# Patient Record
Sex: FEMALE | Race: WHITE | Employment: OTHER | URBAN - METROPOLITAN AREA
[De-identification: names, ages, dates, MRNs, and addresses within clinical notes are randomized per-mention and may not be internally consistent; named-entity substitution may affect disease eponyms.]

---

## 2017-05-30 ENCOUNTER — OP HISTORICAL/CONVERTED ENCOUNTER (OUTPATIENT)
Dept: OTHER | Age: 72
End: 2017-05-30

## 2017-10-04 ENCOUNTER — OP HISTORICAL/CONVERTED ENCOUNTER (OUTPATIENT)
Dept: OTHER | Age: 72
End: 2017-10-04

## 2019-03-14 ENCOUNTER — OP HISTORICAL/CONVERTED ENCOUNTER (OUTPATIENT)
Dept: OTHER | Age: 74
End: 2019-03-14

## 2019-04-11 ENCOUNTER — OP HISTORICAL/CONVERTED ENCOUNTER (OUTPATIENT)
Dept: OTHER | Age: 74
End: 2019-04-11

## 2020-05-29 ENCOUNTER — OP HISTORICAL/CONVERTED ENCOUNTER (OUTPATIENT)
Dept: OTHER | Age: 75
End: 2020-05-29

## 2020-06-08 ENCOUNTER — OFFICE VISIT (OUTPATIENT)
Dept: FAMILY MEDICINE CLINIC | Age: 75
End: 2020-06-08

## 2020-06-08 ENCOUNTER — HOSPITAL ENCOUNTER (OUTPATIENT)
Dept: LAB | Age: 75
Discharge: HOME | End: 2020-06-08

## 2020-06-08 VITALS
TEMPERATURE: 98 F | DIASTOLIC BLOOD PRESSURE: 61 MMHG | RESPIRATION RATE: 18 BRPM | HEART RATE: 69 BPM | WEIGHT: 150.8 LBS | BODY MASS INDEX: 25.12 KG/M2 | HEIGHT: 65 IN | SYSTOLIC BLOOD PRESSURE: 137 MMHG | OXYGEN SATURATION: 98 %

## 2020-06-08 DIAGNOSIS — I10 ESSENTIAL HYPERTENSION: ICD-10-CM

## 2020-06-08 DIAGNOSIS — N18.2 TYPE 2 DIABETES MELLITUS WITH STAGE 2 CHRONIC KIDNEY DISEASE, WITH LONG-TERM CURRENT USE OF INSULIN (HCC): Chronic | ICD-10-CM

## 2020-06-08 DIAGNOSIS — G45.9 TIA (TRANSIENT ISCHEMIC ATTACK): ICD-10-CM

## 2020-06-08 DIAGNOSIS — E11.22 TYPE 2 DIABETES MELLITUS WITH STAGE 2 CHRONIC KIDNEY DISEASE, WITH LONG-TERM CURRENT USE OF INSULIN (HCC): Chronic | ICD-10-CM

## 2020-06-08 DIAGNOSIS — M79.2 NEUROPATHIC PAIN: ICD-10-CM

## 2020-06-08 DIAGNOSIS — N83.8 OVARIAN MASS: ICD-10-CM

## 2020-06-08 DIAGNOSIS — Z79.4 TYPE 2 DIABETES MELLITUS WITH STAGE 2 CHRONIC KIDNEY DISEASE, WITH LONG-TERM CURRENT USE OF INSULIN (HCC): Chronic | ICD-10-CM

## 2020-06-08 DIAGNOSIS — G89.29 OTHER CHRONIC PAIN: ICD-10-CM

## 2020-06-08 DIAGNOSIS — I10 ESSENTIAL HYPERTENSION: Primary | ICD-10-CM

## 2020-06-08 LAB
ALBUMIN SERPL-MCNC: 3.8 G/DL (ref 3.5–5)
ALBUMIN/GLOB SERPL: 1 {RATIO} (ref 1.1–2.2)
ALP SERPL-CCNC: 101 U/L (ref 45–117)
ALT SERPL-CCNC: 22 U/L (ref 12–78)
AMPHET UR QL SCN: NEGATIVE
ANION GAP SERPL CALC-SCNC: 8 MMOL/L (ref 5–15)
AST SERPL-CCNC: 20 U/L (ref 15–37)
BARBITURATES UR QL SCN: NEGATIVE
BENZODIAZ UR QL: NEGATIVE
BILIRUB SERPL-MCNC: 0.5 MG/DL (ref 0.2–1)
BUN SERPL-MCNC: 28 MG/DL (ref 6–20)
BUN/CREAT SERPL: 23 (ref 12–20)
CALCIUM SERPL-MCNC: 9.7 MG/DL (ref 8.5–10.1)
CANNABINOIDS UR QL SCN: NEGATIVE
CHLORIDE SERPL-SCNC: 108 MMOL/L (ref 97–108)
CHOLEST SERPL-MCNC: 197 MG/DL
CO2 SERPL-SCNC: 24 MMOL/L (ref 21–32)
COCAINE UR QL SCN: NEGATIVE
CREAT SERPL-MCNC: 1.21 MG/DL (ref 0.55–1.02)
CREAT UR-MCNC: 157 MG/DL
DRUG SCRN COMMENT,DRGCM: NORMAL
ERYTHROCYTE [DISTWIDTH] IN BLOOD BY AUTOMATED COUNT: 16.2 % (ref 11.5–14.5)
EST. AVERAGE GLUCOSE BLD GHB EST-MCNC: 177 MG/DL
GLOBULIN SER CALC-MCNC: 3.8 G/DL (ref 2–4)
GLUCOSE SERPL-MCNC: 52 MG/DL (ref 65–100)
HBA1C MFR BLD: 7.8 % (ref 4–5.6)
HCT VFR BLD AUTO: 34.1 % (ref 35–47)
HDLC SERPL-MCNC: 53 MG/DL
HDLC SERPL: 3.7 {RATIO} (ref 0–5)
HGB BLD-MCNC: 10.1 G/DL (ref 11.5–16)
LDLC SERPL CALC-MCNC: 108 MG/DL (ref 0–100)
LIPID PROFILE,FLP: ABNORMAL
MCH RBC QN AUTO: 24.2 PG (ref 26–34)
MCHC RBC AUTO-ENTMCNC: 29.6 G/DL (ref 30–36.5)
MCV RBC AUTO: 81.8 FL (ref 80–99)
METHADONE UR QL: NEGATIVE
MICROALBUMIN UR-MCNC: 4.25 MG/DL
MICROALBUMIN/CREAT UR-RTO: 27 MG/G (ref 0–30)
NRBC # BLD: 0 K/UL (ref 0–0.01)
NRBC BLD-RTO: 0 PER 100 WBC
OPIATES UR QL: NEGATIVE
PCP UR QL: NEGATIVE
PLATELET # BLD AUTO: 379 K/UL (ref 150–400)
PMV BLD AUTO: 10.2 FL (ref 8.9–12.9)
POTASSIUM SERPL-SCNC: 5.1 MMOL/L (ref 3.5–5.1)
PROT SERPL-MCNC: 7.6 G/DL (ref 6.4–8.2)
RBC # BLD AUTO: 4.17 M/UL (ref 3.8–5.2)
SODIUM SERPL-SCNC: 140 MMOL/L (ref 136–145)
TRIGL SERPL-MCNC: 180 MG/DL (ref ?–150)
VLDLC SERPL CALC-MCNC: 36 MG/DL
WBC # BLD AUTO: 9.3 K/UL (ref 3.6–11)

## 2020-06-08 RX ORDER — INSULIN ASPART 100 [IU]/ML
INJECTION, SOLUTION INTRAVENOUS; SUBCUTANEOUS AS NEEDED
COMMUNITY

## 2020-06-08 RX ORDER — DICLOFENAC SODIUM 75 MG/1
75 TABLET, DELAYED RELEASE ORAL DAILY
COMMUNITY

## 2020-06-08 RX ORDER — GABAPENTIN 300 MG/1
300 CAPSULE ORAL
Qty: 90 CAP | Refills: 0 | Status: SHIPPED | OUTPATIENT
Start: 2020-06-08 | End: 2020-07-24 | Stop reason: SDUPTHER

## 2020-06-08 RX ORDER — SIMVASTATIN 20 MG/1
20 TABLET, FILM COATED ORAL AT BEDTIME
COMMUNITY
Start: 2017-03-29 | End: 2020-06-24 | Stop reason: SDUPTHER

## 2020-06-08 RX ORDER — GABAPENTIN 300 MG/1
300 CAPSULE ORAL 3 TIMES DAILY
COMMUNITY
Start: 2017-11-17 | End: 2020-06-08 | Stop reason: SDUPTHER

## 2020-06-08 RX ORDER — TRAMADOL HYDROCHLORIDE 50 MG/1
50 TABLET ORAL
COMMUNITY
Start: 2020-05-07 | End: 2020-07-24 | Stop reason: SDUPTHER

## 2020-06-08 RX ORDER — AMLODIPINE BESYLATE 5 MG/1
5 TABLET ORAL DAILY
COMMUNITY
Start: 2020-05-23 | End: 2020-08-20 | Stop reason: SDUPTHER

## 2020-06-08 RX ORDER — RAMIPRIL 10 MG/1
10 CAPSULE ORAL 2 TIMES DAILY
COMMUNITY
Start: 2017-03-29 | End: 2020-06-24 | Stop reason: SDUPTHER

## 2020-06-08 RX ORDER — CLOPIDOGREL BISULFATE 75 MG/1
75 TABLET ORAL DAILY
COMMUNITY
Start: 2017-03-29 | End: 2020-06-24 | Stop reason: SDUPTHER

## 2020-06-08 RX ORDER — LORATADINE 10 MG/1
10 TABLET ORAL 2 TIMES DAILY
COMMUNITY
End: 2021-10-13

## 2020-06-08 RX ORDER — FAMOTIDINE 40 MG/1
40 TABLET, FILM COATED ORAL DAILY
COMMUNITY
Start: 2020-05-26

## 2020-06-08 RX ORDER — METFORMIN HYDROCHLORIDE 1000 MG/1
1000 TABLET ORAL 2 TIMES DAILY
COMMUNITY
Start: 2017-03-29 | End: 2020-06-24 | Stop reason: SDUPTHER

## 2020-06-08 NOTE — PATIENT INSTRUCTIONS
Caleb Belle with [facility]  [address] [place] [address], [facility] [address]  [phone]    Log blood pressures at home while sitting, relaxed 3-5 times weekly and bring to next visit. Goal BP of 130/80 on average or lower. Call office as soon as possible if BP's over 140/90 or below 110/50 on multiple occasions and/or with symptoms of dizziness, chest pain, shortness of breath, headache or ankle swelling. Recheck Log and BP in office in [unfilled]    Blood Pressure Record     Patient Name:  ______________________ :  ______________________    Date/Time BP Reading Pulse                                                                                                                                                                                          [facility]   [address]  [phone]    Blood Sugar Record  Patient Name:__________________________                     :__________________    Monitor blood sugar at home then bring the record to office  in 3 weeks for review.       Date Breakfast Lunch Dinner Bedtime notes

## 2020-06-08 NOTE — PROGRESS NOTES
[facility]    History of Present Illness:   Clau Elizabeth is a 75 y.o. female with history of Diabetes Mellitus, Hypertension, Chronic Pain, TIA  CC: Establish Care  History provided by patient and Records    HPI:  Ovarian Mass: Patient has a large mass (8in by 5in) that is being removed on Friday. Hypertension Follow up:  Currently Taking Ramipril 10 mg, Amlodipine 5 mg. On reviewing previous records BP consistently elevated in the -190, and diagnosed with white coat hypertension. The patient reports:  taking medications as instructed, no medication side effects noted, patient does not perform home BP monitoring, no TIA's, no chest pain on exertion, no dyspnea on exertion, no swelling of ankles. BP Readings from Last 3 Encounters:   06/08/20 137/61      Diabetes Follow up: Overall the patient feels well with good energy level. Per records from previous PCP last A1C was 7.7. Patient has had decreased Novolog use, but is on 20 units BID of Levemir   Current Medications:   Key Antihyperglycemic Medications             metFORMIN (GLUCOPHAGE) 1,000 mg tablet (Taking) Take 1,000 mg by mouth two (2) times a day. insulin detemir U-100 (LEVEMIR) 100 unit/mL injection (Taking) 25 Units by SubCUTAneous route two (2) times a day. insulin aspart U-100 (NovoLOG U-100 Insulin aspart) 100 unit/mL injection (Taking) by SubCUTAneous route as needed. .   Insulin dependence: YES, Novolog sliding scale, Levemir 20 units BID. Metformin 1000 mg BID. Frequency of home glucose testing: Daily   Blood Sugar range at home: 150-190    Last eye exam: In past 12 months. Last foot exam: This year.    Polyuria, polyphagia or polydipsia: NO   Retinopathy: NO   Neuropathy SX: YES- Gabapentin 300 mg taken nightly   Low blood sugar symptoms: NO   Dietary compliance: lots of fruit, and high carb vegetables   Medication compliance: compliant most of the time   On ASA: NO   Tobacco Use: NO   Depression: NO     Wt Readings from Last 3 Encounters:   06/08/20 150 lb 12.8 oz (68.4 kg)     Hypertriglyceridemia Follow up:   Cardiovascular risks for her are: diabetic, hypertension, hyperlipidemia, CVA. Currently she takes Zocor (simvastatin) , 20 mg. Myalgias: NO   Fatigue: NO   Other side effects: NO     Wt Readings from Last 3 Encounters:   06/08/20 150 lb 12.8 oz (68.4 kg)     History of CVA/TIA: Patient with history of TIA 5-6 years ago, since returned to normal function. Currently taking Plavix. Arthritis: Osteoarthritis primarily in the hands and low back that is controlled with Tramadol. Patient Has seen Rheumatologist at [facility] that had negative studies. Health Maintenance Due   Topic Date Due    Foot Exam Q1  08/19/1955    A1C test (Diabetic or Prediabetic)  [birth year]19/1955    MICROALBUMIN Q1  08/19/1955    Eye Exam Retinal or Dilated  08/19/1955    Lipid Screen  08/19/1955    DTaP/Tdap/Td series (1 - Tdap) 08/19/1965    Shingrix Vaccine Age 50> (1 of 2) 08/19/1994    GLAUCOMA SCREENING Q2Y  08/19/2009    Bone Densitometry (Dexa) Screening  08/19/2009    Pneumococcal 65+ years (1 of 1 - PPSV23) 08/19/2009    Medicare Yearly Exam  05/29/2020     GERD: Started on Famotidine, Kidney doctor stopped due to concern for effects on possible kidneys. Noting Famotidine is not as effective as Prilosec. Diet/Exercise History:  Diet: Favorite food Fruits. - Does patient eat at least 5 servings of Fruits/vegetables daily? Yes   - Is patient currently dieting? No    Exercise: Patient does not have structured exercise  - Does patient get 150 minutes of structured exercise weekly? No    Current Medications:     Current Outpatient Medications   Medication Sig    traMADoL (ULTRAM) 50 mg tablet Take 50 mg by mouth every four to six (4-6) hours as needed.  ramipriL (ALTACE) 10 mg capsule Take 10 mg by mouth two (2) times a day.     metFORMIN (GLUCOPHAGE) 1,000 mg tablet Take 1,000 mg by mouth two (2) times a day.  amLODIPine (NORVASC) 5 mg tablet Take 5 mg by mouth daily.  simvastatin (ZOCOR) 20 mg tablet Take 20 mg by mouth At bedtime.  clopidogreL (PLAVIX) 75 mg tab Take 75 mg by mouth daily.  famotidine (PEPCID) 40 mg tablet Take 40 mg by mouth daily.  diclofenac EC (VOLTAREN) 75 mg EC tablet Take 75 mg by mouth daily.  loratadine (Claritin) 10 mg tablet Take 10 mg by mouth two (2) times a day.  insulin detemir U-100 (LEVEMIR) 100 unit/mL injection 25 Units by SubCUTAneous route two (2) times a day.  insulin aspart U-100 (NovoLOG U-100 Insulin aspart) 100 unit/mL injection by SubCUTAneous route as needed.  gabapentin (NEURONTIN) 300 mg capsule Take 1 Cap by mouth nightly. Max Daily Amount: 300 mg. No current facility-administered medications for this visit. Past Medical, Family, and Social History:     Past Medical History:   Diagnosis Date    Chronic pain     Diabetes ([facility] [address]     Hypertension      Past Surgical History:   Procedure Laterality Date    HX CHOLECYSTECTOMY      HX GYN      HX ORTHOPAEDIC      HX UROLOGICAL       No family history on file.   Social History     Socioeconomic History    Marital status:      Spouse name: Not on file    Number of children: Not on file    Years of education: Not on file    Highest education level: Not on file   Occupational History    Not on file   Social Needs    Financial resource strain: Not on file    Food insecurity     Worry: Not on file     Inability: Not on file    Transportation needs     Medical: Not on file     Non-medical: Not on file   Tobacco Use    Smoking status: Never Smoker    Smokeless tobacco: Never Used   Substance and Sexual Activity    Alcohol use: Never     Frequency: Never    Drug use: Never    Sexual activity: Not on file   Lifestyle    Physical activity     Days per week: Not on file     Minutes per session: Not on file    Stress: Not on file Relationships    Social connections     Talks on phone: Not on file     Gets together: Not on file     Attends Restorationism service: Not on file     Active member of club or organization: Not on file     Attends meetings of clubs or organizations: Not on file     Relationship status: Not on file    Intimate partner violence     Fear of current or ex partner: Not on file     Emotionally abused: Not on file     Physically abused: Not on file     Forced sexual activity: Not on file   Other Topics Concern    Not on file   Social History Narrative    [employer] school and course of Psychology     Allergies   Allergen Reactions    Codeine Other (comments)    Sulfa (Sulfonamide Antibiotics) Other (comments)       There is no immunization history on file for this patient. Review of Systems   Review of Systems   Constitutional: Negative for chills and fever. HENT: Negative for congestion. Eyes: Negative for blurred vision. Respiratory: Negative for cough. Cardiovascular: Negative for chest pain and palpitations. Gastrointestinal: Negative for abdominal pain, nausea and vomiting. Musculoskeletal: Positive for back pain and joint pain. Skin: Negative for rash. Objective:     Visit Vitals  /61 (BP 1 Location: Right arm, BP Patient Position: Sitting)   Pulse 69   Temp 98 °F (36.7 °C) (Oral)   Resp 18   Ht 5' 5\" (1.651 m)   Wt 150 lb 12.8 oz (68.4 kg)   SpO2 98%   BMI 25.09 kg/m²        Physical Exam  Vitals signs and nursing note reviewed. Constitutional:       Appearance: Normal appearance. HENT:      Head: Normocephalic and atraumatic. Eyes:      Extraocular Movements: Extraocular movements intact. Conjunctiva/sclera: Conjunctivae normal.   Neck:      Musculoskeletal: Normal range of motion and neck supple. Cardiovascular:      Rate and Rhythm: Normal rate and regular rhythm. Pulses: Normal pulses. Heart sounds: Normal heart sounds. No murmur. No friction rub.  No gallop. Pulmonary:      Effort: Pulmonary effort is normal.      Breath sounds: Normal breath sounds. Abdominal:      General: Abdomen is flat. Bowel sounds are normal.      Palpations: Abdomen is soft. Musculoskeletal: Normal range of motion. General: No swelling. Skin:     General: Skin is warm and dry. Neurological:      General: No focal deficit present. Mental Status: She is alert. Cranial Nerves: Cranial nerve deficit present. Psychiatric:         Mood and Affect: Mood normal.         Behavior: Behavior normal.         Thought Content: Thought content normal.         Judgment: Judgment normal.         Pertinent Labs/Studies:      Assessment and orders:       ICD-10-CM ICD-9-CM    1. Essential hypertension I10 401.9 CBC W/O DIFF      METABOLIC PANEL, COMPREHENSIVE   2. Other chronic pain G89.29 338.29 DRUG SCREEN, URINE      COMPLIANCE DRUG SCREEN/PRESCRIPTION MONITORING   3. Type 2 diabetes mellitus with stage 2 chronic kidney disease, with long-term current use of insulin ([facility]) E11.22 250.40 LIPID PANEL    N18.2 585.2 HEMOGLOBIN A1C WITH EAG    Z79.4 V58.67 MICROALBUMIN, UR, RAND W/ MICROALB/CREAT RATIO   4. Ovarian mass N83.8 620.8    5. TIA (transient ischemic attack) G45.9 435.9    6. Neuropathic pain M79.2 729.2 gabapentin (NEURONTIN) 300 mg capsule     Diagnoses and all orders for this visit:    1. Essential hypertension: BP improved on repeat. White coat Hypertension. No changes to current regimen.    -     CBC W/O DIFF; Future  -     METABOLIC PANEL, COMPREHENSIVE; Future    2. Other chronic pain: Setting up Compliance contract, reviewed records, continuing current medications.  -     DRUG SCREEN, URINE; Future  -     COMPLIANCE DRUG SCREEN/PRESCRIPTION MONITORING; Future    3.  Type 2 diabetes mellitus with stage 2 chronic kidney disease, with long-term current use of insulin [facility]): Discussed with patient today that the goal for their diabetes is to have a HgA1C<7 and ideally as close to 6.5 as possible. We discussed diet and medications. The goal for the cholesterol LDL is less than 70 and HDL>40. Patient is aware of the need for yearly eye exams and to take care of their feet daily. Discussed with patient that blood pressure should be less than 130/80 and watching salt intake is very important.    -     LIPID PANEL; Future  -     HEMOGLOBIN A1C WITH EAG; Future  -     MICROALBUMIN, UR, RAND W/ MICROALB/CREAT RATIO; Future    4. Ovarian mass: Follow up with Oncologist and surgery scheduled for Friday. Requesting records. 5. TIA (transient ischemic attack): Old, no deficits noted. 6. Neuropathic pain: Controlled substance agreement signed. Able to review records. Also advising trial of gabapentin in the morning to help with Shingles related itching.    -     gabapentin (NEURONTIN) 300 mg capsule; Take 1 Cap by mouth nightly. Max Daily Amount: 300 mg. Follow-up and Dispositions    · Return in about 3 months (around 9/8/2020). I have discussed the diagnosis with the patient and the intended plan as seen in the above orders. Social history, medical history, and labs were reviewed. The patient has received an after-visit summary and questions were answered concerning future plans. I have discussed medication side effects and warnings with the patient as well.     MD HOUSTON Gilliam & ALONDRA WOODS [facility]  06/08/20

## 2020-06-08 NOTE — LETTER
Name:Simone Chawla Solders PPA:[birth year] MR #:<[id number]> 2102 [facility] Page 1 of 5 CONTROLLED SUBSTANCE AGREEMENT I may be prescribed medications that are controlled substances as part  of my treatment plan for management of my medical condition(s). The goal of my treatment plan is to maintain and/or improve my health and wellbeing. Because controlled substances have an increased risk of abuse or harm, continual re-evaluation is needed determine if the goals of my treatment plan are being met for my safety and the safety of others. I, Rosalino Barth  am entering into this Controlled Substance Agreement with my provider, __________________________________ at [facility] . I understand that successful treatment requires mutual trust and honesty between me and my provider. I understand that there are state and federal laws and regulations which apply to the medications that my provider may prescribe that must be followed. I understand there are risks and benefits ts of taking the medicines that my provider may prescribe. I understand and agree that following this Agreement is necessary in continuing my provider-patient relationship and success of my treatment plan. As a part of my treatment plan, I agree to the following: COMMUNICATION: 
 
1. I will communicate fully with my provider about my medical condition(s), including the effect on my daily life and how well my medications are helping. I will tell my provider all of the medications that I take for any reason, including medications I receive from another health care provider, and will notify my provider about all issues, problems or concerns, including any side effects, which may be related to my medications. I understand that this information allows my provider to adjust my treatment plan to help manage my medical condition.  I understand that this information will become part of my permanent medical record. 2. I will notify my provider if I have a history of alcohol/drug misuse/addiction or if I have had treatment for alcohol/drug addiction in the past, or if I have a new problem with or concern about alcohol/drug use/addiction, because this increases the likelihood of high risk behaviors and may lead to serious medical conditions. 3. Females Only: I will notify my provider if I am or become pregnant, or if I intend to become pregnant, or if I intend to breastfeed. I understand that communication of these issues with my provider is important, due to possible effects my medication could have on an unborn fetus or breastfeeding child. Initials_____ Name:Simone Smith AKL:[id number] MR #:<[id number]> 2102 [facility] Page 2 of 5 MISUSE OF MEDICATIONS / DRUGS: 
 
1. I agree to take all controlled substances as prescribed, and will not misuse or abuse any controlled substances prescribed by my provider. For my safety, I will not increase the amount of medicine I take without first talking with and getting permission from my provider. 2. If I have a medical emergency, another health care provider may prescribe me medication. If I seek emergency treatment, I will notify my provider within seventy-two (72) hours. 3. I understand that my provider may discuss my use and/or possible misuse/abuse of controlled substances and alcohol, as appropriate, with any health care provider involved in my care, pharmacist or legal authority. ILLEGAL DRUGS: 
 
1. I will not use illegal drugs of any kind, including but not limited to marijuana, heroin, cocaine, or any prescription drug which is not prescribed to me. DRUG DIVERSION / PRESCRIPTION FRAUD: 
 
1. I will not share, sell, trade, give away, or otherwise misuse my prescriptions or medications. 2. I will not alter any prescriptions provided to me by my provider. SINGLE PROVIDER: 
 
1. I agree that all controlled substances that I take will be prescribed only by my provider (or his/her covering provider) under this Agreement. This agreement does not prevent me from seeking emergency medical treatment or receiving pain management related to a surgery. PROTECTING MEDICATIONS: 
 
1. I am responsible for keeping my prescriptions and medications in a safe and secure place including safeguarding them from loss or theft. I understand that lost, stolen or damaged/destroyed prescriptions or medications will not be replaced. Initials____ Name:.Shagufta Yepez RHW:[birth year] MR #:<[id number]> 2102 [facility] Page 3 of 5 PRESCRIPTION RENEWALS/REFILLS: 
 
1. I will follow my controlled substance medication schedule as prescribed by my provider. 2. I understand and agree that I will make any requests for renewals or refills of my prescriptions only at the time of an office visit or during my providers regular office hours subject to the prescription refill requirements of the individual practice. 3. I understand that my provider may not call in prescriptions for controlled substances to my pharmacy. 4. I understand that my provider may adjust or discontinue these medications as deemed appropriate for my medical treatment plan. This Agreement does not guarantee the prescription of controlled medications. 5. I agree that if my medications are adjusted or discontinued, I will properly dispose of any remaining medications. I understand that I will be required to dispose of any remaining controlled medications prior to being provided with any prescriptions for other controlled medications.  
 
6. I understand that the renewal of my prescription depends on my medical condition, my consistent participation, and my adherence with my treatment plan and this Agreement. 7. I understand that if I do not keep an appointment with my provider, I may not receive a renewal or refill for my controlled substance medication. PRESCRIPTION MONITORING / DRUG TESTIN. I understand that my provider may require me to provide urine, saliva or blood for testing at any time. I understand that this testing will be used to monitor for safety and adherence with my treatment plan and this Agreement. 2. I understand that my provider may ask me to provide an observed urine specimen, which means that a nurse or other health care provider may watch me provide urine, and I agree to cooperate if I am asked to provide an observed specimen. 3. I understand that if I do not provide urine, saliva or blood samples within two (2) hours of my providers request, or other timeframe decided by my provider, my treatment plan could be changed, or my prescriptions and medications may be changed or ended. 4. I understand that urine, saliva and blood test results will be a part of my permanent medical record. Initials_____ Name:Simone Qiu Susan [facility]: MR #:<[id number]> 2102 [facility] Page 4 of 5 
 
 
1. I authorize my provider and my pharmacy to cooperate fully with any local, state, or federal law enforcement agency in the investigation of any possible misuse, sale, or other diversion of my controlled substance prescriptions or medications. RISKS: 
 
1. I understand that my level of consciousness may be affected from the use of controlled substances, and I understand that there are risks, benefits, effects and potential alternatives (including no treatment) to the medications that my provider has prescribed. 2. I understand that I may become drowsy, tired, dizzy, constipated, and sick to my stomach, or have changes in my mood or in my sleep while taking my medications. I have talked with my provider about these possible side effects, risks, benefits, and alternative treatments, and my provider has answered all of my questions. 3. I understand that I should not suddenly stop taking my medications without first speaking with my provider. I understand that if I suddenly stop taking my medications, I may experience nausea, vomiting, sweating,anxiety, sleeplessness, itching or other uncomfortable feelings. 4. I will not take my medications with alcohol of any kind, including beer, wine or liquor. I understand that drinking alcohol with my medications increases the chances of side effects, including breathing problems or even death. 5. I understand that if I have a history of alcoholism or other drug addiction I may be at increased risk of addiction to my medications. Signs of addiction might include craving, compulsive use, and continued use despite harm. Since addiction is a disease, I understand my provider may decide to change my medications and refer me to appropriate treatment services. I understand that this information would become part of my permanent medical record. Initials_____ Name:Simone Yan [facility]:7/68/2242 MR #:<[id number]>  [facility] Page 5 of 5  
 
 
6. Females only: Children born to women who regularly take controlled substances are likely to have physical problems and suffer withdrawal symptoms at birth. If I am of child-bearing age, I understand that I should use safe and effective birth control while taking any controlled substances to avoid the impact of medications on an unborn fetus or  child. I agree to notify my provider immediately if I should become pregnant so that my treatment plan can be adjusted. 7. Males only: I understand that chronic use of controlled substances has been associated with low testosterone levels in males which may affect my mood, stamina, sexual desire, and general health. I understand that my provider may order the appropriate laboratory test to determine my testosterone level,and I agree to this testing. ADHERENCE: 
 
1. I understand that if I do not adhere to this Agreement in any way, my provider may change my prescriptions, stop prescribing controlled substances or end our provider-patient relationship. 2. If my provider decides to stop prescribing medication, or decides to end our provider-patient relationship,my provider may require that I taper my medications slowly. If necessary, my provider may also provide a prescription for other medications to treat my withdrawal symptoms. UNDERSTANDING THIS AGREEMENT: 
 
I understand that my provider may adjust or stop my prescriptions for controlled substances based on my medical condition and my treatment plan. I understand that this Agreement does not guarantee that I will be prescribed medications or controlled substances. I understand that controlled substances may be just one part 
of my treatment plan.  
 
My initial on each page and my signature below shows that I have read each page of this Agreement, I have had an opportunity to ask questions, and all of my questions have been answered to my satisfaction by my provider. By signing below, I agree to comply with this Agreement, and I understand that if I do not follow the Agreements listed above, my provider may stop 
 
_________________________________________  Date/Time 6/8/2020 1:46 PM   
             (Patient Signature) 
 
________________________________________    Date/Time 6/8/2020 1:46 PM 
 (Parent or Guardian Signature if <18 yrs) 
 
_________________________________________ Date/Time 6/8/2020 1:46 PM 
 (Provider Signature)

## 2020-06-08 NOTE — PROGRESS NOTES
Chief Complaint   Patient presents with   [facility] Establish Care     Visit Vitals  /68 (BP 1 Location: Right arm, BP Patient Position: Sitting)   Pulse 69   Temp 98 °F (36.7 °C) (Oral)   Resp 18   Ht 5' 5\" (1.651 m)   Wt 150 lb 12.8 oz (68.4 kg)   SpO2 98%   BMI 25.09 kg/m²     1. Have you been to the ER, urgent care clinic since your last visit? Hospitalized since your last visit? No    2. Have you seen or consulted any other health care providers outside of the [address] [place] [address] since your last visit? Include any pap smears or colon screening.  No    Reviewed record in preparation for visit and have necessary documentation  Pt did not bring medication to office visit for review  opportunity was given for questions  Goals that were addressed and/or need to be completed during or after this appointment include   Health Maintenance Due   Topic Date Due    Lipid Screen  08/19/1955    DTaP/Tdap/Td series (1 - Tdap) 08/19/1965    Shingrix Vaccine Age 50> (1 of 2) 08/19/1994    GLAUCOMA SCREENING Q2Y  08/19/2009    Bone Densitometry (Dexa) Screening  08/19/2009    Pneumococcal 65+ years (1 of 1 - PPSV23) 08/19/2009    Medicare Yearly Exam  05/29/2020

## 2020-06-09 ENCOUNTER — TELEPHONE (OUTPATIENT)
Dept: FAMILY MEDICINE CLINIC | Age: 75
End: 2020-06-09

## 2020-06-09 NOTE — PROGRESS NOTES
Normal Microalbumin. UDS negative. Stage 3 CKD, not significantly changed from previous labs. Hypoglycemia present. Mildly elevated Triglycerides and LDL. A1C elevated at 7.8, though for age, not significantly above goal, last A1C was 7. 2. Mild anemia present, otherwise unremarkable CBC.

## 2020-06-09 NOTE — TELEPHONE ENCOUNTER
Called, discussed labs. Increasing Levemir in the morning to 25 uniys from 20 units, keeping evening dose at 20 units. Faxed result letter to [facility] for Pre-op.     MD HOUSTON Lugo & ALONDRA WOODS [facility]  06/09/20

## 2020-06-13 LAB — DRUGS UR: NORMAL

## 2020-06-24 RX ORDER — PANTOPRAZOLE SODIUM 40 MG/1
40 TABLET, DELAYED RELEASE ORAL DAILY
Qty: 90 TAB | Refills: 0 | Status: SHIPPED | OUTPATIENT
Start: 2020-06-24 | End: 2020-10-06 | Stop reason: SDUPTHER

## 2020-06-24 RX ORDER — PANTOPRAZOLE SODIUM 40 MG/1
TABLET, DELAYED RELEASE ORAL
COMMUNITY
Start: 2017-03-29 | End: 2020-06-24 | Stop reason: SDUPTHER

## 2020-06-24 RX ORDER — SIMVASTATIN 20 MG/1
20 TABLET, FILM COATED ORAL
Qty: 90 TAB | Refills: 0 | Status: SHIPPED | OUTPATIENT
Start: 2020-06-24 | End: 2020-10-06 | Stop reason: SDUPTHER

## 2020-06-24 RX ORDER — METFORMIN HYDROCHLORIDE 1000 MG/1
1000 TABLET ORAL 2 TIMES DAILY
Qty: 180 TAB | Refills: 0 | Status: SHIPPED | OUTPATIENT
Start: 2020-06-24 | End: 2020-10-06 | Stop reason: SDUPTHER

## 2020-06-24 RX ORDER — RAMIPRIL 10 MG/1
10 CAPSULE ORAL 2 TIMES DAILY
Qty: 180 CAP | Refills: 0 | Status: SHIPPED | OUTPATIENT
Start: 2020-06-24 | End: 2020-10-06 | Stop reason: SDUPTHER

## 2020-06-24 RX ORDER — CLOPIDOGREL BISULFATE 75 MG/1
75 TABLET ORAL DAILY
Qty: 90 TAB | Refills: 0 | Status: SHIPPED | OUTPATIENT
Start: 2020-06-24 | End: 2020-10-06 | Stop reason: SDUPTHER

## 2020-06-24 NOTE — TELEPHONE ENCOUNTER
I am not able to select any of the scripts for refills. I need the following scripts refilled with a 3 month supply please:  Metformin 1000 mg - 2/day  Simvastatin 20 mg  Ramiprel 10 mg - 2/day  Clopidagrel 75 mg  Pantoprazole 40 mg     Mychart message from patient.

## 2020-07-22 ENCOUNTER — TELEPHONE (OUTPATIENT)
Dept: FAMILY MEDICINE CLINIC | Age: 75
End: 2020-07-22

## 2020-07-22 NOTE — TELEPHONE ENCOUNTER
Magdalene/daughter has an appointment with Dr. Severo Glover, July 24, 2020 08:00 AM. Want to know is it possible to see Pt on the same day for the back of her calf. It is very painful for her to even walk. Going on three weeks. She has been trying to just bear it. You have a 9:30 a.m.?

## 2020-07-24 ENCOUNTER — HOSPITAL ENCOUNTER (OUTPATIENT)
Dept: LAB | Age: 75
Discharge: HOME | End: 2020-07-24

## 2020-07-24 ENCOUNTER — OFFICE VISIT (OUTPATIENT)
Dept: FAMILY MEDICINE CLINIC | Age: 75
End: 2020-07-24

## 2020-07-24 VITALS
DIASTOLIC BLOOD PRESSURE: 73 MMHG | HEIGHT: 65 IN | WEIGHT: 155.4 LBS | BODY MASS INDEX: 25.89 KG/M2 | OXYGEN SATURATION: 97 % | RESPIRATION RATE: 18 BRPM | SYSTOLIC BLOOD PRESSURE: 171 MMHG | TEMPERATURE: 97.3 F | HEART RATE: 71 BPM

## 2020-07-24 DIAGNOSIS — I73.9 PERIPHERAL VASCULAR DISEASE (HCC): ICD-10-CM

## 2020-07-24 DIAGNOSIS — I10 ESSENTIAL HYPERTENSION: ICD-10-CM

## 2020-07-24 DIAGNOSIS — M79.2 NEUROPATHIC PAIN: ICD-10-CM

## 2020-07-24 DIAGNOSIS — E11.40 TYPE 2 DIABETES MELLITUS WITH DIABETIC NEUROPATHY, WITH LONG-TERM CURRENT USE OF INSULIN (HCC): ICD-10-CM

## 2020-07-24 DIAGNOSIS — N83.8 OVARIAN MASS: Chronic | ICD-10-CM

## 2020-07-24 DIAGNOSIS — R25.2 LEG CRAMPING: Primary | ICD-10-CM

## 2020-07-24 DIAGNOSIS — R25.2 LEG CRAMPING: ICD-10-CM

## 2020-07-24 DIAGNOSIS — Z79.4 TYPE 2 DIABETES MELLITUS WITH DIABETIC NEUROPATHY, WITH LONG-TERM CURRENT USE OF INSULIN (HCC): ICD-10-CM

## 2020-07-24 LAB
25(OH)D3 SERPL-MCNC: 9.5 NG/ML (ref 30–100)
ALBUMIN SERPL-MCNC: 3.6 G/DL (ref 3.5–5)
ALBUMIN/GLOB SERPL: 0.9 {RATIO} (ref 1.1–2.2)
ALP SERPL-CCNC: 120 U/L (ref 45–117)
ALT SERPL-CCNC: 26 U/L (ref 12–78)
ANION GAP SERPL CALC-SCNC: 6 MMOL/L (ref 5–15)
AST SERPL-CCNC: 17 U/L (ref 15–37)
BILIRUB SERPL-MCNC: 0.2 MG/DL (ref 0.2–1)
BUN SERPL-MCNC: 18 MG/DL (ref 6–20)
BUN/CREAT SERPL: 16 (ref 12–20)
CALCIUM SERPL-MCNC: 9.2 MG/DL (ref 8.5–10.1)
CHLORIDE SERPL-SCNC: 106 MMOL/L (ref 97–108)
CO2 SERPL-SCNC: 26 MMOL/L (ref 21–32)
CREAT SERPL-MCNC: 1.1 MG/DL (ref 0.55–1.02)
D DIMER PPP FEU-MCNC: 3.27 MG/L FEU (ref 0–0.65)
FOLATE SERPL-MCNC: 6.9 NG/ML (ref 5–21)
GLOBULIN SER CALC-MCNC: 3.8 G/DL (ref 2–4)
GLUCOSE SERPL-MCNC: 165 MG/DL (ref 65–100)
HCT VFR BLD AUTO: 34 % (ref 35–47)
HGB BLD-MCNC: 9.9 G/DL (ref 11.5–16)
POTASSIUM SERPL-SCNC: 5.3 MMOL/L (ref 3.5–5.1)
PROT SERPL-MCNC: 7.4 G/DL (ref 6.4–8.2)
SODIUM SERPL-SCNC: 138 MMOL/L (ref 136–145)
VIT B12 SERPL-MCNC: 214 PG/ML (ref 193–986)

## 2020-07-24 RX ORDER — TRAMADOL HYDROCHLORIDE 50 MG/1
50 TABLET ORAL
Qty: 270 TAB | Refills: 0 | Status: SHIPPED | OUTPATIENT
Start: 2020-07-24 | End: 2020-10-06 | Stop reason: SDUPTHER

## 2020-07-24 RX ORDER — GABAPENTIN 300 MG/1
300 CAPSULE ORAL
Qty: 180 CAP | Refills: 0 | Status: SHIPPED | OUTPATIENT
Start: 2020-07-24 | End: 2020-08-04 | Stop reason: SDUPTHER

## 2020-07-24 NOTE — PROGRESS NOTES
[facility]    History of Present Illness:   Antony Pierce is a 75 y.o. female with history of Diabetes Mellitus, Hypertension, Chronic Pain, TIA  CC: Follow up Blood pressure  History provided by patient and Records    HPI:    Calf: Bilateral calf pain/tenderness particularly with walking or standing for short periods. Started 3 weeks ago, patient hada surgery 4 weeks ago. Hypertension Follow up:  Currently Taking Ramipril 10 mg, Amlodipine 5 mg. The patient reports:  taking medications as instructed, no medication side effects noted, home BP monitoring in range of 976-682'L systolic over 36-97'M diastolic, no TIA's, no chest pain on exertion, no dyspnea on exertion, no swelling of ankles. BP Readings from Last 3 Encounters:   07/24/20 171/73   06/08/20 137/61      Diabetes: Blood sugars well controlled at this time. Health Maintenance  Health Maintenance Due   Topic Date Due    Foot Exam Q1  08[birth year]19/1954    Eye Exam Retinal or Dilated  08[birth year]19/1954    DTaP/Tdap/Td series (1 - Tdap) 08/19/1965    Shingrix Vaccine Age 50> (1 of 2) 08/19/1994    GLAUCOMA SCREENING Q2Y  08/19/2009    Bone Densitometry (Dexa) Screening  08/19/2009    Pneumococcal 65+ years (1 of 1 - PPSV23) 08/19/2009    Medicare Yearly Exam  06/16/2020       Past Medical, Family, and Social History:     Current Outpatient Medications on File Prior to Visit   Medication Sig Dispense Refill    metFORMIN (GLUCOPHAGE) 1,000 mg tablet Take 1 Tab by mouth two (2) times a day. 180 Tab 0    ramipriL (ALTACE) 10 mg capsule Take 1 Cap by mouth two (2) times a day. 180 Cap 0    simvastatin (ZOCOR) 20 mg tablet Take 1 Tab by mouth nightly. 90 Tab 0    clopidogreL (PLAVIX) 75 mg tab Take 1 Tab by mouth daily. 90 Tab 0    pantoprazole (PROTONIX) 40 mg tablet Take 1 Tab by mouth daily. 90 Tab 0    insulin detemir U-100 (LEVEMIR) 100 unit/mL injection 25 Units by SubCUTAneous route two (2) times a day.  45 mL 1    amLODIPine (NORVASC) 5 mg tablet Take 5 mg by mouth daily.  famotidine (PEPCID) 40 mg tablet Take 40 mg by mouth daily.  diclofenac EC (VOLTAREN) 75 mg EC tablet Take 75 mg by mouth daily.  loratadine (Claritin) 10 mg tablet Take 10 mg by mouth two (2) times a day.  insulin aspart U-100 (NovoLOG U-100 Insulin aspart) 100 unit/mL injection by SubCUTAneous route as needed.  [DISCONTINUED] traMADoL (ULTRAM) 50 mg tablet Take 50 mg by mouth every four to six (4-6) hours as needed.  [DISCONTINUED] gabapentin (NEURONTIN) 300 mg capsule Take 1 Cap by mouth nightly. Max Daily Amount: 300 mg. 90 Cap 0     No current facility-administered medications on file prior to visit.         Patient Active Problem List   Diagnosis Code    Chronic pain G89.29    Type 2 diabetes mellitus with stage 2 chronic kidney disease, with long-term current use of insulin ([facility]) E11.22, N18.2, Z79.4    Essential hypertension I10    TIA (transient ischemic attack) G45.9    Type 2 diabetes mellitus with diabetic neuropathy ([facility]) E11.40       Social History     Socioeconomic History    Marital status:      Spouse name: Not on file    Number of children: Not on file    Years of education: Not on file    Highest education level: Not on file   Occupational History    Not on file   Social Needs    Financial resource strain: Not on file    Food insecurity     Worry: Not on file     Inability: Not on file    Transportation needs     Medical: Not on file     Non-medical: Not on file   Tobacco Use    Smoking status: Never Smoker    Smokeless tobacco: Never Used   Substance and Sexual Activity    Alcohol use: Never     Frequency: Never    Drug use: Never    Sexual activity: Not on file   Lifestyle    Physical activity     Days per week: Not on file     Minutes per session: Not on file    Stress: Not on file   Relationships    Social connections     Talks on phone: Not on file     Gets together: Not on file     Attends Sabianism service: Not on file     Active member of club or organization: Not on file     Attends meetings of clubs or organizations: Not on file     Relationship status: Not on file    Intimate partner violence     Fear of current or ex partner: Not on file     Emotionally abused: Not on file     Physically abused: Not on file     Forced sexual activity: Not on file   Other Topics Concern    Not on file   Social History Narrative    [employer] school and course of Psychology       Review of Systems   Review of Systems   Constitutional: Negative for chills and fever. HENT: Negative for congestion. Respiratory: Negative for cough. Cardiovascular: Negative for chest pain. Gastrointestinal: Negative for nausea and vomiting. Musculoskeletal: Positive for myalgias. Neurological: Negative for headaches. Objective:     Visit Vitals  /73 (BP 1 Location: Right arm, BP Patient Position: Sitting)   Pulse 71   Temp 97.3 °F (36.3 °C) (Oral)   Resp 18   Ht 5' 5\" (1.651 m)   Wt 155 lb 6.4 oz (70.5 kg)   SpO2 97%   BMI 25.86 kg/m²        Physical Exam  Vitals signs and nursing note reviewed. Constitutional:       Appearance: Normal appearance. Neck:      Musculoskeletal: Normal range of motion and neck supple. Cardiovascular:      Rate and Rhythm: Normal rate and regular rhythm. Pulses: Normal pulses. Heart sounds: Normal heart sounds. Pulmonary:      Effort: Pulmonary effort is normal.      Breath sounds: Normal breath sounds. Abdominal:      General: Abdomen is flat. Bowel sounds are normal.      Palpations: Abdomen is soft. Musculoskeletal:      Comments: Right Calf Pain/Tenderness Present in the calf, negative Hohmann's sign   Neurological:      Mental Status: She is alert.        Diabetic foot exam:     Left Foot:   Visual Exam: normal    Pulse DP: 2+ (normal)   Filament test: reduced sensation    Vibratory sensation: diminished      Right Foot:   Visual Exam: normal    Pulse DP: 2+ (normal)   Filament test: reduced sensation    Vibratory sensation: diminished    Pertinent Labs/Studies:      Assessment and orders:       ICD-10-CM ICD-9-CM    1. Leg cramping  S69.6 075.93 METABOLIC PANEL, COMPREHENSIVE      HGB & HCT      VITAMIN B12      FOLATE      VITAMIN D, 25 HYDROXY      D DIMER   2. Type 2 diabetes mellitus with diabetic neuropathy, with long-term current use of insulin ([facility])  E11.40 250.60  DIABETES FOOT EXAM    Z79.4 357.2      V58.67    3. Peripheral vascular disease ([facility])  I73.9 443.9    4. Ovarian mass  N83.8 620.8    5. Neuropathic pain  M79.2 729.2 gabapentin (NEURONTIN) 300 mg capsule      traMADoL (ULTRAM) 50 mg tablet   6. Essential hypertension  I10 401.9      Diagnoses and all orders for this visit:    1. Leg cramping: Need to rule out DVT, checking medications  -     METABOLIC PANEL, COMPREHENSIVE; Future  -     HGB & HCT; Future  -     VITAMIN B12; Future  -     FOLATE; Future  -     VITAMIN D, 25 HYDROXY; Future  -     D DIMER; Future    2. Type 2 diabetes mellitus with diabetic neuropathy, with long-term current use of insulin ([facility])  -      DIABETES FOOT EXAM    3. Ovarian mass: S/p surgery, Benign    4. Neuropathic pain: Refill   -     gabapentin (NEURONTIN) 300 mg capsule; Take 1 Cap by mouth nightly. Max Daily Amount: 300 mg.  -     traMADoL (ULTRAM) 50 mg tablet; Take 1 Tab by mouth every eight (8) hours as needed for Pain for up to 90 days. Max Daily Amount: 150 mg.    5. Essential hypertension: Our goal is to normalize the blood pressure to decrease the risks of strokes and heart attacks. The patient is in agreement with the plan. Follow-up and Dispositions    · Return in about 1 week (around 7/31/2020) for Follow up leg pain. I have discussed the diagnosis with the patient and the intended plan as seen in the above orders. Social history, medical history, and labs were reviewed.   The patient has received an after-visit summary and questions were answered concerning future plans. I have discussed medication side effects and warnings with the patient as well.     MD HOUSTON Alvarez & ALONDRA WOODS [facility]  07/24/20

## 2020-07-24 NOTE — PROGRESS NOTES
Chief Complaint   Patient presents with    Leg Pain     both, calf     Visit Vitals  /66 (BP 1 Location: Left arm, BP Patient Position: Sitting)   Pulse 71   Temp 97.3 °F (36.3 °C) (Oral)   Resp 18   Ht 5' 5\" (1.651 m)   Wt 155 lb 6.4 oz (70.5 kg)   SpO2 97%   BMI 25.86 kg/m²     1. Have you been to the ER, urgent care clinic since your last visit? Hospitalized since your last visit? No    2. Have you seen or consulted any other health care providers outside of the [address] [place] [address] since your last visit? Include any pap smears or colon screening.  No    Reviewed record in preparation for visit and have necessary documentation  Pt did not bring medication to office visit for review  opportunity was given for questions  Goals that were addressed and/or need to be completed during or after this appointment include   Health Maintenance Due   Topic Date Due    Foot Exam Q1  08/19/1954    Eye Exam Retinal or Dilated  08/19/1954    DTaP/Tdap/Td series (1 - Tdap) 08/19/1965    Shingrix Vaccine Age 50> (1 of 2) 08/19/1994    GLAUCOMA SCREENING Q2Y  08/19/2009    Bone Densitometry (Dexa) Screening  08/19/2009    Pneumococcal 65+ years (1 of 1 - PPSV23) 08/19/2009    Medicare Yearly Exam  06/16/2020

## 2020-07-27 ENCOUNTER — TELEPHONE (OUTPATIENT)
Dept: FAMILY MEDICINE CLINIC | Age: 75
End: 2020-07-27

## 2020-07-27 DIAGNOSIS — M79.661 BILATERAL CALF PAIN: Primary | ICD-10-CM

## 2020-07-27 DIAGNOSIS — M79.662 BILATERAL CALF PAIN: Primary | ICD-10-CM

## 2020-07-27 RX ORDER — LANOLIN ALCOHOL/MO/W.PET/CERES
500 CREAM (GRAM) TOPICAL DAILY
Qty: 90 TAB | Refills: 1 | Status: SHIPPED | OUTPATIENT
Start: 2020-07-27 | End: 2020-10-06 | Stop reason: SDUPTHER

## 2020-07-27 RX ORDER — ACETAMINOPHEN 500 MG
2000 TABLET ORAL DAILY
Qty: 90 CAP | Refills: 1 | Status: SHIPPED | OUTPATIENT
Start: 2020-07-27 | End: 2020-10-06 | Stop reason: SDUPTHER

## 2020-07-27 NOTE — TELEPHONE ENCOUNTER
Called to discuss labs. Patient has been vomiting and having diarrhea for the last several days, Daughter is concerned about her getting dehydrated. Patient has been more fatigued as well. Patient still tolerating food and fluids now.     MD HOUSTON Alvarez & ALONDRA WOODS [facility]  07/27/20

## 2020-07-28 ENCOUNTER — HOSPITAL ENCOUNTER (OUTPATIENT)
Dept: ULTRASOUND IMAGING | Age: 75
Discharge: HOME | End: 2020-07-28
Attending: FAMILY MEDICINE
Payer: MEDICARE

## 2020-07-28 DIAGNOSIS — M79.661 BILATERAL CALF PAIN: ICD-10-CM

## 2020-07-28 DIAGNOSIS — M79.662 BILATERAL CALF PAIN: ICD-10-CM

## 2020-07-28 PROCEDURE — 93970 EXTREMITY STUDY: CPT

## 2020-07-29 ENCOUNTER — TELEPHONE (OUTPATIENT)
Dept: FAMILY MEDICINE CLINIC | Age: 75
End: 2020-07-29

## 2020-07-29 NOTE — TELEPHONE ENCOUNTER
Called and discussed negative Duplex results, no sign of DVT. Paitent per daughter is doing much better at this time.     MD HOUSTON Rivera & ALONDRA WOODS [facility]  07/29/20

## 2020-07-31 ENCOUNTER — TELEPHONE (OUTPATIENT)
Dept: FAMILY MEDICINE CLINIC | Age: 75
End: 2020-07-31

## 2020-07-31 DIAGNOSIS — R19.7 DIARRHEA, UNSPECIFIED TYPE: Primary | ICD-10-CM

## 2020-07-31 RX ORDER — ALUMINUM ZIRCONIUM OCTACHLOROHYDREX GLY 16 G/100G
1 GEL TOPICAL DAILY
Qty: 30 CAP | Refills: 1 | Status: SHIPPED | OUTPATIENT
Start: 2020-07-31 | End: 2021-08-05 | Stop reason: SDUPTHER

## 2020-07-31 NOTE — TELEPHONE ENCOUNTER
Spoke with daughter, daughter states patient was doing better but starting yesterday she started to decline, diarrhea is bad, she is not eating, only wants to sleep and weak, she is pushing fluids,  Results from Covid test are not back yet,  Daughter wanted to double check to see if she needs to go to ER or what can she do?

## 2020-08-03 ENCOUNTER — PATIENT MESSAGE (OUTPATIENT)
Dept: FAMILY MEDICINE CLINIC | Age: 75
End: 2020-08-03

## 2020-08-03 DIAGNOSIS — M79.2 NEUROPATHIC PAIN: ICD-10-CM

## 2020-08-04 RX ORDER — GABAPENTIN 300 MG/1
300 CAPSULE ORAL
Qty: 90 CAP | Refills: 0 | Status: SHIPPED | OUTPATIENT
Start: 2020-08-04 | End: 2020-08-11 | Stop reason: SDUPTHER

## 2020-08-04 NOTE — TELEPHONE ENCOUNTER
From: Coleen Dia  To: Chris Milan MD  Sent: 8/3/2020 5:40 PM EDT  Subject: Prescription Question    I talked to mom's prescription mail order group this afternoon because we have not received her gabapentin script yet. They said that the script was filled out for 180 days instead of 90 days. LOL. .. See we got you all confused!! Can you please send a new script for her for Qty 180 for 90 day supply? Thank you so much! !    Also, she is finally getting better. Today is the first day with no diarrhea and no vomiting.  WooHoo, I think the probiotic is really helping!!    Thanks again and have a great week!!

## 2020-08-10 ENCOUNTER — PATIENT MESSAGE (OUTPATIENT)
Dept: FAMILY MEDICINE CLINIC | Age: 75
End: 2020-08-10

## 2020-08-10 DIAGNOSIS — M79.2 NEUROPATHIC PAIN: ICD-10-CM

## 2020-08-11 ENCOUNTER — TELEPHONE (OUTPATIENT)
Dept: FAMILY MEDICINE CLINIC | Age: 75
End: 2020-08-11

## 2020-08-11 DIAGNOSIS — Z13.5 DIABETIC RETINOPATHY SCREENING: ICD-10-CM

## 2020-08-11 DIAGNOSIS — Z13.5 GLAUCOMA SCREENING: Primary | ICD-10-CM

## 2020-08-11 RX ORDER — GABAPENTIN 300 MG/1
600 CAPSULE ORAL
Qty: 180 CAP | Refills: 0 | Status: SHIPPED | OUTPATIENT
Start: 2020-08-11 | End: 2020-10-06 | Stop reason: SDUPTHER

## 2020-08-11 NOTE — TELEPHONE ENCOUNTER
Referral obtained from [facility] and faxed to [address] [place] [address] number [id number]  Exp date 08/11/2020

## 2020-08-11 NOTE — TELEPHONE ENCOUNTER
From: Sanjiv Valencia  To: Wander Teran MD  Sent: 8/10/2020 10:48 AM EDT  Subject: Visit Follow-Up Question    Good morning! Mom is doing better but still having a few issues. I would like to see if we can get her in again for you to see her again please. Also need to double check the Gabapentin prescription because they just tried to fill it for a 90 day supply quantity 90 and it should be 90 day supply quantity 180.

## 2020-08-11 NOTE — TELEPHONE ENCOUNTER
Daughter is calling and states the Pt is at the [facility] in [place] - fax  - need a referral dated today with authorization number. Phone number is . The insurance stated that no referral was need but facility scheduled her with an Opthalmology so she does need a referral. Asking Nurse Nasim Delong to please call the daughter. They are there now and are going to charge her $150.00 today if she has no referral. Please call daughter on cell.

## 2020-08-20 ENCOUNTER — OFFICE VISIT (OUTPATIENT)
Dept: FAMILY MEDICINE CLINIC | Age: 75
End: 2020-08-20
Payer: MEDICARE

## 2020-08-20 VITALS
BODY MASS INDEX: 24.53 KG/M2 | RESPIRATION RATE: 18 BRPM | SYSTOLIC BLOOD PRESSURE: 165 MMHG | TEMPERATURE: 97.3 F | DIASTOLIC BLOOD PRESSURE: 71 MMHG | HEART RATE: 76 BPM | WEIGHT: 147.2 LBS | OXYGEN SATURATION: 97 % | HEIGHT: 65 IN

## 2020-08-20 DIAGNOSIS — I10 ESSENTIAL HYPERTENSION: Chronic | ICD-10-CM

## 2020-08-20 DIAGNOSIS — B02.29 POST HERPETIC NEURALGIA: Primary | ICD-10-CM

## 2020-08-20 PROCEDURE — G8536 NO DOC ELDER MAL SCRN: HCPCS | Performed by: FAMILY MEDICINE

## 2020-08-20 PROCEDURE — 1101F PT FALLS ASSESS-DOCD LE1/YR: CPT | Performed by: FAMILY MEDICINE

## 2020-08-20 PROCEDURE — 1090F PRES/ABSN URINE INCON ASSESS: CPT | Performed by: FAMILY MEDICINE

## 2020-08-20 PROCEDURE — G8400 PT W/DXA NO RESULTS DOC: HCPCS | Performed by: FAMILY MEDICINE

## 2020-08-20 PROCEDURE — G8753 SYS BP > OR = 140: HCPCS | Performed by: FAMILY MEDICINE

## 2020-08-20 PROCEDURE — G8427 DOCREV CUR MEDS BY ELIG CLIN: HCPCS | Performed by: FAMILY MEDICINE

## 2020-08-20 PROCEDURE — 99213 OFFICE O/P EST LOW 20 MIN: CPT | Performed by: FAMILY MEDICINE

## 2020-08-20 PROCEDURE — G8432 DEP SCR NOT DOC, RNG: HCPCS | Performed by: FAMILY MEDICINE

## 2020-08-20 PROCEDURE — G8754 DIAS BP LESS 90: HCPCS | Performed by: FAMILY MEDICINE

## 2020-08-20 PROCEDURE — G8420 CALC BMI NORM PARAMETERS: HCPCS | Performed by: FAMILY MEDICINE

## 2020-08-20 RX ORDER — TRIAMCINOLONE ACETONIDE 1 MG/G
OINTMENT TOPICAL 2 TIMES DAILY
Qty: 80 G | Refills: 1 | Status: SHIPPED | OUTPATIENT
Start: 2020-08-20

## 2020-08-20 RX ORDER — AMLODIPINE BESYLATE 5 MG/1
5 TABLET ORAL DAILY
Qty: 90 TAB | Refills: 1 | Status: SHIPPED | OUTPATIENT
Start: 2020-08-20 | End: 2020-10-06 | Stop reason: SDUPTHER

## 2020-08-20 NOTE — PROGRESS NOTES
[facility]    History of Present Illness:   Amanda Downing is a 67 y.o. female with history of HTN, Post herpetic neuralgia, TIA, Diabetes  CC: Dizziness  History provided by patient and Records    HPI:  Patient dizziness resolved with decreasing Gabapentin back to 1 pill daily. Noting though the itching related to old Herpes zoster is persistent now, though not unbearable. Has tried Capsacin cream while on Gabapentin. Hypertension Follow up:  Currently Taking Amlodipine 5 mg, Ramipril 10 mg. The patient reports:  taking medications as instructed, no medication side effects noted, no TIA's, no chest pain on exertion, no dyspnea on exertion, no swelling of ankles. BP Readings from Last 3 Encounters:   08/20/20 165/71   07/24/20 171/73   06/08/20 137/61        Health Maintenance  Health Maintenance Due   Topic Date Due    Eye Exam Retinal or Dilated  08/19/1954    DTaP/Tdap/Td series (1 - Tdap) 08/19/1965    Shingrix Vaccine Age 50> (1 of 2) 08/19/1994    GLAUCOMA SCREENING Q2Y  08/19/2009    Bone Densitometry (Dexa) Screening  08/19/2009    Pneumococcal 65+ years (1 of 1 - PPSV23) 08/19/2009    Medicare Yearly Exam  06/16/2020       Past Medical, Family, and Social History:     Current Outpatient Medications on File Prior to Visit   Medication Sig Dispense Refill    gabapentin (NEURONTIN) 300 mg capsule Take 2 Caps by mouth nightly. Max Daily Amount: 600 mg. (Patient taking differently: Take 300 mg by mouth nightly.) 180 Cap 0    Bifidobacterium Infantis (Align) 4 mg cap Take 1 Cap by mouth daily. 30 Cap 1    cyanocobalamin (VITAMIN B12) 500 mcg tablet Take 1 Tab by mouth daily. 90 Tab 1    cholecalciferol (VITAMIN D3) (2,000 UNITS /50 MCG) cap capsule Take 2,000 Units by mouth daily. 90 Cap 1    traMADoL (ULTRAM) 50 mg tablet Take 1 Tab by mouth every eight (8) hours as needed for Pain for up to 90 days.  Max Daily Amount: 150 mg. 270 Tab 0    metFORMIN (GLUCOPHAGE) 1,000 mg tablet Take 1 Tab by mouth two (2) times a day. 180 Tab 0    ramipriL (ALTACE) 10 mg capsule Take 1 Cap by mouth two (2) times a day. 180 Cap 0    simvastatin (ZOCOR) 20 mg tablet Take 1 Tab by mouth nightly. 90 Tab 0    clopidogreL (PLAVIX) 75 mg tab Take 1 Tab by mouth daily. 90 Tab 0    pantoprazole (PROTONIX) 40 mg tablet Take 1 Tab by mouth daily. 90 Tab 0    insulin detemir U-100 (LEVEMIR) 100 unit/mL injection 25 Units by SubCUTAneous route two (2) times a day. 45 mL 1    famotidine (PEPCID) 40 mg tablet Take 40 mg by mouth daily.  diclofenac EC (VOLTAREN) 75 mg EC tablet Take 75 mg by mouth daily.  loratadine (Claritin) 10 mg tablet Take 10 mg by mouth two (2) times a day.  insulin aspart U-100 (NovoLOG U-100 Insulin aspart) 100 unit/mL injection by SubCUTAneous route as needed.  [DISCONTINUED] amLODIPine (NORVASC) 5 mg tablet Take 5 mg by mouth daily. No current facility-administered medications on file prior to visit.         Patient Active Problem List   Diagnosis Code    Chronic pain G89.29    Type 2 diabetes mellitus with stage 2 chronic kidney disease, with long-term current use of insulin [facility] E11.22, N18.2, Z79.4    Essential hypertension I10    TIA (transient ischemic attack) G45.9    Type 2 diabetes mellitus with diabetic neuropathy ([facility]) E11.40       Social History     Socioeconomic History    Marital status:      Spouse name: Not on file    Number of children: Not on file    Years of education: Not on file    Highest education level: Not on file   Occupational History    Not on file   Social Needs    Financial resource strain: Not on file    Food insecurity     Worry: Not on file     Inability: Not on file    Transportation needs     Medical: Not on file     Non-medical: Not on file   Tobacco Use    Smoking status: Never Smoker    Smokeless tobacco: Never Used   Substance and Sexual Activity    Alcohol use: Never     Frequency: Never    Drug use: Never    Sexual activity: Not on file   Lifestyle    Physical activity     Days per week: Not on file     Minutes per session: Not on file    Stress: Not on file   Relationships    Social connections     Talks on phone: Not on file     Gets together: Not on file     Attends Judaism service: Not on file     Active member of club or organization: Not on file     Attends meetings of clubs or organizations: Not on file     Relationship status: Not on file    Intimate partner violence     Fear of current or ex partner: Not on file     Emotionally abused: Not on file     Physically abused: Not on file     Forced sexual activity: Not on file   Other Topics Concern    Not on file   Social History Narrative    [employer] school and course of Psychology       Review of Systems   Review of Systems   Constitutional: Negative for chills, fever and malaise/fatigue. Musculoskeletal: Negative for myalgias. Neurological: Negative for dizziness, speech change, focal weakness and headaches. Psychiatric/Behavioral: Negative for depression. Objective:     Visit Vitals  /71 (BP 1 Location: Right arm, BP Patient Position: Sitting)   Pulse 76   Temp 97.3 °F (36.3 °C) (Temporal)   Resp 18   Ht 5' 5\" (1.651 m)   Wt 147 lb 3.2 oz (66.8 kg)   SpO2 97%   BMI 24.50 kg/m²        Physical Exam  Vitals signs and nursing note reviewed. Constitutional:       Appearance: Normal appearance. HENT:      Head: Normocephalic and atraumatic. Neck:      Musculoskeletal: Normal range of motion and neck supple. Cardiovascular:      Rate and Rhythm: Normal rate and regular rhythm. Pulses: Normal pulses. Heart sounds: Normal heart sounds. No murmur. No friction rub. No gallop. Pulmonary:      Effort: Pulmonary effort is normal.      Breath sounds: Normal breath sounds. Abdominal:      General: Abdomen is flat. Bowel sounds are normal.      Palpations: Abdomen is soft.    Musculoskeletal:         General: No swelling. Skin:     General: Skin is warm and dry. Neurological:      General: No focal deficit present. Mental Status: She is alert and oriented to person, place, and time. Cranial Nerves: No cranial nerve deficit. Sensory: No sensory deficit. Motor: No weakness. Coordination: Coordination normal.      Gait: Gait normal.   Psychiatric:         Mood and Affect: Mood normal.         Behavior: Behavior normal.         Thought Content: Thought content normal.         Judgment: Judgment normal.         Pertinent Labs/Studies:      Assessment and orders:       ICD-10-CM ICD-9-CM    1. Post herpetic neuralgia  B02.29 053.19 triamcinolone acetonide (KENALOG) 0.1 % ointment   2. Essential hypertension  I10 401.9 amLODIPine (NORVASC) 5 mg tablet     Diagnoses and all orders for this visit:    1. Post herpetic neuralgia: Dizziness/Confusion resolved with decreased Gabapentin. Trial of Kenalog cream to see if will reduce itching sensation  -     triamcinolone acetonide (KENALOG) 0.1 % ointment; Apply  to affected area two (2) times a day. use thin layer on upper back    2. Essential hypertension: Refill on medication  -     amLODIPine (NORVASC) 5 mg tablet; Take 1 Tab by mouth daily. Follow-up and Dispositions    · Return in about 2 weeks (around 9/3/2020) for Follow up for labs in office. I have discussed the diagnosis with the patient and the intended plan as seen in the above orders. Social history, medical history, and labs were reviewed. The patient has received an after-visit summary and questions were answered concerning future plans. I have discussed medication side effects and warnings with the patient as well.     MD HOUSTON Bill & ALONDRA WOODS [facility]  08/20/20

## 2020-08-20 NOTE — PROGRESS NOTES
Chief Complaint   Patient presents with    Dizziness    Other     off balance     Visit Vitals  /71 (BP 1 Location: Right arm, BP Patient Position: Sitting)   Pulse 76   Temp 97.3 °F (36.3 °C) (Temporal)   Resp 18   Ht 5' 5\" (1.651 m)   Wt 147 lb 3.2 oz (66.8 kg)   SpO2 97%   BMI 24.50 kg/m²     1. Have you been to the ER, urgent care clinic since your last visit? Hospitalized since your last visit? No    2. Have you seen or consulted any other health care providers outside of the [address] [place] [address] since your last visit? Include any pap smears or colon screening.  No    Reviewed record in preparation for visit and have necessary documentation  Pt did not bring medication to office visit for review  opportunity was given for questions  Goals that were addressed and/or need to be completed during or after this appointment include   Health Maintenance Due   Topic Date Due    Eye Exam Retinal or Dilated  08[birth year]19/1954    DTaP/Tdap/Td series (1 - Tdap) 08/19/1965    Shingrix Vaccine Age 50> (1 of 2) 08/19/1994    GLAUCOMA SCREENING Q2Y  08/19/2009    Bone Densitometry (Dexa) Screening  08/19/2009    Pneumococcal 65+ years (1 of 1 - PPSV23) 08/19/2009    Medicare Yearly Exam  06/16/2020

## 2020-10-22 ENCOUNTER — OFFICE VISIT (OUTPATIENT)
Dept: FAMILY MEDICINE CLINIC | Age: 75
End: 2020-10-22
Payer: MEDICARE

## 2020-10-22 VITALS
DIASTOLIC BLOOD PRESSURE: 64 MMHG | WEIGHT: 150 LBS | BODY MASS INDEX: 24.96 KG/M2 | TEMPERATURE: 97.2 F | SYSTOLIC BLOOD PRESSURE: 177 MMHG | OXYGEN SATURATION: 100 % | RESPIRATION RATE: 20 BRPM | HEART RATE: 80 BPM

## 2020-10-22 DIAGNOSIS — I10 ESSENTIAL HYPERTENSION: Chronic | ICD-10-CM

## 2020-10-22 DIAGNOSIS — M25.541 ARTHRALGIA OF BOTH HANDS: ICD-10-CM

## 2020-10-22 DIAGNOSIS — E55.9 VITAMIN D DEFICIENCY: ICD-10-CM

## 2020-10-22 DIAGNOSIS — M25.542 ARTHRALGIA OF BOTH HANDS: ICD-10-CM

## 2020-10-22 DIAGNOSIS — E78.2 MIXED HYPERLIPIDEMIA: ICD-10-CM

## 2020-10-22 DIAGNOSIS — H81.10 BENIGN PAROXYSMAL POSITIONAL VERTIGO, UNSPECIFIED LATERALITY: ICD-10-CM

## 2020-10-22 DIAGNOSIS — E53.8 B12 DEFICIENCY: ICD-10-CM

## 2020-10-22 DIAGNOSIS — E11.40 TYPE 2 DIABETES MELLITUS WITH DIABETIC NEUROPATHY, WITHOUT LONG-TERM CURRENT USE OF INSULIN (HCC): Primary | Chronic | ICD-10-CM

## 2020-10-22 DIAGNOSIS — Z23 ENCOUNTER FOR IMMUNIZATION: ICD-10-CM

## 2020-10-22 LAB
ALBUMIN SERPL-MCNC: 3.6 G/DL (ref 3.5–5)
ALBUMIN/GLOB SERPL: 0.9 {RATIO} (ref 1.1–2.2)
ALP SERPL-CCNC: 141 U/L (ref 45–117)
ALT SERPL-CCNC: 15 U/L (ref 12–78)
ANION GAP SERPL CALC-SCNC: 6 MMOL/L (ref 5–15)
AST SERPL-CCNC: 16 U/L (ref 15–37)
BILIRUB SERPL-MCNC: 0.2 MG/DL (ref 0.2–1)
BUN SERPL-MCNC: 10 MG/DL (ref 6–20)
BUN/CREAT SERPL: 9 (ref 12–20)
CALCIUM SERPL-MCNC: 9.3 MG/DL (ref 8.5–10.1)
CHLORIDE SERPL-SCNC: 106 MMOL/L (ref 97–108)
CO2 SERPL-SCNC: 28 MMOL/L (ref 21–32)
CREAT SERPL-MCNC: 1.15 MG/DL (ref 0.55–1.02)
ERYTHROCYTE [DISTWIDTH] IN BLOOD BY AUTOMATED COUNT: 18.6 % (ref 11.5–14.5)
EST. AVERAGE GLUCOSE BLD GHB EST-MCNC: 169 MG/DL
GLOBULIN SER CALC-MCNC: 3.8 G/DL (ref 2–4)
GLUCOSE SERPL-MCNC: 118 MG/DL (ref 65–100)
HBA1C MFR BLD: 7.5 % (ref 4–5.6)
HCT VFR BLD AUTO: 34.5 % (ref 35–47)
HGB BLD-MCNC: 10.1 G/DL (ref 11.5–16)
MCH RBC QN AUTO: 23 PG (ref 26–34)
MCHC RBC AUTO-ENTMCNC: 29.3 G/DL (ref 30–36.5)
MCV RBC AUTO: 78.6 FL (ref 80–99)
NRBC # BLD: 0 K/UL (ref 0–0.01)
NRBC BLD-RTO: 0 PER 100 WBC
PLATELET # BLD AUTO: 359 K/UL (ref 150–400)
PMV BLD AUTO: 10.2 FL (ref 8.9–12.9)
POTASSIUM SERPL-SCNC: 5.1 MMOL/L (ref 3.5–5.1)
PROT SERPL-MCNC: 7.4 G/DL (ref 6.4–8.2)
RBC # BLD AUTO: 4.39 M/UL (ref 3.8–5.2)
SODIUM SERPL-SCNC: 140 MMOL/L (ref 136–145)
WBC # BLD AUTO: 8.1 K/UL (ref 3.6–11)

## 2020-10-22 PROCEDURE — G0008 ADMIN INFLUENZA VIRUS VAC: HCPCS | Performed by: FAMILY MEDICINE

## 2020-10-22 PROCEDURE — 1090F PRES/ABSN URINE INCON ASSESS: CPT | Performed by: FAMILY MEDICINE

## 2020-10-22 PROCEDURE — 3051F HG A1C>EQUAL 7.0%<8.0%: CPT | Performed by: FAMILY MEDICINE

## 2020-10-22 PROCEDURE — 1101F PT FALLS ASSESS-DOCD LE1/YR: CPT | Performed by: FAMILY MEDICINE

## 2020-10-22 PROCEDURE — G8536 NO DOC ELDER MAL SCRN: HCPCS | Performed by: FAMILY MEDICINE

## 2020-10-22 PROCEDURE — G8432 DEP SCR NOT DOC, RNG: HCPCS | Performed by: FAMILY MEDICINE

## 2020-10-22 PROCEDURE — 90694 VACC AIIV4 NO PRSRV 0.5ML IM: CPT | Performed by: FAMILY MEDICINE

## 2020-10-22 PROCEDURE — 99214 OFFICE O/P EST MOD 30 MIN: CPT | Performed by: FAMILY MEDICINE

## 2020-10-22 PROCEDURE — G8754 DIAS BP LESS 90: HCPCS | Performed by: FAMILY MEDICINE

## 2020-10-22 PROCEDURE — G8427 DOCREV CUR MEDS BY ELIG CLIN: HCPCS | Performed by: FAMILY MEDICINE

## 2020-10-22 PROCEDURE — G8420 CALC BMI NORM PARAMETERS: HCPCS | Performed by: FAMILY MEDICINE

## 2020-10-22 PROCEDURE — G8753 SYS BP > OR = 140: HCPCS | Performed by: FAMILY MEDICINE

## 2020-10-22 PROCEDURE — G8400 PT W/DXA NO RESULTS DOC: HCPCS | Performed by: FAMILY MEDICINE

## 2020-10-22 RX ORDER — MECLIZINE HCL 12.5 MG 12.5 MG/1
12.5 TABLET ORAL
Qty: 30 TAB | Refills: 1 | Status: SHIPPED | OUTPATIENT
Start: 2020-10-22 | End: 2020-12-26 | Stop reason: SDUPTHER

## 2020-10-22 NOTE — PROGRESS NOTES
1. Have you been to the ER, urgent care clinic since your last visit? Hospitalized since your last visit? No    2. Have you seen or consulted any other health care providers outside of the [address] [place] [address] since your last visit? Include any pap smears or colon screening. No    Reviewed record in preparation for visit and have necessary documentation  Goals that were addressed and/or need to be completed during or after this appointment include     Health Maintenance Due   Topic Date Due    Eye Exam Retinal or Dilated  08/19/1955    DTaP/Tdap/Td series (1 - Tdap) 08/19/1965    Shingrix Vaccine Age 50> (1 of 2) 08/19/1994    GLAUCOMA SCREENING Q2Y  08/19/2009    Bone Densitometry (Dexa) Screening  08/19/2009    Pneumococcal 65+ years (1 of 1 - PPSV23) 08/19/2009    Medicare Yearly Exam  06/16/2020    Flu Vaccine (1) 09/01/2020       Patient is accompanied by self I have received verbal consent from Simmie Members to discuss any/all medical information while they are present in the room.

## 2020-10-22 NOTE — PROGRESS NOTES
[facility]    History of Present Illness:   Mark Ramires is a 67 y.o. female with history of HTN, TIA, Type 2 Diabetes, Chronic Pain  CC: Follow up Chronic conditions  History provided by patient and Records    HPI:  Hypertension Follow up:  Currently Taking Amlodipine 5 mg daily, Ramipril 10 mg. The patient reports:  taking medications as instructed, no medication side effects noted, no TIA's, no chest pain on exertion, no dyspnea on exertion, no swelling of ankles. BP Readings from Last 3 Encounters:   10/22/20 (!) 177/64   08/20/20 165/71   07/24/20 171/73      Diabetes Follow up: Overall the patient feels well with good energy level. Current Medications:   Key Antihyperglycemic Medications             insulin detemir U-100 (LEVEMIR) 100 unit/mL injection (Taking) 25 Units by SubCUTAneous route two (2) times a day. metFORMIN (GLUCOPHAGE) 1,000 mg tablet (Taking) Take 1 Tab by mouth two (2) times a day. insulin aspart U-100 (NovoLOG U-100 Insulin aspart) 100 unit/mL injection (Taking) by SubCUTAneous route as needed. .   Insulin dependence: YES   Frequency of home glucose testing: Daily      Wt Readings from Last 3 Encounters:   10/22/20 150 lb (68 kg)   08/20/20 147 lb 3.2 oz (66.8 kg)   07/24/20 155 lb 6.4 oz (70.5 kg)     Hypertriglyceridemia Follow up:   Cardiovascular risks for her are: diabetic  hyperlipidemia  prior CVA/TIA or known carotid artery disease. Currently she takes Zocor (simvastatin) , 20 mg. Myalgias: NO   Fatigue: NO   Other side effects: NO     Wt Readings from Last 3 Encounters:   10/22/20 150 lb (68 kg)   08/20/20 147 lb 3.2 oz (66.8 kg)   07/24/20 155 lb 6.4 oz (70.5 kg)      Vertigo:  - Onset: several years ago. - Duration/Timing: intermittent lasting for few seconds. - Frequency: Daily  - Current Symptoms: spinning. denies Tinnitus. - Associated Symptoms: Endorses None. Denies nausea, vomiting, tinnitus, headaches and palpitations. denies loss of consciousness.  - Precipitants: rising from supine position  - Current Vertigo medications: nothing  - Medications/supplements associated with Vertigo: None    - History of Vertigo: No   - History of environmental allergies: No   - History of Migraines: No   - History of CVA/TBI: Yes  - Recent viral illness: No     Health Maintenance  Health Maintenance Due   Topic Date Due    Eye Exam Retinal or Dilated  08[birth year]19/1954    DTaP/Tdap/Td series (1 - Tdap) 08/19/1965    GLAUCOMA SCREENING Q2Y  08/19/2009    Bone Densitometry (Dexa) Screening  08/19/2009    Pneumococcal 65+ years (1 of 1 - PPSV23) 08/19/2009    Medicare Yearly Exam  06/16/2020       Past Medical, Family, and Social History:     Current Outpatient Medications on File Prior to Visit   Medication Sig Dispense Refill    insulin detemir U-100 (LEVEMIR) 100 unit/mL injection 25 Units by SubCUTAneous route two (2) times a day. 45 mL 1    traMADoL (ULTRAM) 50 mg tablet Take 1 Tab by mouth every eight (8) hours as needed for Pain for up to 90 days. Max Daily Amount: 150 mg. 270 Tab 0    cyanocobalamin (VITAMIN B12) 500 mcg tablet Take 1 Tab by mouth daily. 90 Tab 1    cholecalciferol (VITAMIN D3) (2,000 UNITS /50 MCG) cap capsule Take 2,000 Units by mouth daily. 90 Cap 1    gabapentin (NEURONTIN) 300 mg capsule Take 2 Caps by mouth nightly. Max Daily Amount: 600 mg. (Patient taking differently: Take 300 mg by mouth nightly.) 180 Cap 0    amLODIPine (NORVASC) 5 mg tablet Take 1 Tab by mouth daily. 90 Tab 1    metFORMIN (GLUCOPHAGE) 1,000 mg tablet Take 1 Tab by mouth two (2) times a day. 180 Tab 1    ramipriL (ALTACE) 10 mg capsule Take 1 Cap by mouth two (2) times a day. 180 Cap 1    simvastatin (ZOCOR) 20 mg tablet Take 1 Tab by mouth nightly. 90 Tab 1    clopidogreL (PLAVIX) 75 mg tab Take 1 Tab by mouth daily. 90 Tab 1    pantoprazole (PROTONIX) 40 mg tablet Take 1 Tab by mouth daily.  90 Tab 1    triamcinolone acetonide (KENALOG) 0.1 % ointment Apply  to affected area two (2) times a day. use thin layer on upper back 80 g 1    diclofenac EC (VOLTAREN) 75 mg EC tablet Take 75 mg by mouth daily.  loratadine (Claritin) 10 mg tablet Take 10 mg by mouth two (2) times a day.  insulin aspart U-100 (NovoLOG U-100 Insulin aspart) 100 unit/mL injection by SubCUTAneous route as needed.  Bifidobacterium Infantis (Align) 4 mg cap Take 1 Cap by mouth daily. 30 Cap 1    famotidine (PEPCID) 40 mg tablet Take 40 mg by mouth daily. No current facility-administered medications on file prior to visit.         Patient Active Problem List   Diagnosis Code    Chronic pain G89.29    Type 2 diabetes mellitus with stage 2 chronic kidney disease, with long-term current use of insulin ([facility]) E11.22, N18.2, Z79.4    Essential hypertension I10    TIA (transient ischemic attack) G45.9    Type 2 diabetes mellitus with diabetic neuropathy ([facility]) E11.40       Social History     Socioeconomic History    Marital status:      Spouse name: Not on file    Number of children: Not on file    Years of education: Not on file    Highest education level: Not on file   Occupational History    Not on file   Social Needs    Financial resource strain: Not on file    Food insecurity     Worry: Not on file     Inability: Not on file    Transportation needs     Medical: Not on file     Non-medical: Not on file   Tobacco Use    Smoking status: Never Smoker    Smokeless tobacco: Never Used   Substance and Sexual Activity    Alcohol use: Never     Frequency: Never    Drug use: Never    Sexual activity: Not on file   Lifestyle    Physical activity     Days per week: Not on file     Minutes per session: Not on file    Stress: Not on file   Relationships    Social connections     Talks on phone: Not on file     Gets together: Not on file     Attends Jew service: Not on file     Active member of club or organization: Not on file     Attends meetings of clubs or organizations: Not on file     Relationship status: Not on file    Intimate partner violence     Fear of current or ex partner: Not on file     Emotionally abused: Not on file     Physically abused: Not on file     Forced sexual activity: Not on file   Other Topics Concern    Not on file   Social History Narrative    [employer] school and course of Psychology       Review of Systems   Review of Systems   Constitutional: Negative for chills and fever. HENT: Negative for congestion. Respiratory: Negative for cough. Cardiovascular: Negative for chest pain and palpitations. Gastrointestinal: Negative for abdominal pain, nausea and vomiting. Objective:     Visit Vitals  BP (!) 177/64 (BP 1 Location: Left arm, BP Patient Position: Sitting)   Pulse 80   Temp 97.2 °F (36.2 °C) (Oral)   Resp 20   Wt 150 lb (68 kg)   SpO2 100%   BMI 24.96 kg/m²        Physical Exam  Vitals signs and nursing note reviewed. Constitutional:       Appearance: Normal appearance. HENT:      Head: Normocephalic and atraumatic. Neck:      Musculoskeletal: Normal range of motion and neck supple. Cardiovascular:      Rate and Rhythm: Normal rate and regular rhythm. Pulses: Normal pulses. Heart sounds: Normal heart sounds. Pulmonary:      Effort: Pulmonary effort is normal.      Breath sounds: Normal breath sounds. Abdominal:      General: Abdomen is flat. Bowel sounds are normal.      Palpations: Abdomen is soft. Musculoskeletal: Normal range of motion. Skin:     General: Skin is warm and dry. Neurological:      General: No focal deficit present. Mental Status: She is alert and oriented to person, place, and time. Comments: Negative Epley         Pertinent Labs/Studies:      Assessment and orders:       ICD-10-CM ICD-9-CM    1.  Type 2 diabetes mellitus with diabetic neuropathy, without long-term current use of insulin ([facility])  E11.40 250.60 HEMOGLOBIN A1C WITH EAG     357.2 HEMOGLOBIN A1C WITH EAG   2. Essential hypertension  I10 401.9 CBC W/O DIFF      METABOLIC PANEL, COMPREHENSIVE      METABOLIC PANEL, COMPREHENSIVE      CBC W/O DIFF   3. Mixed hyperlipidemia  E78.2 272.2    4. B12 deficiency  E53.8 266.2 VITAMIN B12      VITAMIN B12   5. Vitamin D deficiency  E55.9 268.9 VITAMIN D, 25 HYDROXY      VITAMIN D, 25 HYDROXY   6. Encounter for immunization  Z23 V03.89 pneumococcal 23-valent (PNEUMOVAX 23) 25 mcg/0.5 mL injection      ADMIN INFLUENZA VIRUS VAC      FLU (FLUAD QUAD INFLUENZA VACCINE,QUAD,ADJUVANTED)   7. Benign paroxysmal positional vertigo, unspecified laterality  H81.10 386.11 meclizine (ANTIVERT) 12.5 mg tablet   8. Arthralgia of both hands  M25.541 719.44 REFERRAL TO RHEUMATOLOGY    M25.542       Diagnoses and all orders for this visit:    1. Type 2 diabetes mellitus with diabetic neuropathy, without long-term current use of insulin (HCC)  -     HEMOGLOBIN A1C WITH EAG; Future    2. Essential hypertension: Elevated at this time, need to to see if checks at home and if better, if not will need to increase Amlodipine and consider HCTZ as well  -     CBC W/O DIFF; Future  -     METABOLIC PANEL, COMPREHENSIVE; Future    3. Mixed hyperlipidemia    4. B12 deficiency  -     VITAMIN B12; Future    5. Vitamin D deficiency  -     VITAMIN D, 25 HYDROXY; Future    6. Encounter for immunization  -     pneumococcal 23-valent (PNEUMOVAX 23) 25 mcg/0.5 mL injection; 0.5 mL by IntraMUSCular route once for 1 dose. -     ADMIN INFLUENZA VIRUS VAC  -     FLU (FLUAD QUAD INFLUENZA VACCINE,QUAD,ADJUVANTED)    7. Benign paroxysmal positional vertigo, unspecified laterality: Trial of Meclizine  -     meclizine (ANTIVERT) 12.5 mg tablet; Take 1 Tab by mouth three (3) times daily as needed for Dizziness. 8. Arthralgia of both hands  -     REFERRAL TO RHEUMATOLOGY      Follow-up and Dispositions    · Return in about 3 months (around 1/22/2021).            I have discussed the diagnosis with the patient and the intended plan as seen in the above orders. Social history, medical history, and labs were reviewed. The patient has received an after-visit summary and questions were answered concerning future plans. I have discussed medication side effects and warnings with the patient as well.     MD HOUSTON Coleman & ALONDRA WOODS [facility]  10/22/20

## 2020-10-23 ENCOUNTER — PATIENT MESSAGE (OUTPATIENT)
Dept: FAMILY MEDICINE CLINIC | Age: 75
End: 2020-10-23

## 2020-10-23 LAB
25(OH)D3 SERPL-MCNC: 32.6 NG/ML (ref 30–100)
VIT B12 SERPL-MCNC: >2000 PG/ML (ref 193–986)

## 2020-10-25 NOTE — PROGRESS NOTES
Vitamin D is wnl, B12 is elevated, A1C is mildly elevated, CMP Creatinine/alk phos/Glucose, CBC Microcytic Anemia.

## 2020-11-06 NOTE — TELEPHONE ENCOUNTER
From: Amira Pass  To: Antionette Mayo MD  Sent: 10/23/2020 7:55 AM EDT  Subject: Non-Urgent Medical Question    Would you by chance be able to prescribe Humira for Mom instead of her going to a Rheumatologist to see if it will work first to help with her hands?

## 2020-12-16 ENCOUNTER — OFFICE VISIT (OUTPATIENT)
Dept: RHEUMATOLOGY | Age: 75
End: 2020-12-16
Payer: MEDICARE

## 2020-12-16 VITALS
TEMPERATURE: 97.3 F | RESPIRATION RATE: 16 BRPM | HEART RATE: 77 BPM | OXYGEN SATURATION: 97 % | SYSTOLIC BLOOD PRESSURE: 195 MMHG | HEIGHT: 65 IN | WEIGHT: 151.4 LBS | DIASTOLIC BLOOD PRESSURE: 74 MMHG | BODY MASS INDEX: 25.22 KG/M2

## 2020-12-16 DIAGNOSIS — M47.816 LUMBAR FACET ARTHROPATHY: ICD-10-CM

## 2020-12-16 DIAGNOSIS — Z79.899 ONGOING USE OF POSSIBLY TOXIC MEDICATION: ICD-10-CM

## 2020-12-16 DIAGNOSIS — M15.4 EROSIVE OSTEOARTHRITIS: Primary | ICD-10-CM

## 2020-12-16 PROCEDURE — G8400 PT W/DXA NO RESULTS DOC: HCPCS | Performed by: INTERNAL MEDICINE

## 2020-12-16 PROCEDURE — 1090F PRES/ABSN URINE INCON ASSESS: CPT | Performed by: INTERNAL MEDICINE

## 2020-12-16 PROCEDURE — G8753 SYS BP > OR = 140: HCPCS | Performed by: INTERNAL MEDICINE

## 2020-12-16 PROCEDURE — G8510 SCR DEP NEG, NO PLAN REQD: HCPCS | Performed by: INTERNAL MEDICINE

## 2020-12-16 PROCEDURE — G8536 NO DOC ELDER MAL SCRN: HCPCS | Performed by: INTERNAL MEDICINE

## 2020-12-16 PROCEDURE — 1100F PTFALLS ASSESS-DOCD GE2>/YR: CPT | Performed by: INTERNAL MEDICINE

## 2020-12-16 PROCEDURE — G8754 DIAS BP LESS 90: HCPCS | Performed by: INTERNAL MEDICINE

## 2020-12-16 PROCEDURE — 99205 OFFICE O/P NEW HI 60 MIN: CPT | Performed by: INTERNAL MEDICINE

## 2020-12-16 PROCEDURE — 3288F FALL RISK ASSESSMENT DOCD: CPT | Performed by: INTERNAL MEDICINE

## 2020-12-16 PROCEDURE — G8419 CALC BMI OUT NRM PARAM NOF/U: HCPCS | Performed by: INTERNAL MEDICINE

## 2020-12-16 PROCEDURE — G8427 DOCREV CUR MEDS BY ELIG CLIN: HCPCS | Performed by: INTERNAL MEDICINE

## 2020-12-16 NOTE — PATIENT INSTRUCTIONS
1. I think your hand pain is from erosive osteoarthritis, though given your family history of psoriasis I think it's reasonable to trial Ijeoma Oliver. I'm going to give you 2 months of samples, and we'll touch base again to see how this is helping. 2. Ijeoma Oliver is one pill daily. 3. Labs from [facility]. 4. Xrays from any [facility] radiology. 5. Return in 6-8 weeks.

## 2020-12-16 NOTE — PROGRESS NOTES
REASON FOR VISIT:    is a 67 y.o. female with history of chronic low back pain who is being referred to [address] [place] [address] Rheumatology at the request of Dr. Karina Acosta, regarding a diagnosis of finger pain. HISTORY OF PRESENT ILLNESS    For many years, she has had pain and bony swelling in the fingers. No flaring character to her joint pain, denies redness or soft tissue swelling. Voltaren gel helps a bit now, but short-acting. Doesn't bother with oral NSAIDs as hasn't had any relief from ibuprofen or naproxen. Low back pain stays in the low back, can radiate down legs to the feet. She has longstanding diabetes c/b neuropathy, with numbness in feet and tips of fingers. No personal history of psoriasis, though sister has this. Saw a Rheumatolist at [facility] about 5 years ago, told she had osteoarthritis. Sister has rheumatoid arthritis, gets rituximab infusions. She has had glaucoma and diabetic retinopathy requiring laser treatments. No h/o flaring eye redness or acute visual changes. Dentures irritate gums but no spontaneous ulcers.     REVIEW OF SYSTEMS  Negatives as follows:  [place]:    Denies unexplained persistent fevers, weight change, chronic fatigue  HEAD/EYES:              Denies eye redness, blurry vision or sudden loss of vision, dry eyes, HA, temporal pain  ENT:                            Denies oral/nasal ulcers, recurrent sinus infections, dry mouth  RESPIRATORY:         No pleuritic pain, history of pleural effusions, hemoptysis, exertional dyspnea  CARDIOVASCULAR:             Denies chest pain, history of pericardial effusions  GASTRO:                    Denies heartburn, esophageal dysmotility, abdominal pain, nausea, vomiting, diarrhea, blood in the stool  HEMATOLOGIC:        No easy bruising, purpura, swollen lymph nodes  SKIN:                           Denies alopecia, ulcers, nodules, sun sensitivity, unexplained persistent rash   VASCULAR: Denies edema, cyanosis, raynaud phenomenon  NEUROLOGIC:           +paresthesias in hands and feet Denies specific muscle weakness  PSYCHIATRIC:           No sleep disturbance / snoring, depression, anxiety  MSK:                           +extended stiffness, persistent joint pain      Review of systems is as above and is otherwise negative. ALLERGIES  Codeine and Sulfa (sulfonamide antibiotics)    MEDICATIONS  Current Outpatient Medications   Medication Sig    Ferrous Fumarate 325 mg (106 mg iron) tab Take  by mouth.  meclizine (ANTIVERT) 12.5 mg tablet Take 1 Tab by mouth three (3) times daily as needed for Dizziness.  insulin detemir U-100 (LEVEMIR) 100 unit/mL injection 25 Units by SubCUTAneous route two (2) times a day.  traMADoL (ULTRAM) 50 mg tablet Take 1 Tab by mouth every eight (8) hours as needed for Pain for up to 90 days. Max Daily Amount: 150 mg.    cyanocobalamin (VITAMIN B12) 500 mcg tablet Take 1 Tab by mouth daily.  cholecalciferol (VITAMIN D3) (2,000 UNITS /50 MCG) cap capsule Take 2,000 Units by mouth daily.  gabapentin (NEURONTIN) 300 mg capsule Take 2 Caps by mouth nightly. Max Daily Amount: 600 mg. (Patient taking differently: Take 300 mg by mouth nightly.)    amLODIPine (NORVASC) 5 mg tablet Take 1 Tab by mouth daily.  metFORMIN (GLUCOPHAGE) 1,000 mg tablet Take 1 Tab by mouth two (2) times a day.  ramipriL (ALTACE) 10 mg capsule Take 1 Cap by mouth two (2) times a day.  simvastatin (ZOCOR) 20 mg tablet Take 1 Tab by mouth nightly.  clopidogreL (PLAVIX) 75 mg tab Take 1 Tab by mouth daily.  triamcinolone acetonide (KENALOG) 0.1 % ointment Apply  to affected area two (2) times a day. use thin layer on upper back    Bifidobacterium Infantis (Align) 4 mg cap Take 1 Cap by mouth daily.  famotidine (PEPCID) 40 mg tablet Take 40 mg by mouth daily.  loratadine (Claritin) 10 mg tablet Take 10 mg by mouth two (2) times a day.     insulin aspart U-100 (NovoLOG U-100 Insulin aspart) 100 unit/mL injection by SubCUTAneous route as needed.  pantoprazole (PROTONIX) 40 mg tablet Take 1 Tab by mouth daily.  diclofenac EC (VOLTAREN) 75 mg EC tablet Take 75 mg by mouth daily. No current facility-administered medications for this visit. PAST MEDICAL HISTORY  Past Medical History:   Diagnosis Date    Chronic pain     Diabetes ([address]     Hypertension     Ovarian cyst     Removed Laproscopically       FAMILY HISTORY  Sister has rheumatoid arthritis, father had erosive osteoarthritis. SOCIAL HISTORY  She  reports that she has never smoked. She has never used smokeless tobacco. She reports that she does not drink alcohol or use drugs. Social History     Social History Narrative    [employer]ology school and course of Psychology   now cleans offices part-time, previously also CNA. DATA  Visit Vitals  BP (!) 195/74 (BP 1 Location: Right arm, BP Patient Position: Sitting)   Pulse 77   Temp 97.3 °F (36.3 °C) (Oral)   Resp 16   Ht 5' 5\" (1.651 m)   Wt 151 lb 6.4 oz (68.7 kg)   SpO2 97%   BMI 25.19 kg/m²    Body mass index is 25.19 kg/m². No flowsheet data found. General:  The patient is well developed, well nourished, alert, and in no apparent distress. Eyes: Sclera are anicteric. No conjunctival injection. HEENT:  Oropharynx is clear. No oral ulcers. Adequate salivary pooling. No cervical or supraclavicular lymphadenopathy. Lungs:  Clear to auscultation bilaterally, without wheeze or stridor. Normal respiratory effort. Cor:  Regular rate and rhythm. No murmur rub or gallop. Abdomen: Soft, non-tender, without hepatomegaly or masses. Extremities: No calf tenderness or edema. Warm and well perfused. Skin:  No significant abnormalities. No psoriaform lesions, no tophi. Neuro: +SLR bilaterally, normal gait. No LE muscle wasting, normal reflexes.   Musculoskeletal:    A comprehensive musculoskeletal exam was performed for all joints of each upper and lower extremity and assessed for swelling, tenderness and range of motion. Results are documented as below:  Marked Cleo nodes globally right hand with associated tenderness. No deshawn synovitis currently. Pan-flexion deformities of right hand. Bilateral knee crepitus. No evidence of synovitis in the small joints of the hands, wrists, shoulders, elbows, hips, knees or ankles. Labs:    Imaging:      ASSESSMENT AND PLAN  Ms. Laureen Lerma is a 67 y.o. 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 She has no overt synovitis today but describes ongoing worsening of flexion deformities and worsening function. She has Stage IV CKD for which would avoid methotrexate; will trial Tara Flack (initially 11mg XL daily, though may need to renally dose to 5mg daily if any worsening in GFR). We had a deshawn discussion of the risks of immunosuppression and her guarded prognosis for improvement in pain given her already extensive damage, but she is eager to trial Peck Flack and this is reasonable as a short-term trial. Reviewed continued conservative measures with topical diclofenac for the fingers, arthritis gloves, hot wax. 1. Erosive osteoarthritis  - CHRONIC HEPATITIS PANEL; Future  - QUANTIFERON-TB GOLD PLUS; Future  - C REACTIVE PROTEIN, QT; Standing  - CBC WITH AUTOMATED DIFF; Standing  - METABOLIC PANEL, COMPREHENSIVE; Standing  - SED RATE (ESR); Standing  - XR HAND RT MIN 3 V; Future  - XR HAND LT MIN 3 V; Future  - tofacitinib 11 mg Tb24; Take 11 mg by mouth daily. Indications: rheumatoid arthritis  Dispense: 60 Tab; Refill: 0    2. Ongoing use of possibly toxic medication  - CHRONIC HEPATITIS PANEL; Future  - QUANTIFERON-TB GOLD PLUS; Future  - CBC WITH AUTOMATED DIFF; Standing  - METABOLIC PANEL, COMPREHENSIVE; Standing  - tofacitinib 11 mg Tb24; Take 11 mg by mouth daily. Indications: rheumatoid arthritis  Dispense: 60 Tab; Refill: 0    3.  Lumbar facet arthropathy  -Encouraged PT, trial of TENS, lidocaine patches, f/u with pain management for consideration of facet injections or median branch block. Patient Instructions   1. I think your hand pain is from erosive osteoarthritis, though given your family history of psoriasis I think it's reasonable to trial Lcua Montes. I'm going to give you 2 months of samples, and we'll touch base again to see how this is helping. 2. Luca Montes is one pill daily. 3. Labs from [facility]. 4. Xrays from [facility] radiology. 5. Return in 6-8 weeks. Orders Placed This Encounter    QUANTIFERON-TB GOLD PLUS    XR HAND RT MIN 3 V    XR HAND LT MIN 3 V    CHRONIC HEPATITIS PANEL    C REACTIVE PROTEIN, QT    CBC WITH AUTOMATED DIFF    METABOLIC PANEL, COMPREHENSIVE    SED RATE (ESR)    Ferrous Fumarate 325 mg (106 mg iron) tab    tofacitinib 11 mg Tb24       Medications: I am having Horris Cea start on tofacitinib. I am also having her maintain her famotidine, diclofenac EC, loratadine, insulin aspart U-100, Align, triamcinolone acetonide, insulin detemir U-100, traMADoL, cyanocobalamin, cholecalciferol, gabapentin, amLODIPine, metFORMIN, ramipriL, simvastatin, clopidogreL, pantoprazole, meclizine, and Ferrous Fumarate.     Follow up: 6-8 weeks    Ildefonso Breen MD    Adult Rheumatology   [facility]  A [facility], [address] [place] [address] Road   Phone [phone]  Fax [phone]

## 2020-12-16 NOTE — LETTER
12/16/2020 Patient: Mylene Henderson YOB: 1945 Date of Visit: 12/16/2020 Brett Crump MD 
[address] [address] [address] [employer] Dear Brett Crump MD, Thank you for referring Ms. Mylene Henderson to [facility] for evaluation. My notes for this consultation are attached. If you have questions, please do not hesitate to call me. I look forward to following your patient along with you.  
 
 
Sincerely, 
 
Charissa Lopez MD

## 2020-12-21 LAB
ALBUMIN SERPL-MCNC: 4 G/DL (ref 3.7–4.7)
ALBUMIN/GLOB SERPL: 1.5 {RATIO} (ref 1.2–2.2)
ALP SERPL-CCNC: 105 IU/L (ref 39–117)
ALT SERPL-CCNC: 13 IU/L (ref 0–32)
AST SERPL-CCNC: 19 IU/L (ref 0–40)
BASOPHILS # BLD AUTO: 0.1 X10E3/UL (ref 0–0.2)
BASOPHILS NFR BLD AUTO: 1 %
BILIRUB SERPL-MCNC: <0.2 MG/DL (ref 0–1.2)
BUN SERPL-MCNC: 11 MG/DL (ref 8–27)
BUN/CREAT SERPL: 10 (ref 12–28)
CALCIUM SERPL-MCNC: 9.2 MG/DL (ref 8.7–10.3)
CHLORIDE SERPL-SCNC: 101 MMOL/L (ref 96–106)
CO2 SERPL-SCNC: 24 MMOL/L (ref 20–29)
COMMENT, 144067: NORMAL
CREAT SERPL-MCNC: 1.1 MG/DL (ref 0.57–1)
CRP SERPL-MCNC: 3 MG/L (ref 0–10)
EOSINOPHIL # BLD AUTO: 0.1 X10E3/UL (ref 0–0.4)
EOSINOPHIL NFR BLD AUTO: 1 %
ERYTHROCYTE [DISTWIDTH] IN BLOOD BY AUTOMATED COUNT: 18.4 % (ref 11.7–15.4)
ERYTHROCYTE [SEDIMENTATION RATE] IN BLOOD BY WESTERGREN METHOD: 4 MM/HR (ref 0–40)
GAMMA INTERFERON BACKGROUND BLD IA-ACNC: 0.02 IU/ML
GLOBULIN SER CALC-MCNC: 2.7 G/DL (ref 1.5–4.5)
GLUCOSE SERPL-MCNC: 145 MG/DL (ref 65–99)
HBV CORE AB SERPL QL IA: NEGATIVE
HBV CORE IGM SERPL QL IA: NEGATIVE
HBV E AB SERPL QL IA: NEGATIVE
HBV E AG SERPL QL IA: NEGATIVE
HBV SURFACE AB SER QL: NON REACTIVE
HBV SURFACE AG SERPL QL IA: NEGATIVE
HCT VFR BLD AUTO: 35.4 % (ref 34–46.6)
HCV AB S/CO SERPL IA: <0.1 S/CO RATIO (ref 0–0.9)
HGB BLD-MCNC: 11.1 G/DL (ref 11.1–15.9)
IMM GRANULOCYTES # BLD AUTO: 0 X10E3/UL (ref 0–0.1)
IMM GRANULOCYTES NFR BLD AUTO: 0 %
LYMPHOCYTES # BLD AUTO: 2.9 X10E3/UL (ref 0.7–3.1)
LYMPHOCYTES NFR BLD AUTO: 34 %
M TB IFN-G BLD-IMP: NEGATIVE
M TB IFN-G CD4+ BCKGRND COR BLD-ACNC: 0.02 IU/ML
MCH RBC QN AUTO: 24.9 PG (ref 26.6–33)
MCHC RBC AUTO-ENTMCNC: 31.4 G/DL (ref 31.5–35.7)
MCV RBC AUTO: 79 FL (ref 79–97)
MITOGEN IGNF BLD-ACNC: >10 IU/ML
MONOCYTES # BLD AUTO: 0.6 X10E3/UL (ref 0.1–0.9)
MONOCYTES NFR BLD AUTO: 7 %
NEUTROPHILS # BLD AUTO: 4.9 X10E3/UL (ref 1.4–7)
NEUTROPHILS NFR BLD AUTO: 57 %
PLATELET # BLD AUTO: 316 X10E3/UL (ref 150–450)
POTASSIUM SERPL-SCNC: 4.4 MMOL/L (ref 3.5–5.2)
PROT SERPL-MCNC: 6.7 G/DL (ref 6–8.5)
QUANTIFERON INCUBATION, QF1T: NORMAL
QUANTIFERON TB2 AG: 0.02 IU/ML
RBC # BLD AUTO: 4.46 X10E6/UL (ref 3.77–5.28)
SERVICE CMNT-IMP: NORMAL
SODIUM SERPL-SCNC: 139 MMOL/L (ref 134–144)
WBC # BLD AUTO: 8.6 X10E3/UL (ref 3.4–10.8)

## 2020-12-26 ENCOUNTER — PATIENT MESSAGE (OUTPATIENT)
Dept: FAMILY MEDICINE CLINIC | Age: 75
End: 2020-12-26

## 2020-12-26 DIAGNOSIS — E11.22 TYPE 2 DIABETES MELLITUS WITH STAGE 2 CHRONIC KIDNEY DISEASE, WITH LONG-TERM CURRENT USE OF INSULIN (HCC): Primary | Chronic | ICD-10-CM

## 2020-12-26 DIAGNOSIS — N18.2 TYPE 2 DIABETES MELLITUS WITH STAGE 2 CHRONIC KIDNEY DISEASE, WITH LONG-TERM CURRENT USE OF INSULIN (HCC): Primary | Chronic | ICD-10-CM

## 2020-12-26 DIAGNOSIS — Z79.4 TYPE 2 DIABETES MELLITUS WITH STAGE 2 CHRONIC KIDNEY DISEASE, WITH LONG-TERM CURRENT USE OF INSULIN (HCC): Primary | Chronic | ICD-10-CM

## 2020-12-26 DIAGNOSIS — H81.10 BENIGN PAROXYSMAL POSITIONAL VERTIGO, UNSPECIFIED LATERALITY: ICD-10-CM

## 2020-12-28 DIAGNOSIS — I10 ESSENTIAL HYPERTENSION: Chronic | ICD-10-CM

## 2020-12-28 DIAGNOSIS — M79.2 NEUROPATHIC PAIN: ICD-10-CM

## 2020-12-28 DIAGNOSIS — H81.10 BENIGN PAROXYSMAL POSITIONAL VERTIGO, UNSPECIFIED LATERALITY: ICD-10-CM

## 2020-12-28 RX ORDER — MECLIZINE HCL 12.5 MG 12.5 MG/1
12.5 TABLET ORAL
Qty: 60 TAB | Refills: 1 | Status: SHIPPED | OUTPATIENT
Start: 2020-12-28 | End: 2020-12-28 | Stop reason: SDUPTHER

## 2020-12-28 RX ORDER — SIMVASTATIN 20 MG/1
20 TABLET, FILM COATED ORAL
Qty: 90 TAB | Refills: 1 | Status: SHIPPED | OUTPATIENT
Start: 2020-12-28 | End: 2021-04-15 | Stop reason: SDUPTHER

## 2020-12-28 RX ORDER — AMLODIPINE BESYLATE 5 MG/1
5 TABLET ORAL DAILY
Qty: 90 TAB | Refills: 1 | Status: SHIPPED | OUTPATIENT
Start: 2020-12-28 | End: 2021-04-15 | Stop reason: SDUPTHER

## 2020-12-28 RX ORDER — GABAPENTIN 300 MG/1
600 CAPSULE ORAL
Qty: 180 CAP | Refills: 2 | Status: SHIPPED | OUTPATIENT
Start: 2020-12-28 | End: 2021-04-15 | Stop reason: SDUPTHER

## 2020-12-28 RX ORDER — BLOOD SUGAR DIAGNOSTIC
STRIP MISCELLANEOUS
Qty: 200 STRIP | Refills: 3 | Status: SHIPPED | OUTPATIENT
Start: 2020-12-28 | End: 2021-10-13

## 2020-12-28 RX ORDER — CLOPIDOGREL BISULFATE 75 MG/1
75 TABLET ORAL DAILY
Qty: 90 TAB | Refills: 1 | Status: SHIPPED | OUTPATIENT
Start: 2020-12-28 | End: 2021-04-15 | Stop reason: SDUPTHER

## 2020-12-28 RX ORDER — METFORMIN HYDROCHLORIDE 1000 MG/1
1000 TABLET ORAL 2 TIMES DAILY
Qty: 180 TAB | Refills: 1 | Status: SHIPPED | OUTPATIENT
Start: 2020-12-28 | End: 2021-04-15 | Stop reason: SDUPTHER

## 2020-12-28 RX ORDER — PANTOPRAZOLE SODIUM 40 MG/1
40 TABLET, DELAYED RELEASE ORAL DAILY
Qty: 90 TAB | Refills: 1 | Status: SHIPPED | OUTPATIENT
Start: 2020-12-28 | End: 2021-04-15 | Stop reason: SDUPTHER

## 2020-12-28 RX ORDER — RAMIPRIL 10 MG/1
10 CAPSULE ORAL 2 TIMES DAILY
Qty: 180 CAP | Refills: 1 | Status: SHIPPED | OUTPATIENT
Start: 2020-12-28 | End: 2021-04-15 | Stop reason: SDUPTHER

## 2020-12-28 RX ORDER — TRAMADOL HYDROCHLORIDE 50 MG/1
50 TABLET ORAL
Qty: 270 TAB | Refills: 0 | Status: SHIPPED | OUTPATIENT
Start: 2020-12-28 | End: 2021-04-15 | Stop reason: SDUPTHER

## 2020-12-28 RX ORDER — MECLIZINE HCL 12.5 MG 12.5 MG/1
12.5 TABLET ORAL
Qty: 270 TAB | Refills: 1 | Status: SHIPPED | OUTPATIENT
Start: 2020-12-28 | End: 2021-01-12 | Stop reason: SDUPTHER

## 2020-12-28 NOTE — TELEPHONE ENCOUNTER
From: Moustapha Parker  To: Gabrielle Teague MD  Sent: 12/26/2020 2:10 PM EST  Subject: Janak Potter... I hope you had a Kim Brianna!!    Can you please submit a new script to the [facility] mail order for a 90 day supply for the meclizine 12.5 mg tablet for mom, they seem to be helping a lot. Also wanted to know if you could send a script for her test strips as well. She tests usually 3-4 times per day and uses the Contour Next strips.     Thank you so much!!

## 2021-01-12 ENCOUNTER — PATIENT MESSAGE (OUTPATIENT)
Dept: FAMILY MEDICINE CLINIC | Age: 76
End: 2021-01-12

## 2021-01-12 DIAGNOSIS — H81.10 BENIGN PAROXYSMAL POSITIONAL VERTIGO, UNSPECIFIED LATERALITY: ICD-10-CM

## 2021-01-12 RX ORDER — MECLIZINE HCL 12.5 MG 12.5 MG/1
12.5 TABLET ORAL
Qty: 90 TAB | Refills: 0 | Status: SHIPPED | OUTPATIENT
Start: 2021-01-12 | End: 2021-04-15 | Stop reason: SDUPTHER

## 2021-01-12 NOTE — TELEPHONE ENCOUNTER
From: García Gama  To: Mercedes Byrnes MD  Sent: 1/12/2021 1:11 PM EST  Subject: Prescription Question    Good Afternoon Dr. Romulo Hsu. .. I just got off the phone with Mom's mail order pharmacy and I have a few things for you. 1. Can you please send a 30 day script for the Meclizine to [facility] in [facility] right away. They have her 80 day script under review, they have appvd an emerg script to be called in for us to  as she has been out of it for a week now. 2. Can you please call them for the pre-auth for her test strips because the contour next is not a normal strip they just pay for without pre-auth. They asked that you call [phone].  3. Can you change her next Levimir script to a 90 day w/ 5 vials they only send 4 when she actually needs 4.5 vials.     Thank you so much for all you do!! You are VERY appreciated Sir!!

## 2021-01-29 ENCOUNTER — OFFICE VISIT (OUTPATIENT)
Dept: RHEUMATOLOGY | Age: 76
End: 2021-01-29
Payer: MEDICARE

## 2021-01-29 VITALS
DIASTOLIC BLOOD PRESSURE: 70 MMHG | WEIGHT: 154.8 LBS | OXYGEN SATURATION: 98 % | TEMPERATURE: 96.9 F | HEART RATE: 86 BPM | SYSTOLIC BLOOD PRESSURE: 161 MMHG | RESPIRATION RATE: 18 BRPM | BODY MASS INDEX: 25.79 KG/M2 | HEIGHT: 65 IN

## 2021-01-29 DIAGNOSIS — M06.00 SERONEGATIVE RHEUMATOID ARTHRITIS (HCC): ICD-10-CM

## 2021-01-29 DIAGNOSIS — Z78.0 ENCOUNTER FOR OSTEOPOROSIS SCREENING IN ASYMPTOMATIC POSTMENOPAUSAL PATIENT: Primary | ICD-10-CM

## 2021-01-29 DIAGNOSIS — M15.4 EROSIVE OSTEOARTHRITIS OF BOTH HANDS: ICD-10-CM

## 2021-01-29 DIAGNOSIS — M85.80 OSTEOPENIA WITH HIGH RISK OF FRACTURE: ICD-10-CM

## 2021-01-29 DIAGNOSIS — Z13.820 ENCOUNTER FOR OSTEOPOROSIS SCREENING IN ASYMPTOMATIC POSTMENOPAUSAL PATIENT: Primary | ICD-10-CM

## 2021-01-29 PROCEDURE — 99215 OFFICE O/P EST HI 40 MIN: CPT | Performed by: INTERNAL MEDICINE

## 2021-01-29 PROCEDURE — G8419 CALC BMI OUT NRM PARAM NOF/U: HCPCS | Performed by: INTERNAL MEDICINE

## 2021-01-29 PROCEDURE — G8427 DOCREV CUR MEDS BY ELIG CLIN: HCPCS | Performed by: INTERNAL MEDICINE

## 2021-01-29 PROCEDURE — 1090F PRES/ABSN URINE INCON ASSESS: CPT | Performed by: INTERNAL MEDICINE

## 2021-01-29 PROCEDURE — G8536 NO DOC ELDER MAL SCRN: HCPCS | Performed by: INTERNAL MEDICINE

## 2021-01-29 PROCEDURE — G8754 DIAS BP LESS 90: HCPCS | Performed by: INTERNAL MEDICINE

## 2021-01-29 PROCEDURE — 3288F FALL RISK ASSESSMENT DOCD: CPT | Performed by: INTERNAL MEDICINE

## 2021-01-29 PROCEDURE — G8400 PT W/DXA NO RESULTS DOC: HCPCS | Performed by: INTERNAL MEDICINE

## 2021-01-29 PROCEDURE — G8753 SYS BP > OR = 140: HCPCS | Performed by: INTERNAL MEDICINE

## 2021-01-29 PROCEDURE — 1100F PTFALLS ASSESS-DOCD GE2>/YR: CPT | Performed by: INTERNAL MEDICINE

## 2021-01-29 PROCEDURE — G8510 SCR DEP NEG, NO PLAN REQD: HCPCS | Performed by: INTERNAL MEDICINE

## 2021-01-29 NOTE — LETTER
1/31/2021 Patient: Scott Almanza YOB: 1945 Date of Visit: 1/29/2021 Aleena Hoyos MD 
[address] [address] [address] [employer] Dear Aleena Hoyos MD, Thank you for referring Ms. Sctot Almanza to [facility] for evaluation. My notes for this consultation are attached. If you have questions, please do not hesitate to call me. I look forward to following your patient along with you.  
 
 
Sincerely, 
 
Finesse Sampson MD

## 2021-01-29 NOTE — PROGRESS NOTES
Chief Complaint   Patient presents with    Arthritis     fingers, back     1. Have you been to the ER, urgent care clinic since your last visit? Hospitalized since your last visit? No    2. Have you seen or consulted any other health care providers outside of the [address] [place] [address] since your last visit? Include any pap smears or colon screening.  No

## 2021-01-29 NOTE — PROGRESS NOTES
REASON FOR VISIT:    is a 67 y.o. 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 HISTORY OF PRESENT ILLNESS    Last visit 6wk ago, had started tofacitinib. Tolerating well, feels substantial improvement in hand pain and stiffness. No appreciable worsening of her previously progressive digital tightening. No GI upset or abdominal pain. No interval infections. Still low back pain, stays paralumbar. Last DXA was 15 years, remembers bone density was \"awesome\". Suspects she has lost about an inch in height since. No recent falls. No extended prednisone. REVIEW OF SYSTEMS  Negatives as follows:  Lennis Jointer:    Denies unexplained persistent fevers, weight change, chronic fatigue  HEAD/EYES:              Denies eye redness, blurry vision or sudden loss of vision, dry eyes, HA, temporal pain  ENT:                            Denies oral/nasal ulcers, recurrent sinus infections, dry mouth  RESPIRATORY:         No pleuritic pain, history of pleural effusions, hemoptysis, exertional dyspnea  CARDIOVASCULAR:             Denies chest pain, history of pericardial effusions  GASTRO:                    Denies heartburn, esophageal dysmotility, abdominal pain, nausea, vomiting, diarrhea, blood in the stool  HEMATOLOGIC:        No easy bruising, purpura, swollen lymph nodes  SKIN:                           Denies alopecia, ulcers, nodules, sun sensitivity, unexplained persistent rash   VASCULAR:                Denies edema, cyanosis, raynaud phenomenon  NEUROLOGIC:           +paresthesias in hands and feet Denies specific muscle weakness  PSYCHIATRIC:           No sleep disturbance / snoring, depression, anxiety  MSK:                           +extended stiffness, persistent joint pain      Review of systems is as above and is otherwise negative.     ALLERGIES  Codeine and Sulfa (sulfonamide antibiotics)    MEDICATIONS  Current Outpatient Medications   Medication Sig    meclizine (ANTIVERT) 12.5 mg tablet Take 1 Tab by mouth three (3) times daily as needed for Dizziness.  insulin detemir U-100 (LEVEMIR) 100 unit/mL injection 25 Units by SubCUTAneous route two (2) times a day.  traMADoL (ULTRAM) 50 mg tablet Take 1 Tab by mouth every eight (8) hours as needed for Pain for up to 90 days. Max Daily Amount: 150 mg.    gabapentin (NEURONTIN) 300 mg capsule Take 2 Caps by mouth nightly. Max Daily Amount: 600 mg.    amLODIPine (NORVASC) 5 mg tablet Take 1 Tab by mouth daily.  metFORMIN (GLUCOPHAGE) 1,000 mg tablet Take 1 Tab by mouth two (2) times a day.  ramipriL (ALTACE) 10 mg capsule Take 1 Cap by mouth two (2) times a day.  simvastatin (ZOCOR) 20 mg tablet Take 1 Tab by mouth nightly.  clopidogreL (PLAVIX) 75 mg tab Take 1 Tab by mouth daily.  pantoprazole (PROTONIX) 40 mg tablet Take 1 Tab by mouth daily.  glucose blood VI test strips (Contour Next Test Strips) strip Check BG up to 4 times daily. Dx E11.9    Ferrous Fumarate 325 mg (106 mg iron) tab Take  by mouth.  tofacitinib 11 mg Tb24 Take 11 mg by mouth daily. Indications: rheumatoid arthritis    cyanocobalamin (VITAMIN B12) 500 mcg tablet Take 1 Tab by mouth daily.  cholecalciferol (VITAMIN D3) (2,000 UNITS /50 MCG) cap capsule Take 2,000 Units by mouth daily.  triamcinolone acetonide (KENALOG) 0.1 % ointment Apply  to affected area two (2) times a day. use thin layer on upper back    famotidine (PEPCID) 40 mg tablet Take 40 mg by mouth daily.  diclofenac EC (VOLTAREN) 75 mg EC tablet Take 75 mg by mouth daily.  insulin aspart U-100 (NovoLOG U-100 Insulin aspart) 100 unit/mL injection by SubCUTAneous route as needed.  Bifidobacterium Infantis (Align) 4 mg cap Take 1 Cap by mouth daily.  loratadine (Claritin) 10 mg tablet Take 10 mg by mouth two (2) times a day.      No current facility-administered medications for this visit. PAST MEDICAL HISTORY  Past Medical History:   Diagnosis Date    Chronic pain     Diabetes ([address]     Hypertension     Ovarian cyst     Removed Laproscopically       FAMILY HISTORY  Sister has rheumatoid arthritis, father had erosive osteoarthritis. SOCIAL HISTORY  She  reports that she has never smoked. She has never used smokeless tobacco. She reports that she does not drink alcohol or use drugs. Social History     Social History Narrative    Cosmetology school and course of Psychology   now cleans offices part-time, previously also CNA. DATA  Visit Vitals  BP (!) 161/70 (BP 1 Location: Left upper arm, BP Patient Position: Sitting)   Pulse 86   Temp 96.9 °F (36.1 °C) (Oral)   Resp 18   Ht 5' 5\" (1.651 m)   Wt 154 lb 12.8 oz (70.2 kg)   SpO2 98%   BMI 25.76 kg/m²    Body mass index is 25.76 kg/m². No flowsheet data found. General:  The patient is well developed, well nourished, alert, and in no apparent distress. Eyes: Sclera are anicteric. No conjunctival injection. HEENT:  Oropharynx is clear. No oral ulcers. Adequate salivary pooling. No cervical or supraclavicular lymphadenopathy. Lungs:  Clear to auscultation bilaterally, without wheeze or stridor. Normal respiratory effort. Cor:  Regular rate and rhythm. No murmur rub or gallop. Abdomen: Soft, non-tender, without hepatomegaly or masses. Extremities: No calf tenderness or edema. Warm and well perfused. Skin:  No significant abnormalities. No psoriaform lesions, no tophi. Neuro: +SLR bilaterally, normal gait. No LE muscle wasting, normal reflexes. Musculoskeletal:    A comprehensive musculoskeletal exam was performed for all joints of each upper and lower extremity and assessed for swelling, tenderness and range of motion. Results are documented as below:  Marked Cleo nodes globally right hand with associated tenderness. No deshawn synovitis currently.  Pan-flexion deformities of right hand; index PIP fused, ~15deg flexion in other PIPs. Bilateral knee crepitus. No evidence of synovitis in the small joints of the hands, wrists, shoulders, elbows, hips, knees or ankles. Labs:  20: CBC WNL, Cr 1.1, Hep B cAb neg, sAb neg, sAg neg; Hep C Ab neg; QFN gold neg  20:     Imagin17 XR Lspine (report only): Impression: Plain radiographs demonstrate 100% loss of disc height at L3-4, L4-5, and L5-S1 with resultant foraminal stenosis. ASSESSMENT AND PLAN  Ms. Chidi Miles is a 67 y.o. 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 She endorses substantial improvement in pain and function since starting [employer], arguing for an underlying component of seronegative rheumatoid arthritis. She has Stage IV CKD for which would continue to avoid methotrexate; now applying for [employer] continuation for seronegative RA through her insurance given marked improvement over the last 6 weeks. 1. Encounter for osteoporosis screening in asymptomatic postmenopausal patient  - DEXA BONE DENSITY STUDY AXIAL; Future    2. Osteopenia with high risk of fracture  - DEXA BONE DENSITY STUDY AXIAL; Future    3. Seronegative rheumatoid arthritis (HCC)  - C REACTIVE PROTEIN, QT; Future  - CBC WITH AUTOMATED DIFF; Future  - METABOLIC PANEL, COMPREHENSIVE; Future  - SED RATE (ESR); Future  - LIPID PANEL; Future  - tofacitinib 11 mg Tb24; Take 11 mg by mouth daily. Indications: rheumatoid arthritis  Dispense: 30 Tab; Refill: 0  - tofacitinib 11 mg Tb24; Take 11 mg by mouth daily. Indications: rheumatoid arthritis  Dispense: 30 Tab; Refill: 0    4. Erosive osteoarthritis of both hands  - C REACTIVE PROTEIN, QT; Future  - CBC WITH AUTOMATED DIFF; Future  - METABOLIC PANEL, COMPREHENSIVE; Future  - SED RATE (ESR); Future  - LIPID PANEL; Future       Patient Instructions   1. Xrays and bone density at your convenience.  You may need to schedule the DXA scan ahead of time. Call [phone] to schedule    2. Labs in 1 month, any [facility] (or [facility] labs can send [facility] labs)    3. Message if you have any problems with infections, rashes, new joint concerns. 4. Tylenol 650mg up to 3 times per day is safe and may help take the edge off.    5. Return in 3 months. Orders Placed This Encounter    DEXA BONE DENSITY STUDY AXIAL    C REACTIVE PROTEIN, QT    CBC WITH AUTOMATED DIFF    METABOLIC PANEL, COMPREHENSIVE    SED RATE (ESR)    LIPID PANEL    tofacitinib 11 mg Tb24    tofacitinib 11 mg Tb24       Medications: I am having Clinton Jara start on tofacitinib and tofacitinib. I am also having her maintain her famotidine, diclofenac EC, loratadine, insulin aspart U-100, Align, triamcinolone acetonide, cyanocobalamin, cholecalciferol, Ferrous Fumarate, tofacitinib, insulin detemir U-100, traMADoL, gabapentin, amLODIPine, metFORMIN, ramipriL, simvastatin, clopidogreL, pantoprazole, Contour Next Test Strips, and meclizine.     Follow up: 3 months    Mame Callaway MD    Adult Rheumatology   General acute hospital  A Part of [facility], [address]   Phone [phone]  Fax [phone]

## 2021-01-29 NOTE — PATIENT INSTRUCTIONS
1. Xrays and bone density at your convenience. You may need to schedule the DXA scan ahead of time. Call [phone] to schedule    2. Labs in 1 month, any [facility] (or [employer] labs can send [facility] labs)    3. Message if you have any problems with infections, rashes, new joint concerns. 4. Tylenol 650mg up to 3 times per day is safe and may help take the edge off.    5. Return in 3 months.

## 2021-02-02 ENCOUNTER — DOCUMENTATION ONLY (OUTPATIENT)
Dept: PHARMACY | Age: 76
End: 2021-02-02

## 2021-02-02 NOTE — PROGRESS NOTES
[facility] at [address] [facility] Update    Date: 02/02/21    Mary Pantoja 1945    Medication: Noah Dubs XR 11 mg    Prior Authorization: through 12/31/2021    Co-pay $6960.37     Patient has a high co-pay on their prescription. Pharmacy staff contacted the patient and advised them to call the [facility] at [phone] to discuss potentially obtaining the medication through the  directly or other treatment options, if appropriate. Pharmacist answered any questions that the patient had.     Ismael Gibson, [address] at [facility],  [place]   [place], [address]  phone: [phone]   fax: [phone]

## 2021-02-09 ENCOUNTER — HOSPITAL ENCOUNTER (OUTPATIENT)
Dept: MAMMOGRAPHY | Age: 76
Discharge: HOME | End: 2021-02-09
Attending: INTERNAL MEDICINE
Payer: MEDICARE

## 2021-02-09 ENCOUNTER — HOSPITAL ENCOUNTER (OUTPATIENT)
Dept: GENERAL RADIOLOGY | Age: 76
Discharge: HOME | End: 2021-02-09
Attending: INTERNAL MEDICINE
Payer: MEDICARE

## 2021-02-09 DIAGNOSIS — Z13.820 ENCOUNTER FOR OSTEOPOROSIS SCREENING IN ASYMPTOMATIC POSTMENOPAUSAL PATIENT: ICD-10-CM

## 2021-02-09 DIAGNOSIS — Z78.0 ENCOUNTER FOR OSTEOPOROSIS SCREENING IN ASYMPTOMATIC POSTMENOPAUSAL PATIENT: ICD-10-CM

## 2021-02-09 DIAGNOSIS — M15.4 EROSIVE OSTEOARTHRITIS: ICD-10-CM

## 2021-02-09 PROCEDURE — 77080 DXA BONE DENSITY AXIAL: CPT

## 2021-02-09 PROCEDURE — 73130 X-RAY EXAM OF HAND: CPT

## 2021-03-17 ENCOUNTER — DOCUMENTATION ONLY (OUTPATIENT)
Dept: RHEUMATOLOGY | Age: 76
End: 2021-03-17

## 2021-03-17 NOTE — PROGRESS NOTES
Faxed [employer] Patient Assistance paperwork for DR. Williamson, and the patient's form for Xeljanz XR to [employer].

## 2021-04-15 ENCOUNTER — OFFICE VISIT (OUTPATIENT)
Dept: FAMILY MEDICINE CLINIC | Age: 76
End: 2021-04-15
Payer: MEDICARE

## 2021-04-15 VITALS
BODY MASS INDEX: 26.66 KG/M2 | WEIGHT: 160 LBS | RESPIRATION RATE: 18 BRPM | SYSTOLIC BLOOD PRESSURE: 156 MMHG | TEMPERATURE: 97 F | OXYGEN SATURATION: 99 % | HEIGHT: 65 IN | HEART RATE: 66 BPM | DIASTOLIC BLOOD PRESSURE: 70 MMHG

## 2021-04-15 DIAGNOSIS — E78.2 MIXED HYPERLIPIDEMIA: ICD-10-CM

## 2021-04-15 DIAGNOSIS — G89.29 OTHER CHRONIC PAIN: Chronic | ICD-10-CM

## 2021-04-15 DIAGNOSIS — M79.2 NEUROPATHIC PAIN: ICD-10-CM

## 2021-04-15 DIAGNOSIS — Z86.73 HX OF TRANSIENT ISCHEMIC ATTACK (TIA): Chronic | ICD-10-CM

## 2021-04-15 DIAGNOSIS — I10 ESSENTIAL HYPERTENSION: Primary | Chronic | ICD-10-CM

## 2021-04-15 DIAGNOSIS — E11.40 TYPE 2 DIABETES MELLITUS WITH DIABETIC NEUROPATHY, WITHOUT LONG-TERM CURRENT USE OF INSULIN (HCC): ICD-10-CM

## 2021-04-15 PROBLEM — G45.9 TIA (TRANSIENT ISCHEMIC ATTACK): Chronic | Status: RESOLVED | Noted: 2020-06-08 | Resolved: 2021-04-15

## 2021-04-15 PROCEDURE — 1090F PRES/ABSN URINE INCON ASSESS: CPT | Performed by: FAMILY MEDICINE

## 2021-04-15 PROCEDURE — G8536 NO DOC ELDER MAL SCRN: HCPCS | Performed by: FAMILY MEDICINE

## 2021-04-15 PROCEDURE — G8754 DIAS BP LESS 90: HCPCS | Performed by: FAMILY MEDICINE

## 2021-04-15 PROCEDURE — 1100F PTFALLS ASSESS-DOCD GE2>/YR: CPT | Performed by: FAMILY MEDICINE

## 2021-04-15 PROCEDURE — 3288F FALL RISK ASSESSMENT DOCD: CPT | Performed by: FAMILY MEDICINE

## 2021-04-15 PROCEDURE — G8427 DOCREV CUR MEDS BY ELIG CLIN: HCPCS | Performed by: FAMILY MEDICINE

## 2021-04-15 PROCEDURE — G8399 PT W/DXA RESULTS DOCUMENT: HCPCS | Performed by: FAMILY MEDICINE

## 2021-04-15 PROCEDURE — 99214 OFFICE O/P EST MOD 30 MIN: CPT | Performed by: FAMILY MEDICINE

## 2021-04-15 PROCEDURE — G8419 CALC BMI OUT NRM PARAM NOF/U: HCPCS | Performed by: FAMILY MEDICINE

## 2021-04-15 PROCEDURE — G8510 SCR DEP NEG, NO PLAN REQD: HCPCS | Performed by: FAMILY MEDICINE

## 2021-04-15 PROCEDURE — G8753 SYS BP > OR = 140: HCPCS | Performed by: FAMILY MEDICINE

## 2021-04-15 RX ORDER — TRAMADOL HYDROCHLORIDE 50 MG/1
50 TABLET ORAL
Qty: 270 TAB | Refills: 0 | Status: SHIPPED | OUTPATIENT
Start: 2021-04-15 | End: 2021-07-14 | Stop reason: SDUPTHER

## 2021-04-15 NOTE — PROGRESS NOTES
[facility], [place]    History of Present Illness:   Megan Smith is a 67 y.o. female with history of HTN, TIA, DM, Chronic Pain  CC: Follow up Chronic COnditions  History provided by patient and Records    HPI:  Hypertension Follow up:  Currently Taking Amlodipine 5 mg daily, Ramipril 10 mg. The patient reports:  taking medications as instructed, no medication side effects noted, no TIA's, no chest pain on exertion, no dyspnea on exertion, no swelling of ankles, no orthostatic dizziness or lightheadedness, no orthopnea or paroxysmal nocturnal dyspnea. Reports Home BP has been better controlled though not checked recently. BP Readings from Last 3 Encounters:   04/15/21 (!) 156/70   01/29/21 (!) 161/70   12/16/20 (!) 195/74      Diabetes Follow up: Overall the patient feels well with good energy level. Current Medications:   Key Antihyperglycemic Medications             insulin detemir U-100 (LEVEMIR) 100 unit/mL injection (Taking) 25 Units by SubCUTAneous route two (2) times a day. metFORMIN (GLUCOPHAGE) 1,000 mg tablet (Taking) Take 1 Tab by mouth two (2) times a day. insulin aspart U-100 (NovoLOG U-100 Insulin aspart) 100 unit/mL injection (Taking) by SubCUTAneous route as needed. .   Insulin dependence: YES   Frequency of home glucose testing: Daily   Blood Sugar range at home: 100-170    Last eye exam: In past 12 months. Last foot exam: This year. Polyuria, polyphagia or polydipsia: NO   Retinopathy: NO   Neuropathy SX: NO   Low blood sugar symptoms: NO   Dietary compliance: compliant most of the time   Medication compliance: compliant most of the time   On ASA: NO   Tobacco Use: NO   Depression: NO     Wt Readings from Last 3 Encounters:   04/15/21 160 lb (72.6 kg)   01/29/21 154 lb 12.8 oz (70.2 kg)   12/16/20 151 lb 6.4 oz (68.7 kg)     Hypertriglyceridemia Follow up:   Cardiovascular risks for her are: diabetic  hypertension  hyperlipidemia.    Currently she takes Zocor (simvastatin) , 20 mg. Myalgias: NO   Fatigue: NO   Other side effects: NO     Wt Readings from Last 3 Encounters:   04/15/21 160 lb (72.6 kg)   01/29/21 154 lb 12.8 oz (70.2 kg)   12/16/20 151 lb 6.4 oz (68.7 kg)     Hx of TIA: Taking Plavix, no new symtpoms    Health Maintenance  Health Maintenance Due   Topic Date Due    Eye Exam Retinal or Dilated  Never done    COVID-19 Vaccine (1) Never done    DTaP/Tdap/Td series (1 - Tdap) Never done    Pneumococcal 65+ years (1 of 1 - PPSV23) Never done    Medicare Yearly Exam  Never done       Past Medical, Family, and Social History:     Current Outpatient Medications on File Prior to Visit   Medication Sig Dispense Refill    tofacitinib 11 mg Tb24 Take 11 mg by mouth daily. Indications: rheumatoid arthritis 30 Tab 11    meclizine (ANTIVERT) 12.5 mg tablet Take 1 Tab by mouth three (3) times daily as needed for Dizziness. (Patient taking differently: Take 12.5 mg by mouth nightly.) 90 Tab 0    insulin detemir U-100 (LEVEMIR) 100 unit/mL injection 25 Units by SubCUTAneous route two (2) times a day. 45 mL 1    gabapentin (NEURONTIN) 300 mg capsule Take 2 Caps by mouth nightly. Max Daily Amount: 600 mg. 180 Cap 2    amLODIPine (NORVASC) 5 mg tablet Take 1 Tab by mouth daily. 90 Tab 1    metFORMIN (GLUCOPHAGE) 1,000 mg tablet Take 1 Tab by mouth two (2) times a day. 180 Tab 1    ramipriL (ALTACE) 10 mg capsule Take 1 Cap by mouth two (2) times a day. 180 Cap 1    simvastatin (ZOCOR) 20 mg tablet Take 1 Tab by mouth nightly. 90 Tab 1    clopidogreL (PLAVIX) 75 mg tab Take 1 Tab by mouth daily. 90 Tab 1    pantoprazole (PROTONIX) 40 mg tablet Take 1 Tab by mouth daily. 90 Tab 1    Ferrous Fumarate 325 mg (106 mg iron) tab Take  by mouth.  cyanocobalamin (VITAMIN B12) 500 mcg tablet Take 1 Tab by mouth daily. 90 Tab 1    cholecalciferol (VITAMIN D3) (2,000 UNITS /50 MCG) cap capsule Take 2,000 Units by mouth daily.  90 Cap 1    triamcinolone acetonide (KENALOG) 0.1 % ointment Apply  to affected area two (2) times a day. use thin layer on upper back (Patient taking differently: Apply  to affected area as needed. use thin layer on upper back) 80 g 1    famotidine (PEPCID) 40 mg tablet Take 40 mg by mouth daily.  insulin aspart U-100 (NovoLOG U-100 Insulin aspart) 100 unit/mL injection by SubCUTAneous route as needed.  tofacitinib 11 mg Tb24 Take 11 mg by mouth daily. Indications: rheumatoid arthritis 30 Tab 0    tofacitinib 11 mg Tb24 Take 11 mg by mouth daily. Indications: rheumatoid arthritis 30 Tab 0    glucose blood VI test strips (Contour Next Test Strips) strip Check BG up to 4 times daily. Dx E11.9 200 Strip 3    [DISCONTINUED] traMADoL (ULTRAM) 50 mg tablet Take 1 Tab by mouth every eight (8) hours as needed for Pain for up to 90 days. Max Daily Amount: 150 mg. 270 Tab 0    tofacitinib 11 mg Tb24 Take 11 mg by mouth daily. Indications: rheumatoid arthritis 60 Tab 0    Bifidobacterium Infantis (Align) 4 mg cap Take 1 Cap by mouth daily. 30 Cap 1    diclofenac EC (VOLTAREN) 75 mg EC tablet Take 75 mg by mouth daily.  loratadine (Claritin) 10 mg tablet Take 10 mg by mouth two (2) times a day. No current facility-administered medications on file prior to visit.         Patient Active Problem List   Diagnosis Code    Chronic pain G89.29    Type 2 diabetes mellitus with stage 2 chronic kidney disease, with long-term current use of insulin ([facility]) E11.22, N18.2, Z79.4    Essential hypertension I10    Type 2 diabetes mellitus with diabetic neuropathy ([facility] E11.40    Hx of transient ischemic attack (TIA) Z86.73       Social History     Socioeconomic History    Marital status:      Spouse name: Not on file    Number of children: Not on file    Years of education: Not on file    Highest education level: Not on file   Occupational History    Not on file   Social Needs    Financial resource strain: Not on file   [employer] insecurity     Worry: Not on file     Inability: Not on file    Transportation needs     Medical: Not on file     Non-medical: Not on file   Tobacco Use    Smoking status: Never Smoker    Smokeless tobacco: Never Used   Substance and Sexual Activity    Alcohol use: Never     Frequency: Never    Drug use: Never    Sexual activity: Not on file   Lifestyle    Physical activity     Days per week: Not on file     Minutes per session: Not on file    Stress: Not on file   Relationships    Social connections     Talks on phone: Not on file     Gets together: Not on file     Attends [employer] service: Not on file     Active member of club or organization: Not on file     Attends meetings of clubs or organizations: Not on file     Relationship status: Not on file    Intimate partner violence     Fear of current or ex partner: Not on file     Emotionally abused: Not on file     Physically abused: Not on file     Forced sexual activity: Not on file   Other Topics Concern    Not on file   Social History Narrative    Cosmotology school and course of Psychology       Review of Systems   Review of Systems   Constitutional: Negative for chills and fever. HENT: Negative for congestion. Respiratory: Negative for cough and shortness of breath. Cardiovascular: Negative for chest pain and palpitations. Gastrointestinal: Negative for abdominal pain, nausea and vomiting. Neurological: Negative for headaches. Objective:     Visit Vitals  BP (!) 156/70   Pulse 66   Temp 97 °F (36.1 °C) (Oral)   Resp 18   Ht 5' 5\" (1.651 m)   Wt 160 lb (72.6 kg)   SpO2 99%   BMI 26.63 kg/m²        Physical Exam  Vitals signs and nursing note reviewed. Constitutional:       Appearance: Normal appearance. HENT:      Head: Normocephalic and atraumatic. Neck:      Musculoskeletal: Normal range of motion and neck supple. Cardiovascular:      Rate and Rhythm: Normal rate and regular rhythm. Pulses: Normal pulses.       Heart sounds: Normal heart sounds. No murmur. No friction rub. No gallop. Pulmonary:      Effort: Pulmonary effort is normal.      Breath sounds: Normal breath sounds. Abdominal:      General: Abdomen is flat. Bowel sounds are normal.      Palpations: Abdomen is soft. Musculoskeletal: Normal range of motion. Skin:     General: Skin is warm and dry. Neurological:      Mental Status: She is alert. Pertinent Labs/Studies:      Assessment and orders:       ICD-10-CM ICD-9-CM    1. Essential hypertension  I10 401.9 CBC W/O DIFF      METABOLIC PANEL, COMPREHENSIVE   2. Neuropathic pain  M79.2 729.2 traMADoL (ULTRAM) 50 mg tablet   3. Type 2 diabetes mellitus with diabetic neuropathy, without long-term current use of insulin ([facility])  E11.40 250.60 HEMOGLOBIN A1C WITH EAG     357.2    4. Other chronic pain  G89.29 338.29 COMPLIANCE DRUG SCREEN/PRESCRIPTION MONITORING      DRUG SCREEN, URINE   5. Hx of transient ischemic attack (TIA)  Z86.73 V12.54    6. Mixed hyperlipidemia  E78.2 272.2 LIPID PANEL     Diagnoses and all orders for this visit:    1. Essential hypertension:Our goal is to normalize the blood pressure to decrease the risks of strokes and heart attacks. The patient is in agreement with the plan. To monitor at home and follow up. -     CBC W/O DIFF; Future  -     METABOLIC PANEL, COMPREHENSIVE; Future    2. Neuropathic pain: Refill medication  -     traMADoL (ULTRAM) 50 mg tablet; Take 1 Tab by mouth every eight (8) hours as needed for Pain for up to 90 days. Max Daily Amount: 150 mg.    3. Type 2 diabetes mellitus with diabetic neuropathy, without long-term current use of insulin [facility]): Discussed with patient today that the goal for their diabetes is to have a HgA1C<7 and ideally as close to 6.5 as possible. We discussed diet and medications. The goal for the cholesterol LDL is less than 70 and HDL>40. Patient is aware of the need for yearly eye exams and to take care of their feet daily. Discussed with patient that blood pressure should be less than 130/80 and watching salt intake is very important.    -     HEMOGLOBIN A1C WITH EAG; Future    4. Other chronic pain  -     COMPLIANCE DRUG SCREEN/PRESCRIPTION MONITORING; Future  -     DRUG SCREEN, URINE; Future    5. Hx of transient ischemic attack (TIA): Condition and symptoms are well controlled at this time. No changes to therapy at this time. 6. Mixed hyperlipidemia: The patient is aware of our goal to reduce or eliminate the long term problems (such as strokes and heart attacks) related to poorly controlled Triglycerides, LDL, Cholesterol.   -     LIPID PANEL; Future      Follow-up and Dispositions    · Return in about 3 months (around 7/15/2021). I have discussed the diagnosis with the patient and the intended plan as seen in the above orders. Social history, medical history, and labs were reviewed. The patient has received an after-visit summary and questions were answered concerning future plans. I have discussed medication side effects and warnings with the patient as well.     MD HOUSTON Magallon & ALONDRA WOODS [facility]  04/15/21

## 2021-04-15 NOTE — PROGRESS NOTES
Chief Complaint   Patient presents with    Follow Up Chronic Condition     Visit Vitals  BP (!) 167/75 (BP 1 Location: Right arm, BP Patient Position: Sitting, BP Cuff Size: Adult)   Pulse 66   Temp 97 °F (36.1 °C) (Oral)   Resp 18   Ht 5' 5\" (1.651 m)   Wt 160 lb (72.6 kg)   SpO2 99%   BMI 26.63 kg/m²     1. Have you been to the ER, urgent care clinic since your last visit? Hospitalized since your last visit? No    2. Have you seen or consulted any other health care providers outside of the [address] [place] [address] since your last visit? Include any pap smears or colon screening.  No    Reviewed record in preparation for visit and have necessary documentation  Pt did not bring medication to office visit for review  opportunity was given for questions  Goals that were addressed and/or need to be completed during or after this appointment include   Health Maintenance Due   Topic Date Due    Eye Exam Retinal or Dilated  Never done    COVID-19 Vaccine (1) Never done    DTaP/Tdap/Td series (1 - Tdap) Never done    Pneumococcal 65+ years (1 of 1 - PPSV23) Never done    Medicare Yearly Exam  Never done

## 2021-04-21 LAB
ALBUMIN SERPL-MCNC: 3.7 G/DL (ref 3.5–5)
ALBUMIN/GLOB SERPL: 1.1 {RATIO} (ref 1.1–2.2)
ALP SERPL-CCNC: 90 U/L (ref 45–117)
ALT SERPL-CCNC: 35 U/L (ref 12–78)
AMPHET UR QL SCN: NEGATIVE
ANION GAP SERPL CALC-SCNC: 5 MMOL/L (ref 5–15)
AST SERPL-CCNC: 25 U/L (ref 15–37)
BARBITURATES UR QL SCN: NEGATIVE
BENZODIAZ UR QL: NEGATIVE
BILIRUB SERPL-MCNC: 0.3 MG/DL (ref 0.2–1)
BUN SERPL-MCNC: 13 MG/DL (ref 6–20)
BUN/CREAT SERPL: 9 (ref 12–20)
CALCIUM SERPL-MCNC: 9.1 MG/DL (ref 8.5–10.1)
CANNABINOIDS UR QL SCN: NEGATIVE
CHLORIDE SERPL-SCNC: 104 MMOL/L (ref 97–108)
CHOLEST SERPL-MCNC: 197 MG/DL
CO2 SERPL-SCNC: 29 MMOL/L (ref 21–32)
COCAINE UR QL SCN: NEGATIVE
CREAT SERPL-MCNC: 1.38 MG/DL (ref 0.55–1.02)
DRUG SCRN COMMENT,DRGCM: NORMAL
ERYTHROCYTE [DISTWIDTH] IN BLOOD BY AUTOMATED COUNT: 17.8 % (ref 11.5–14.5)
EST. AVERAGE GLUCOSE BLD GHB EST-MCNC: 177 MG/DL
GLOBULIN SER CALC-MCNC: 3.3 G/DL (ref 2–4)
GLUCOSE SERPL-MCNC: 172 MG/DL (ref 65–100)
HBA1C MFR BLD: 7.8 % (ref 4–5.6)
HCT VFR BLD AUTO: 34.6 % (ref 35–47)
HDLC SERPL-MCNC: 53 MG/DL
HDLC SERPL: 3.7 {RATIO} (ref 0–5)
HGB BLD-MCNC: 10.5 G/DL (ref 11.5–16)
LDLC SERPL CALC-MCNC: 103.6 MG/DL (ref 0–100)
LIPID PROFILE,FLP: ABNORMAL
MCH RBC QN AUTO: 28.5 PG (ref 26–34)
MCHC RBC AUTO-ENTMCNC: 30.3 G/DL (ref 30–36.5)
MCV RBC AUTO: 94 FL (ref 80–99)
METHADONE UR QL: NEGATIVE
NRBC # BLD: 0 K/UL (ref 0–0.01)
NRBC BLD-RTO: 0 PER 100 WBC
OPIATES UR QL: NEGATIVE
PCP UR QL: NEGATIVE
PLATELET # BLD AUTO: 300 K/UL (ref 150–400)
PMV BLD AUTO: 10.7 FL (ref 8.9–12.9)
POTASSIUM SERPL-SCNC: 4.8 MMOL/L (ref 3.5–5.1)
PROT SERPL-MCNC: 7 G/DL (ref 6.4–8.2)
RBC # BLD AUTO: 3.68 M/UL (ref 3.8–5.2)
SODIUM SERPL-SCNC: 138 MMOL/L (ref 136–145)
TRIGL SERPL-MCNC: 202 MG/DL (ref ?–150)
VLDLC SERPL CALC-MCNC: 40.4 MG/DL
WBC # BLD AUTO: 5.9 K/UL (ref 3.6–11)

## 2021-04-27 ENCOUNTER — DOCUMENTATION ONLY (OUTPATIENT)
Dept: RHEUMATOLOGY | Age: 76
End: 2021-04-27

## 2021-04-27 LAB — DRUGS UR: NORMAL

## 2021-04-27 NOTE — PROGRESS NOTES
Patient Assistance through [employer] approved for free Raymon Ke until 12/31/2021. [id number]

## 2021-04-29 ENCOUNTER — OFFICE VISIT (OUTPATIENT)
Dept: RHEUMATOLOGY | Age: 76
End: 2021-04-29
Payer: MEDICARE

## 2021-04-29 VITALS
WEIGHT: 156.8 LBS | HEART RATE: 67 BPM | OXYGEN SATURATION: 96 % | TEMPERATURE: 98.4 F | BODY MASS INDEX: 26.12 KG/M2 | HEIGHT: 65 IN | DIASTOLIC BLOOD PRESSURE: 72 MMHG | SYSTOLIC BLOOD PRESSURE: 178 MMHG | RESPIRATION RATE: 16 BRPM

## 2021-04-29 DIAGNOSIS — M81.0 AGE-RELATED OSTEOPOROSIS WITHOUT CURRENT PATHOLOGICAL FRACTURE: ICD-10-CM

## 2021-04-29 DIAGNOSIS — M06.00 SERONEGATIVE RHEUMATOID ARTHRITIS (HCC): Primary | ICD-10-CM

## 2021-04-29 DIAGNOSIS — Z79.899 ONGOING USE OF POSSIBLY TOXIC MEDICATION: ICD-10-CM

## 2021-04-29 PROCEDURE — 1100F PTFALLS ASSESS-DOCD GE2>/YR: CPT | Performed by: INTERNAL MEDICINE

## 2021-04-29 PROCEDURE — 3288F FALL RISK ASSESSMENT DOCD: CPT | Performed by: INTERNAL MEDICINE

## 2021-04-29 PROCEDURE — G8419 CALC BMI OUT NRM PARAM NOF/U: HCPCS | Performed by: INTERNAL MEDICINE

## 2021-04-29 PROCEDURE — G8427 DOCREV CUR MEDS BY ELIG CLIN: HCPCS | Performed by: INTERNAL MEDICINE

## 2021-04-29 PROCEDURE — 99215 OFFICE O/P EST HI 40 MIN: CPT | Performed by: INTERNAL MEDICINE

## 2021-04-29 PROCEDURE — G8754 DIAS BP LESS 90: HCPCS | Performed by: INTERNAL MEDICINE

## 2021-04-29 PROCEDURE — G8536 NO DOC ELDER MAL SCRN: HCPCS | Performed by: INTERNAL MEDICINE

## 2021-04-29 PROCEDURE — G8753 SYS BP > OR = 140: HCPCS | Performed by: INTERNAL MEDICINE

## 2021-04-29 PROCEDURE — 1090F PRES/ABSN URINE INCON ASSESS: CPT | Performed by: INTERNAL MEDICINE

## 2021-04-29 PROCEDURE — G8510 SCR DEP NEG, NO PLAN REQD: HCPCS | Performed by: INTERNAL MEDICINE

## 2021-04-29 NOTE — PATIENT INSTRUCTIONS
1. Your joints look great, continue the Cleta Day daily. 2. Repeat labs before your Prolia injection in July/August to ensure no problems with the Cleta Day. 3. I'm ordering you Prolia, which you can start once you get back from [place]. Let me know if you or your daughter have any questions. 4. Return in 3-4 months.
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
## 2021-04-29 NOTE — Clinical Note
Can you get us started with Prolia paperwork, 60mg every 6 months for age-related osteoporosis without fracture? Thank you!

## 2021-04-29 NOTE — PROGRESS NOTES
Chief Complaint   Patient presents with    Arthritis     fingers     1. Have you been to the ER, urgent care clinic since your last visit? Hospitalized since your last visit? No    2. Have you seen or consulted any other health care providers outside of the [address] [place] [address] since your last visit? Include any pap smears or colon screening.  Yes Where: PCP,Dr. Moises Estevez

## 2021-07-14 ENCOUNTER — TELEPHONE (OUTPATIENT)
Dept: FAMILY MEDICINE CLINIC | Age: 76
End: 2021-07-14

## 2021-07-14 DIAGNOSIS — H81.10 BENIGN PAROXYSMAL POSITIONAL VERTIGO, UNSPECIFIED LATERALITY: ICD-10-CM

## 2021-07-14 DIAGNOSIS — I10 ESSENTIAL HYPERTENSION: Chronic | ICD-10-CM

## 2021-07-14 DIAGNOSIS — M79.2 NEUROPATHIC PAIN: ICD-10-CM

## 2021-07-15 LAB
ALBUMIN SERPL-MCNC: 4 G/DL (ref 3.7–4.7)
ALBUMIN/GLOB SERPL: 1.5 {RATIO} (ref 1.2–2.2)
ALP SERPL-CCNC: 90 IU/L (ref 48–121)
ALT SERPL-CCNC: 23 IU/L (ref 0–32)
AST SERPL-CCNC: 22 IU/L (ref 0–40)
BASOPHILS # BLD AUTO: 0 X10E3/UL (ref 0–0.2)
BASOPHILS NFR BLD AUTO: 1 %
BILIRUB SERPL-MCNC: 0.2 MG/DL (ref 0–1.2)
BUN SERPL-MCNC: 12 MG/DL (ref 8–27)
BUN/CREAT SERPL: 10 (ref 12–28)
CALCIUM SERPL-MCNC: 9.4 MG/DL (ref 8.7–10.3)
CHLORIDE SERPL-SCNC: 101 MMOL/L (ref 96–106)
CO2 SERPL-SCNC: 25 MMOL/L (ref 20–29)
CREAT SERPL-MCNC: 1.2 MG/DL (ref 0.57–1)
CRP SERPL-MCNC: 16 MG/L (ref 0–10)
EOSINOPHIL # BLD AUTO: 0.1 X10E3/UL (ref 0–0.4)
EOSINOPHIL NFR BLD AUTO: 1 %
ERYTHROCYTE [DISTWIDTH] IN BLOOD BY AUTOMATED COUNT: 14.5 % (ref 11.7–15.4)
ERYTHROCYTE [SEDIMENTATION RATE] IN BLOOD BY WESTERGREN METHOD: 21 MM/HR (ref 0–40)
GLOBULIN SER CALC-MCNC: 2.7 G/DL (ref 1.5–4.5)
GLUCOSE SERPL-MCNC: 155 MG/DL (ref 65–99)
HCT VFR BLD AUTO: 35.4 % (ref 34–46.6)
HGB BLD-MCNC: 11.3 G/DL (ref 11.1–15.9)
IMM GRANULOCYTES # BLD AUTO: 0 X10E3/UL (ref 0–0.1)
IMM GRANULOCYTES NFR BLD AUTO: 1 %
LYMPHOCYTES # BLD AUTO: 0.9 X10E3/UL (ref 0.7–3.1)
LYMPHOCYTES NFR BLD AUTO: 14 %
MCH RBC QN AUTO: 28.3 PG (ref 26.6–33)
MCHC RBC AUTO-ENTMCNC: 31.9 G/DL (ref 31.5–35.7)
MCV RBC AUTO: 89 FL (ref 79–97)
MONOCYTES # BLD AUTO: 0.5 X10E3/UL (ref 0.1–0.9)
MONOCYTES NFR BLD AUTO: 7 %
NEUTROPHILS # BLD AUTO: 4.9 X10E3/UL (ref 1.4–7)
NEUTROPHILS NFR BLD AUTO: 76 %
PLATELET # BLD AUTO: 311 X10E3/UL (ref 150–450)
POTASSIUM SERPL-SCNC: 4.8 MMOL/L (ref 3.5–5.2)
PROT SERPL-MCNC: 6.7 G/DL (ref 6–8.5)
PTH-INTACT SERPL-MCNC: 55 PG/ML (ref 15–65)
RBC # BLD AUTO: 3.99 X10E6/UL (ref 3.77–5.28)
SODIUM SERPL-SCNC: 138 MMOL/L (ref 134–144)
TSH SERPL DL<=0.005 MIU/L-ACNC: 2.16 UIU/ML (ref 0.45–4.5)
WBC # BLD AUTO: 6.4 X10E3/UL (ref 3.4–10.8)

## 2021-07-15 RX ORDER — TRAMADOL HYDROCHLORIDE 50 MG/1
50 TABLET ORAL
Qty: 270 TABLET | Refills: 0 | Status: SHIPPED | OUTPATIENT
Start: 2021-07-15 | End: 2021-08-05 | Stop reason: SDUPTHER

## 2021-07-15 RX ORDER — METFORMIN HYDROCHLORIDE 1000 MG/1
1000 TABLET ORAL 2 TIMES DAILY
Qty: 180 TABLET | Refills: 1 | Status: SHIPPED | OUTPATIENT
Start: 2021-07-15 | End: 2021-08-05 | Stop reason: SDUPTHER

## 2021-07-15 RX ORDER — RAMIPRIL 10 MG/1
10 CAPSULE ORAL 2 TIMES DAILY
Qty: 180 CAPSULE | Refills: 1 | Status: SHIPPED | OUTPATIENT
Start: 2021-07-15 | End: 2021-07-28 | Stop reason: SDUPTHER

## 2021-07-15 RX ORDER — PANTOPRAZOLE SODIUM 40 MG/1
40 TABLET, DELAYED RELEASE ORAL DAILY
Qty: 90 TABLET | Refills: 1 | Status: SHIPPED | OUTPATIENT
Start: 2021-07-15 | End: 2021-08-05 | Stop reason: SDUPTHER

## 2021-07-15 RX ORDER — CLOPIDOGREL BISULFATE 75 MG/1
75 TABLET ORAL DAILY
Qty: 90 TABLET | Refills: 1 | Status: SHIPPED | OUTPATIENT
Start: 2021-07-15 | End: 2021-08-05 | Stop reason: SDUPTHER

## 2021-07-15 RX ORDER — AMLODIPINE BESYLATE 5 MG/1
5 TABLET ORAL DAILY
Qty: 90 TABLET | Refills: 1 | Status: SHIPPED | OUTPATIENT
Start: 2021-07-15 | End: 2021-08-05 | Stop reason: SDUPTHER

## 2021-07-15 RX ORDER — SIMVASTATIN 20 MG/1
20 TABLET, FILM COATED ORAL
Qty: 90 TABLET | Refills: 1 | Status: SHIPPED | OUTPATIENT
Start: 2021-07-15 | End: 2021-08-05 | Stop reason: SDUPTHER

## 2021-07-15 RX ORDER — GABAPENTIN 300 MG/1
600 CAPSULE ORAL
Qty: 180 CAPSULE | Refills: 0 | Status: SHIPPED | OUTPATIENT
Start: 2021-07-15 | End: 2021-08-05 | Stop reason: SDUPTHER

## 2021-07-15 RX ORDER — MECLIZINE HCL 12.5 MG 12.5 MG/1
12.5 TABLET ORAL
Qty: 90 TABLET | Refills: 1 | Status: SHIPPED | OUTPATIENT
Start: 2021-07-15 | End: 2021-08-05 | Stop reason: SDUPTHER

## 2021-07-19 DIAGNOSIS — M81.0 OSTEOPOROSIS, UNSPECIFIED OSTEOPOROSIS TYPE, UNSPECIFIED PATHOLOGICAL FRACTURE PRESENCE: Primary | ICD-10-CM

## 2021-07-19 RX ORDER — DIPHENHYDRAMINE HYDROCHLORIDE 50 MG/ML
25 INJECTION, SOLUTION INTRAMUSCULAR; INTRAVENOUS AS NEEDED
Status: CANCELLED
Start: 2021-07-19

## 2021-07-19 RX ORDER — ALBUTEROL SULFATE 0.83 MG/ML
2.5 SOLUTION RESPIRATORY (INHALATION) AS NEEDED
Status: CANCELLED
Start: 2021-07-19

## 2021-07-19 RX ORDER — DIPHENHYDRAMINE HYDROCHLORIDE 50 MG/ML
50 INJECTION, SOLUTION INTRAMUSCULAR; INTRAVENOUS AS NEEDED
Status: CANCELLED
Start: 2021-07-19

## 2021-07-19 RX ORDER — EPINEPHRINE 1 MG/ML
0.3 INJECTION, SOLUTION, CONCENTRATE INTRAVENOUS AS NEEDED
Status: CANCELLED | OUTPATIENT
Start: 2021-07-19

## 2021-07-19 RX ORDER — ACETAMINOPHEN 325 MG/1
650 TABLET ORAL AS NEEDED
Status: CANCELLED
Start: 2021-07-19

## 2021-07-19 RX ORDER — HYDROCORTISONE SODIUM SUCCINATE 100 MG/2ML
100 INJECTION, POWDER, FOR SOLUTION INTRAMUSCULAR; INTRAVENOUS AS NEEDED
Status: CANCELLED | OUTPATIENT
Start: 2021-07-19

## 2021-07-19 RX ORDER — ONDANSETRON 2 MG/ML
8 INJECTION INTRAMUSCULAR; INTRAVENOUS AS NEEDED
Status: CANCELLED | OUTPATIENT
Start: 2021-07-19

## 2021-07-19 NOTE — PROGRESS NOTES
[address] [facility] Rheumatology  OBIC Note    Date: 2021    Rheumatology OBIC COVID-19 SCREENING  The patient was asked the following questions and answered as documented below:    1. Do you have any symptoms of COVID-19? SOB, coughing, fever, or generally not feeling well? NO  2. Have you been exposed to COVID-19 recently? NO  3. Have you had any recent contact with family/friend that has a pending COVID test? NO    Name: Roberto Zepeda  MRN: [id number]       : 1945  Diagnosis: Osteoporosis   Treatment: Prolia    Patient arrived to [facility] at 8:00a. Ms. Daria Levy allergies reviewed and she agreed to receiving today's treatment. A physical assessment was performed initially and post-treatment. Pt. denies new complaints today. C/o chronic finger joint pain. Visit Vitals  BP (!) 173/53   Pulse 76   Temp 97.9 °F (36.6 °C)   Resp 16   SpO2 95%        Relevant labs results:   2021 13:26   WBC 6.4   RBC 3.99   HGB 11.3   HCT 35.4   MCV 89   MCH 28.3   MCHC 31.9   RDW 14.5   PLATELET 873   NEUTROPHILS 76   Lymphocytes 14   MONOCYTES 7   EOSINOPHILS 1   BASOPHILS 1   IMMATURE GRANULOCYTES 1   ABS. NEUTROPHILS 4.9   ABS. IMM. GRANS. 0.0   Abs Lymphocytes 0.9   ABS. MONOCYTES 0.5   ABS. EOSINOPHILS 0.1   ABS. BASOPHILS 0.0   Sed rate (ESR) 21   Sodium 138   Potassium 4.8   Chloride 101   CO2 25   Glucose 155 (H)   BUN 12   Creatinine 1.20 (H)   BUN/Creatinine ratio 10 (L)   Calcium 9.4   GFR est non-AA 44 (L)   GFR est AA 51 (L)   Bilirubin, total 0.2   Protein, total 6.7   Albumin 4.0   A-G Ratio 1.5   ALT 23   AST 22   Alk.  phosphatase 90   TSH 2.160   PTH INTACT Rpt   C-Reactive Protein, Qt 16 (H)   C REACTIVE PROTEIN, QT Rpt (A)     Medications given per providers orders:  Administrations This Visit     denosumab (PROLIA) injection 60 mg     Admin Date  2021 Action  Given Dose  60 mg Route  SubCUTAneous Administered By  Florian Riojas RN              Prolia  [address] [id number]  Lot # [id number]  Exp 08/2023      Ms. Hobsb tolerated the treatment without complaints and there was no change to initial physical assessment. No medication reactions seen while in presence of this RN. Pt declined discharge paperwork  All of patients questions were answered. Ms. Nick Dick was discharged ambulatory from Rheumatology OBIC in stable condition at 0815.      Future Appointments   Date Time Provider Gregorio Holbrook   1/24/2022  8:00 AM INFUSIONINJ_RC AOCR BS AMB       Yane Cruz RN  July 21, 2021  8:30a

## 2021-07-19 NOTE — PATIENT INSTRUCTIONS
Denosumab (By injection)   Denosumab ([id number]-[id number])  Treats osteoporosis, bone cancer, hypercalcemia, and other bone problems in patients who have cancer. Brand Name(s): Prolia, Xgeva   There may be other brand names for this medicine. When This Medicine Should Not Be Used: This medicine is not right for everyone. You should not receive it if you had an allergic reaction to denosumab or if you are pregnant. How to Use This Medicine:   Injectable  · A doctor or other health professional will give you this medicine. This medicine is usually given as a shot under the skin of your upper arm, upper thigh, or stomach. · This medicine should come with a Medication Guide. Ask your pharmacist for a copy if you do not have one. · Missed dose: Call your doctor or pharmacist for instructions. Drugs and Foods to Avoid:   Ask your doctor or pharmacist before using any other medicine, including over-the-counter medicines, vitamins, and herbal products. · Do not use Prolia® and Xgeva® together. They contain the same medicine. · Some medicines can affect how denosumab works. Tell your doctor if you are also using medicine that weakens your immune system, including a steroid or cancer medicine. Warnings While Using This Medicine:   · This medicine may cause birth defects if either partner is using it during conception or pregnancy. Tell your doctor right away if you or your partner becomes pregnant. Use an effective form of birth control. Women who are being treated with Luanne Mitts should continue using birth control for at least 5 months after the last dose. · Tell your doctor if you are breastfeeding, or if you have kidney disease, diabetes, gum disease, or an allergy to latex. Tell your doctor if you have problems with your thyroid, parathyroid, or digestive system.   · This medicine may cause the following problems:  ¨ Low calcium levels in your blood  ¨ Increased risk of broken thigh bone  ¨ Increased risk of infections  ¨ Serious skin reactions  ¨ Severe bone, joint, or muscle pain  · This medicine can cause jaw problems. You must have regular dental exams while you are being treated with this medicine. Tell your dentist that you are using this medicine. Practice good oral hygiene. · Do not suddenly stop using Prolia® without checking first with your doctor. Doing so may increase risk for more fractures. Talk to your doctor about other medicine that you can take. · Your doctor will do lab tests at regular visits to check on the effects of this medicine. Keep all appointments. Possible Side Effects While Using This Medicine:   Call your doctor right away if you notice any of these side effects:  · Allergic reaction: Itching or hives, swelling in your face or hands, swelling or tingling in your mouth or throat, chest tightness, trouble breathing  · Blistering, peeling, red skin rash  · Chest pain, fast or uneven heartbeat, trouble breathing  · Fever, chills, cough, sore throat, body aches  · Lightheadedness, dizziness, fainting  · Muscle spasms or twitching, numbness or tingling in your fingers, toes, or lips  · Pain or burning during urination, change in how much or how often you urinate  · Pain, swelling, heavy feeling, or numbness in your mouth or jaw, loose teeth or other teeth problems  · Severe bone, joint, or muscle pain  · Unusual pain in your thigh, groin, or hip  If you notice these less serious side effects, talk with your doctor:   · Diarrhea, nausea  · Redness, pain, itching, burning, swelling, or a lump under your skin where the shot was given  · Tiredness or weakness  If you notice other side effects that you think are caused by this medicine, tell your doctor. Call your doctor for medical advice about side effects.  You may report side effects to FDA at 9-576-IYH-2665  © 2017 [facility] Information is for End User's use only and may not be sold, redistributed or otherwise used for commercial purposes. The above information is an  only. It is not intended as medical advice for individual conditions or treatments. Talk to your doctor, nurse or pharmacist before following any medical regimen to see if it is safe and effective for you.

## 2021-07-21 ENCOUNTER — APPOINTMENT (OUTPATIENT)
Dept: INFUSION THERAPY | Age: 76
End: 2021-07-21

## 2021-07-21 ENCOUNTER — OFFICE VISIT (OUTPATIENT)
Dept: RHEUMATOLOGY | Age: 76
End: 2021-07-21
Payer: MEDICARE

## 2021-07-21 VITALS
TEMPERATURE: 97.9 F | OXYGEN SATURATION: 95 % | SYSTOLIC BLOOD PRESSURE: 173 MMHG | RESPIRATION RATE: 16 BRPM | DIASTOLIC BLOOD PRESSURE: 53 MMHG | HEART RATE: 76 BPM

## 2021-07-21 DIAGNOSIS — M81.0 OSTEOPOROSIS, UNSPECIFIED OSTEOPOROSIS TYPE, UNSPECIFIED PATHOLOGICAL FRACTURE PRESENCE: Primary | ICD-10-CM

## 2021-07-21 PROCEDURE — 96372 THER/PROPH/DIAG INJ SC/IM: CPT

## 2021-07-21 RX ORDER — ONDANSETRON 2 MG/ML
8 INJECTION INTRAMUSCULAR; INTRAVENOUS AS NEEDED
Status: CANCELLED | OUTPATIENT
Start: 2022-01-19

## 2021-07-21 RX ORDER — DIPHENHYDRAMINE HYDROCHLORIDE 50 MG/ML
25 INJECTION, SOLUTION INTRAMUSCULAR; INTRAVENOUS AS NEEDED
Status: CANCELLED
Start: 2022-01-19

## 2021-07-21 RX ORDER — EPINEPHRINE 1 MG/ML
0.3 INJECTION, SOLUTION, CONCENTRATE INTRAVENOUS AS NEEDED
Status: CANCELLED | OUTPATIENT
Start: 2022-01-19

## 2021-07-21 RX ORDER — HYDROCORTISONE SODIUM SUCCINATE 100 MG/2ML
100 INJECTION, POWDER, FOR SOLUTION INTRAMUSCULAR; INTRAVENOUS AS NEEDED
Status: CANCELLED | OUTPATIENT
Start: 2022-01-19

## 2021-07-21 RX ORDER — ACETAMINOPHEN 325 MG/1
650 TABLET ORAL AS NEEDED
Status: CANCELLED
Start: 2022-01-19

## 2021-07-21 RX ORDER — DIPHENHYDRAMINE HYDROCHLORIDE 50 MG/ML
50 INJECTION, SOLUTION INTRAMUSCULAR; INTRAVENOUS AS NEEDED
Status: CANCELLED
Start: 2022-01-19

## 2021-07-21 RX ORDER — ALBUTEROL SULFATE 0.83 MG/ML
2.5 SOLUTION RESPIRATORY (INHALATION) AS NEEDED
Status: CANCELLED
Start: 2022-01-19

## 2021-07-28 ENCOUNTER — PATIENT MESSAGE (OUTPATIENT)
Dept: FAMILY MEDICINE CLINIC | Age: 76
End: 2021-07-28

## 2021-07-28 RX ORDER — RAMIPRIL 10 MG/1
10 CAPSULE ORAL 2 TIMES DAILY
Qty: 180 CAPSULE | Refills: 1 | Status: SHIPPED | OUTPATIENT
Start: 2021-07-28 | End: 2021-07-28 | Stop reason: SDUPTHER

## 2021-07-28 RX ORDER — RAMIPRIL 10 MG/1
10 CAPSULE ORAL 2 TIMES DAILY
Qty: 180 CAPSULE | Refills: 1 | Status: SHIPPED | OUTPATIENT
Start: 2021-07-28 | End: 2021-08-05 | Stop reason: SDUPTHER

## 2021-07-28 NOTE — TELEPHONE ENCOUNTER
Pt daughter sent this this morning to you but needs this today. Good morning Dr. Yandy Montiel. ..     Mom's mail order sent all of her scripts [employer] on the 19th and they have not been delivered yet. She is completely out of Ramapril and almost out of her Levemir. I called them and they put an override in for these and said to have you put a script in at the [facility] for them so we can pick them up today. Can you please do this for us for 90 day supplies so she can go pick them up today? If you have any questions please give me a call. ..   [phone].     Thank you so much!!

## 2021-07-28 NOTE — TELEPHONE ENCOUNTER
From: [place] [facility]  To: Didier Michelle MD  Sent: 7/28/2021 9:52 AM EDT  Subject: Prescription Question    Good morning Dr. Johann Shone. .. Mom's mail order sent all of her scripts [employer] on the 19th and they have not been delivered yet. She is completely out of Ramapril and almost out of her Levemir. I called them and they put an override in for these and said to have you put a script in at the [facility] for them so we can pick them up today. Can you please do this for us for 90 day supplies so she can go pick them up today? If you have any questions please give me a call. .. [phone].     Thank you so much!!

## 2021-08-02 ENCOUNTER — PATIENT MESSAGE (OUTPATIENT)
Dept: FAMILY MEDICINE CLINIC | Age: 76
End: 2021-08-02

## 2021-08-02 ENCOUNTER — TELEPHONE (OUTPATIENT)
Dept: FAMILY MEDICINE CLINIC | Age: 76
End: 2021-08-02

## 2021-08-02 DIAGNOSIS — Z79.4 TYPE 2 DIABETES MELLITUS WITH STAGE 2 CHRONIC KIDNEY DISEASE, WITH LONG-TERM CURRENT USE OF INSULIN (HCC): Primary | ICD-10-CM

## 2021-08-02 DIAGNOSIS — N18.2 TYPE 2 DIABETES MELLITUS WITH STAGE 2 CHRONIC KIDNEY DISEASE, WITH LONG-TERM CURRENT USE OF INSULIN (HCC): Primary | ICD-10-CM

## 2021-08-02 DIAGNOSIS — E11.22 TYPE 2 DIABETES MELLITUS WITH STAGE 2 CHRONIC KIDNEY DISEASE, WITH LONG-TERM CURRENT USE OF INSULIN (HCC): Primary | ICD-10-CM

## 2021-08-02 NOTE — TELEPHONE ENCOUNTER
Refill Called In.     MD HOUSTON Wiseman & ALONDRA WOODS [facility]  08/02/21

## 2021-08-05 DIAGNOSIS — R19.7 DIARRHEA, UNSPECIFIED TYPE: ICD-10-CM

## 2021-08-05 DIAGNOSIS — E11.22 TYPE 2 DIABETES MELLITUS WITH STAGE 2 CHRONIC KIDNEY DISEASE, WITH LONG-TERM CURRENT USE OF INSULIN (HCC): ICD-10-CM

## 2021-08-05 DIAGNOSIS — H81.10 BENIGN PAROXYSMAL POSITIONAL VERTIGO, UNSPECIFIED LATERALITY: ICD-10-CM

## 2021-08-05 DIAGNOSIS — M79.2 NEUROPATHIC PAIN: ICD-10-CM

## 2021-08-05 DIAGNOSIS — Z79.4 TYPE 2 DIABETES MELLITUS WITH STAGE 2 CHRONIC KIDNEY DISEASE, WITH LONG-TERM CURRENT USE OF INSULIN (HCC): ICD-10-CM

## 2021-08-05 DIAGNOSIS — N18.2 TYPE 2 DIABETES MELLITUS WITH STAGE 2 CHRONIC KIDNEY DISEASE, WITH LONG-TERM CURRENT USE OF INSULIN (HCC): ICD-10-CM

## 2021-08-05 DIAGNOSIS — I10 ESSENTIAL HYPERTENSION: Chronic | ICD-10-CM

## 2021-08-05 NOTE — TELEPHONE ENCOUNTER
----- Message from [facility] sent at 8/5/2021  1:50 PM EDT -----  Regarding: Dr. Trae Blackburn  General Message/Vendor Calls    Caller's first and last name: Sheba Weiner, daughter      Reason for call: Pt has not received medications from [facility] yet, as they were lost in shipping, and caller is wondering if the provider can call in all of the pt's medications for [facility] again please. Pt is out of most everything.  Supposedly [facility] sent a request to the provider, but will not send anything out until new Rx is sent    Callback required yes/no and why: Yes      Best contact number(s): [phone]      Details to clarify the request: [id number]

## 2021-08-06 RX ORDER — METFORMIN HYDROCHLORIDE 1000 MG/1
1000 TABLET ORAL 2 TIMES DAILY
Qty: 60 TABLET | Refills: 0 | Status: SHIPPED | OUTPATIENT
Start: 2021-08-06 | End: 2021-08-12 | Stop reason: SDUPTHER

## 2021-08-06 RX ORDER — SIMVASTATIN 20 MG/1
20 TABLET, FILM COATED ORAL
Qty: 30 TABLET | Refills: 0 | Status: SHIPPED | OUTPATIENT
Start: 2021-08-06 | End: 2021-08-12 | Stop reason: SDUPTHER

## 2021-08-06 RX ORDER — GABAPENTIN 300 MG/1
600 CAPSULE ORAL
Qty: 60 CAPSULE | Refills: 0 | Status: SHIPPED | OUTPATIENT
Start: 2021-08-06 | End: 2021-08-12 | Stop reason: SDUPTHER

## 2021-08-06 RX ORDER — PANTOPRAZOLE SODIUM 40 MG/1
40 TABLET, DELAYED RELEASE ORAL DAILY
Qty: 30 TABLET | Refills: 0 | Status: SHIPPED | OUTPATIENT
Start: 2021-08-06 | End: 2021-08-12 | Stop reason: SDUPTHER

## 2021-08-06 RX ORDER — RAMIPRIL 10 MG/1
10 CAPSULE ORAL 2 TIMES DAILY
Qty: 60 CAPSULE | Refills: 0 | Status: SHIPPED | OUTPATIENT
Start: 2021-08-06 | End: 2021-08-12 | Stop reason: SDUPTHER

## 2021-08-06 RX ORDER — CLOPIDOGREL BISULFATE 75 MG/1
75 TABLET ORAL DAILY
Qty: 30 TABLET | Refills: 0 | Status: SHIPPED | OUTPATIENT
Start: 2021-08-06 | End: 2021-08-12 | Stop reason: SDUPTHER

## 2021-08-06 RX ORDER — MECLIZINE HCL 12.5 MG 12.5 MG/1
12.5 TABLET ORAL
Qty: 30 TABLET | Refills: 0 | Status: SHIPPED | OUTPATIENT
Start: 2021-08-06 | End: 2021-08-12 | Stop reason: SDUPTHER

## 2021-08-06 RX ORDER — AMLODIPINE BESYLATE 5 MG/1
5 TABLET ORAL DAILY
Qty: 30 TABLET | Refills: 0 | Status: SHIPPED | OUTPATIENT
Start: 2021-08-06 | End: 2021-08-12 | Stop reason: SDUPTHER

## 2021-08-06 RX ORDER — ALUMINUM ZIRCONIUM OCTACHLOROHYDREX GLY 16 G/100G
1 GEL TOPICAL DAILY
Qty: 30 CAPSULE | Refills: 0 | Status: SHIPPED | OUTPATIENT
Start: 2021-08-06 | End: 2021-08-12 | Stop reason: SDUPTHER

## 2021-08-06 RX ORDER — TRAMADOL HYDROCHLORIDE 50 MG/1
50 TABLET ORAL
Qty: 90 TABLET | Refills: 0 | Status: SHIPPED | OUTPATIENT
Start: 2021-08-06 | End: 2021-08-12 | Stop reason: SDUPTHER

## 2021-08-11 ENCOUNTER — PATIENT MESSAGE (OUTPATIENT)
Dept: FAMILY MEDICINE CLINIC | Age: 76
End: 2021-08-11

## 2021-08-11 DIAGNOSIS — E11.22 TYPE 2 DIABETES MELLITUS WITH STAGE 2 CHRONIC KIDNEY DISEASE, WITH LONG-TERM CURRENT USE OF INSULIN (HCC): ICD-10-CM

## 2021-08-11 DIAGNOSIS — H81.10 BENIGN PAROXYSMAL POSITIONAL VERTIGO, UNSPECIFIED LATERALITY: ICD-10-CM

## 2021-08-11 DIAGNOSIS — R19.7 DIARRHEA, UNSPECIFIED TYPE: ICD-10-CM

## 2021-08-11 DIAGNOSIS — N18.2 TYPE 2 DIABETES MELLITUS WITH STAGE 2 CHRONIC KIDNEY DISEASE, WITH LONG-TERM CURRENT USE OF INSULIN (HCC): ICD-10-CM

## 2021-08-11 DIAGNOSIS — I10 ESSENTIAL HYPERTENSION: Chronic | ICD-10-CM

## 2021-08-11 DIAGNOSIS — Z79.4 TYPE 2 DIABETES MELLITUS WITH STAGE 2 CHRONIC KIDNEY DISEASE, WITH LONG-TERM CURRENT USE OF INSULIN (HCC): ICD-10-CM

## 2021-08-11 DIAGNOSIS — M79.2 NEUROPATHIC PAIN: ICD-10-CM

## 2021-08-11 NOTE — TELEPHONE ENCOUNTER
TRAMADOL 50 - 270 TABS  was the 90 day supply. GABAPENTIN 300 MG - 180 CAPS was for the 90 day supply    Pt medications were lost in the mail. He never received any of his meds. The others were ok but since these two are Controlled Substance they will have to be reorder. IT WOULD NOT LET ME REORDER STATING IT WAS TOO SOON.

## 2021-08-12 RX ORDER — RAMIPRIL 10 MG/1
10 CAPSULE ORAL 2 TIMES DAILY
Qty: 180 CAPSULE | Refills: 1 | Status: SHIPPED | OUTPATIENT
Start: 2021-08-12 | End: 2022-07-05

## 2021-08-12 RX ORDER — ALUMINUM ZIRCONIUM OCTACHLOROHYDREX GLY 16 G/100G
1 GEL TOPICAL DAILY
Qty: 90 CAPSULE | Refills: 1 | Status: SHIPPED | OUTPATIENT
Start: 2021-08-12 | End: 2021-10-13

## 2021-08-12 RX ORDER — TRAMADOL HYDROCHLORIDE 50 MG/1
50 TABLET ORAL
Qty: 270 TABLET | Refills: 0 | Status: SHIPPED | OUTPATIENT
Start: 2021-08-12 | End: 2022-02-03

## 2021-08-12 RX ORDER — PANTOPRAZOLE SODIUM 40 MG/1
40 TABLET, DELAYED RELEASE ORAL DAILY
Qty: 90 TABLET | Refills: 1 | Status: SHIPPED | OUTPATIENT
Start: 2021-08-12 | End: 2022-07-05

## 2021-08-12 RX ORDER — MECLIZINE HCL 12.5 MG 12.5 MG/1
12.5 TABLET ORAL
Qty: 90 TABLET | Refills: 1 | Status: SHIPPED | OUTPATIENT
Start: 2021-08-12 | End: 2022-02-03

## 2021-08-12 RX ORDER — AMLODIPINE BESYLATE 5 MG/1
5 TABLET ORAL DAILY
Qty: 90 TABLET | Refills: 1 | Status: SHIPPED | OUTPATIENT
Start: 2021-08-12 | End: 2022-07-05

## 2021-08-12 RX ORDER — CLOPIDOGREL BISULFATE 75 MG/1
75 TABLET ORAL DAILY
Qty: 90 TABLET | Refills: 1 | Status: SHIPPED | OUTPATIENT
Start: 2021-08-12 | End: 2022-07-05

## 2021-08-12 RX ORDER — SIMVASTATIN 20 MG/1
20 TABLET, FILM COATED ORAL
Qty: 90 TABLET | Refills: 1 | Status: SHIPPED | OUTPATIENT
Start: 2021-08-12 | End: 2022-07-05

## 2021-08-12 RX ORDER — GABAPENTIN 300 MG/1
600 CAPSULE ORAL
Qty: 180 CAPSULE | Refills: 1 | Status: SHIPPED | OUTPATIENT
Start: 2021-08-12 | End: 2022-02-03

## 2021-08-12 RX ORDER — METFORMIN HYDROCHLORIDE 1000 MG/1
1000 TABLET ORAL 2 TIMES DAILY
Qty: 180 TABLET | Refills: 1 | Status: SHIPPED | OUTPATIENT
Start: 2021-08-12 | End: 2022-07-05

## 2021-08-12 NOTE — TELEPHONE ENCOUNTER
From: [facility]  To: Felicia Cruz MD  Sent: 8/11/2021 5:09 PM EDT  Subject: Prescription Question    Dr. Clara Bernal,  I am on the phone with [employer] mail order and they are saying that you have not responded to their fax about mom's prescriptions. I see that you sent them to the [facility] in [place] on the 9th but I need for all of her scripts to be sent to [facility] mail order asa so they will send them out overnight and she can get them by Friday. I am so sorry for all of the confusion with this but they are not being very helpful with this situation and will not send them again because she has no refills on any of them even though she did not receive the last order they sent out on July 19th, it is lost somewhere in [address] [place] [address]. If you have any questions please give me a call at [phone].   Thank you so much!!

## 2021-08-24 ENCOUNTER — OFFICE VISIT (OUTPATIENT)
Dept: RHEUMATOLOGY | Age: 76
End: 2021-08-24
Payer: MEDICARE

## 2021-08-24 VITALS
DIASTOLIC BLOOD PRESSURE: 54 MMHG | BODY MASS INDEX: 26.09 KG/M2 | HEIGHT: 65 IN | RESPIRATION RATE: 16 BRPM | HEART RATE: 69 BPM | OXYGEN SATURATION: 94 % | TEMPERATURE: 98.4 F | SYSTOLIC BLOOD PRESSURE: 157 MMHG | WEIGHT: 156.6 LBS

## 2021-08-24 DIAGNOSIS — Z79.899 ONGOING USE OF POSSIBLY TOXIC MEDICATION: ICD-10-CM

## 2021-08-24 DIAGNOSIS — M06.00 SERONEGATIVE RHEUMATOID ARTHRITIS (HCC): Primary | ICD-10-CM

## 2021-08-24 DIAGNOSIS — M81.0 AGE-RELATED OSTEOPOROSIS WITHOUT CURRENT PATHOLOGICAL FRACTURE: ICD-10-CM

## 2021-08-24 PROCEDURE — G8510 SCR DEP NEG, NO PLAN REQD: HCPCS | Performed by: INTERNAL MEDICINE

## 2021-08-24 PROCEDURE — G8753 SYS BP > OR = 140: HCPCS | Performed by: INTERNAL MEDICINE

## 2021-08-24 PROCEDURE — 1090F PRES/ABSN URINE INCON ASSESS: CPT | Performed by: INTERNAL MEDICINE

## 2021-08-24 PROCEDURE — G8536 NO DOC ELDER MAL SCRN: HCPCS | Performed by: INTERNAL MEDICINE

## 2021-08-24 PROCEDURE — G8427 DOCREV CUR MEDS BY ELIG CLIN: HCPCS | Performed by: INTERNAL MEDICINE

## 2021-08-24 PROCEDURE — G8419 CALC BMI OUT NRM PARAM NOF/U: HCPCS | Performed by: INTERNAL MEDICINE

## 2021-08-24 PROCEDURE — 99215 OFFICE O/P EST HI 40 MIN: CPT | Performed by: INTERNAL MEDICINE

## 2021-08-24 PROCEDURE — 3288F FALL RISK ASSESSMENT DOCD: CPT | Performed by: INTERNAL MEDICINE

## 2021-08-24 PROCEDURE — G8754 DIAS BP LESS 90: HCPCS | Performed by: INTERNAL MEDICINE

## 2021-08-24 PROCEDURE — 1100F PTFALLS ASSESS-DOCD GE2>/YR: CPT | Performed by: INTERNAL MEDICINE

## 2021-08-24 NOTE — PATIENT INSTRUCTIONS
1. Joints look good today, continue daily Chi Souza. 2. Repeat labs in 2-3 months. 3. Continue BC powders sparingly until we see the effects on your kidney function. 4. I'd recommend getting a 3rd booster vaccine at least before the Holidays. 5. Return in 3-4 months.

## 2021-08-24 NOTE — PROGRESS NOTES
Chief Complaint   Patient presents with    Joint Pain     fingers     1. Have you been to the ER, urgent care clinic since your last visit? Hospitalized since your last visit? No    2. Have you seen or consulted any other health care providers outside of the [address] [place] [address] since your last visit? Include any pap smears or colon screening.  Yes Where: eye doctor

## 2021-08-24 NOTE — PROGRESS NOTES
REASON FOR VISIT:    is a 67 y.o. 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 HISTORY OF PRESENT ILLNESS    Takes BC arthritis once or twice a day which has helped pain. Enrrique Hoffman seems to still be helping    Pain is worst now in the low back. Lives in a trailer behind her daughter, has some difficulty walking the 150 feet to her daughter's house, has to sit once she gets back. Not particularly clauditory calf pain. Lidocaine patches to the low back help. Had been to a Pain Management doctor in the past at [facility] and was told there wasn't much they could do other than an RF ablation, and had not been interested in this at the time. Has had the shingles vaccine previously after an attack of shingles on the chest 2 years ago.       REVIEW OF SYSTEMS  Negatives as follows:  Nya Parody:    Denies unexplained persistent fevers, weight change, chronic fatigue  HEAD/EYES:              Denies eye redness, blurry vision or sudden loss of vision, dry eyes, HA, temporal pain  ENT:                            Denies oral/nasal ulcers, recurrent sinus infections, dry mouth  RESPIRATORY:         No pleuritic pain, history of pleural effusions, hemoptysis, exertional dyspnea  CARDIOVASCULAR:             Denies chest pain, history of pericardial effusions  GASTRO:                    Denies heartburn, esophageal dysmotility, abdominal pain, nausea, vomiting, diarrhea, blood in the stool  HEMATOLOGIC:        No easy bruising, purpura, swollen lymph nodes  SKIN:                           Denies alopecia, ulcers, nodules, sun sensitivity, unexplained persistent rash   VASCULAR:                Denies edema, cyanosis, raynaud phenomenon  NEUROLOGIC:           +paresthesias in hands and feet Denies specific muscle weakness  PSYCHIATRIC:           No sleep disturbance / snoring, depression, anxiety  MSK:                           +extended stiffness, persistent joint pain      Review of systems is as above and is otherwise negative. ALLERGIES  Codeine and Sulfa (sulfonamide antibiotics)    MEDICATIONS  Current Outpatient Medications   Medication Sig    amLODIPine (NORVASC) 5 mg tablet Take 1 Tablet by mouth daily.  Bifidobacterium Infantis (Align) 4 mg cap Take 1 Capsule by mouth daily.  clopidogreL (PLAVIX) 75 mg tab Take 1 Tablet by mouth daily.  gabapentin (NEURONTIN) 300 mg capsule Take 2 Capsules by mouth nightly. Max Daily Amount: 600 mg.    insulin detemir U-100 (LEVEMIR) 100 unit/mL injection 25 Units by SubCUTAneous route two (2) times a day. Vials. Dx E11.4    meclizine (ANTIVERT) 12.5 mg tablet Take 1 Tablet by mouth three (3) times daily as needed for Dizziness.  metFORMIN (GLUCOPHAGE) 1,000 mg tablet Take 1 Tablet by mouth two (2) times a day.  pantoprazole (PROTONIX) 40 mg tablet Take 1 Tablet by mouth daily.  ramipriL (ALTACE) 10 mg capsule Take 1 Capsule by mouth two (2) times a day.  simvastatin (ZOCOR) 20 mg tablet Take 1 Tablet by mouth nightly.  traMADoL (ULTRAM) 50 mg tablet Take 1 Tablet by mouth every eight (8) hours as needed for Pain for up to 90 days. Max Daily Amount: 150 mg.    tofacitinib 11 mg Tb24 Take 11 mg by mouth daily. Indications: rheumatoid arthritis    glucose blood VI test strips (Contour Next Test Strips) strip Check BG up to 4 times daily. Dx E11.9    Ferrous Fumarate 325 mg (106 mg iron) tab Take  by mouth.  cyanocobalamin (VITAMIN B12) 500 mcg tablet Take 1 Tab by mouth daily.  cholecalciferol (VITAMIN D3) (2,000 UNITS /50 MCG) cap capsule Take 2,000 Units by mouth daily.  triamcinolone acetonide (KENALOG) 0.1 % ointment Apply  to affected area two (2) times a day. use thin layer on upper back (Patient taking differently: Apply  to affected area as needed.  use thin layer on upper back)    famotidine (PEPCID) 40 mg tablet Take 40 mg by mouth daily.  diclofenac EC (VOLTAREN) 75 mg EC tablet Take 75 mg by mouth daily.  loratadine (Claritin) 10 mg tablet Take 10 mg by mouth two (2) times a day.  insulin aspart U-100 (NovoLOG U-100 Insulin aspart) 100 unit/mL injection by SubCUTAneous route as needed.  tofacitinib 11 mg Tb24 Take 11 mg by mouth daily. Indications: rheumatoid arthritis (Patient not taking: Reported on 8/24/2021)    tofacitinib 11 mg Tb24 Take 11 mg by mouth daily. Indications: rheumatoid arthritis (Patient not taking: Reported on 8/24/2021)    tofacitinib 11 mg Tb24 Take 11 mg by mouth daily. Indications: rheumatoid arthritis (Patient not taking: Reported on 8/24/2021)     No current facility-administered medications for this visit. PAST MEDICAL HISTORY  Past Medical History:   Diagnosis Date    Chronic pain     Diabetes ([address]     Hypertension     Ovarian cyst     Removed Laproscopically       FAMILY HISTORY  Sister has rheumatoid arthritis, father had erosive osteoarthritis. SOCIAL HISTORY  She  reports that she has never smoked. She has never used smokeless tobacco. She reports that she does not drink alcohol and does not use drugs. Social History     Social History Narrative    Cos[employer]ology school and course of Psychology   now cleans offices part-time, previously also CNA. DATA  Visit Vitals  BP (!) 157/54 (BP 1 Location: Left upper arm, BP Patient Position: Sitting)   Pulse 69   Temp 98.4 °F (36.9 °C) (Oral)   Resp 16   Ht 5' 5\" (1.651 m)   Wt 156 lb 9.6 oz (71 kg)   SpO2 94%   BMI 26.06 kg/m²    Body mass index is 26.06 kg/m². No flowsheet data found. General:  The patient is well developed, well nourished, alert, and in no apparent distress. Eyes: Sclera are anicteric. No conjunctival injection. HEENT:  Oropharynx is clear. No oral ulcers. Adequate salivary pooling. No cervical or supraclavicular lymphadenopathy.    Lungs:  Clear to auscultation bilaterally, without wheeze or stridor. Normal respiratory effort. Cor:  Regular rate and rhythm. No murmur rub or gallop. Abdomen: Soft, non-tender, without hepatomegaly or masses. Extremities: No calf tenderness or edema. Warm and well perfused. Skin:  No significant abnormalities. No psoriaform lesions, no tophi. Neuro: +SLR bilaterally, normal gait. No LE muscle wasting, normal reflexes. Musculoskeletal:    A comprehensive musculoskeletal exam was performed for all joints of each upper and lower extremity and assessed for swelling, tenderness and range of motion. Results are documented as below:  Marked Cleo nodes globally right hand with interval resolution of associated tenderness. Still no synovitis currently. Pan-flexion deformities of right hand; index PIP fused, largely stable ~15deg flexion in other PIPs. Bilateral knee crepitus. No evidence of synovitis in the small joints of the hands, wrists, shoulders, elbows, hips, knees or ankles. Labs:  21: CBC WNL; ESR 21; Cr 1.2, LFTs WNL; TSH WNL; iPTH WNL; CRP 16mg/L  21: Cr 1.38, CMP WNL, CBC WNL (Hgb 10.5)  20: CBC WNL, Cr 1.1, Hep B cAb neg, sAb neg, sAg neg; Hep C Ab neg; QFN gold neg  20:     Imagin21 DXA:  This patient is osteoporotic using the World Health Organization criteria  10 year probability of major osteoporotic fracture: 22.3%  10 year probability of hip fracture: 8.7%    21 XR R hand: FINDINGS: Three views of the right hand demonstrate interphalangeal joints  demonstrate joint space narrowing. DIP joints degenerative joint space  narrowing. Carpal metacarpal joint space narrowing. Radiocarpal joint space  narrowing. . There are small erosions present. There is ulnar deviation at the  DIP joints. IMPRESSION  There is a combination of osteoarthritis and erosive arthritis. XR L hand: Moderate to severe osteoarthritis. .    17 XR Lspine (report only):  Impression: Plain radiographs demonstrate 100% loss of disc height at L3-4, L4-5, and L5-S1 with resultant foraminal stenosis. ASSESSMENT AND PLAN  Ms. Yonny Marmolejo is a 67 y.o. 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 Inflammatory arthritis still in clinical remission on tofacitinib. Reviewed concerns with CKD and aspirin use (in [facility] powders) though Cr has been stable, sparing use may be tolerated if insufficient responsiveness to Tylenol. 1. Seronegative rheumatoid arthritis ([address]  - Cont tofacitinib 11mg daily  - C REACTIVE PROTEIN, QT; Future  - CBC WITH AUTOMATED DIFF; Future  - METABOLIC PANEL, COMPREHENSIVE; Future  - SED RATE (ESR); Future    2. Ongoing use of possibly toxic medication  - CBC WITH AUTOMATED DIFF; Future  - METABOLIC PANEL, COMPREHENSIVE; Future    3. Age-related osteoporosis without current pathological fracture  -Cont Prolia every 6 months (7/21/21, next January 2022)           Patient Instructions   1. Joints look good today, continue daily Sukh Citrin. 2. Repeat labs in 2-3 months. 3. Continue BC powders sparingly until we see the effects on your kidney function. 4. I'd recommend getting a 3rd booster vaccine at least before the Holidays. 5. Return in 3-4 months. Orders Placed This Encounter    C REACTIVE PROTEIN, QT    CBC WITH AUTOMATED DIFF    METABOLIC PANEL, COMPREHENSIVE    SED RATE (ESR)       Medications: I am having Gordon Newton maintain her famotidine, diclofenac EC, loratadine, insulin aspart U-100, triamcinolone acetonide, cyanocobalamin, cholecalciferol, Ferrous Fumarate, tofacitinib, Contour Next Test Strips, tofacitinib, tofacitinib, tofacitinib, amLODIPine, Align, clopidogreL, gabapentin, insulin detemir U-100, meclizine, metFORMIN, pantoprazole, ramipriL, simvastatin, and traMADoL.     Follow up: 3 months    Face to face time: 30 minutes  Note preparation and records review day of service: 15 minutes  Total provider time day of service: 45 minutes      Marcelina Araujo MD    Adult Rheumatology   [facility]  A Part of [facility], [place], [facility],  [address]   Phone [phone]  Fax [phone]

## 2021-10-13 ENCOUNTER — OFFICE VISIT (OUTPATIENT)
Dept: FAMILY MEDICINE CLINIC | Age: 76
End: 2021-10-13
Payer: MEDICARE

## 2021-10-13 VITALS
HEART RATE: 67 BPM | BODY MASS INDEX: 26.16 KG/M2 | SYSTOLIC BLOOD PRESSURE: 142 MMHG | WEIGHT: 157 LBS | TEMPERATURE: 98 F | DIASTOLIC BLOOD PRESSURE: 82 MMHG | RESPIRATION RATE: 18 BRPM | HEIGHT: 65 IN | OXYGEN SATURATION: 99 %

## 2021-10-13 DIAGNOSIS — E11.22 TYPE 2 DIABETES MELLITUS WITH STAGE 2 CHRONIC KIDNEY DISEASE, WITH LONG-TERM CURRENT USE OF INSULIN (HCC): Chronic | ICD-10-CM

## 2021-10-13 DIAGNOSIS — M81.0 OSTEOPOROSIS, UNSPECIFIED OSTEOPOROSIS TYPE, UNSPECIFIED PATHOLOGICAL FRACTURE PRESENCE: ICD-10-CM

## 2021-10-13 DIAGNOSIS — Z79.4 TYPE 2 DIABETES MELLITUS WITH STAGE 2 CHRONIC KIDNEY DISEASE, WITH LONG-TERM CURRENT USE OF INSULIN (HCC): Chronic | ICD-10-CM

## 2021-10-13 DIAGNOSIS — M79.605 PAIN IN BOTH LOWER EXTREMITIES: ICD-10-CM

## 2021-10-13 DIAGNOSIS — N18.2 TYPE 2 DIABETES MELLITUS WITH STAGE 2 CHRONIC KIDNEY DISEASE, WITH LONG-TERM CURRENT USE OF INSULIN (HCC): Chronic | ICD-10-CM

## 2021-10-13 DIAGNOSIS — I10 ESSENTIAL HYPERTENSION: ICD-10-CM

## 2021-10-13 DIAGNOSIS — G89.29 OTHER CHRONIC PAIN: ICD-10-CM

## 2021-10-13 DIAGNOSIS — E11.40 TYPE 2 DIABETES MELLITUS WITH DIABETIC NEUROPATHY, WITHOUT LONG-TERM CURRENT USE OF INSULIN (HCC): Primary | ICD-10-CM

## 2021-10-13 DIAGNOSIS — M79.604 PAIN IN BOTH LOWER EXTREMITIES: ICD-10-CM

## 2021-10-13 PROBLEM — M19.90 ARTHRITIS: Chronic | Status: ACTIVE | Noted: 2021-10-13

## 2021-10-13 PROCEDURE — G8536 NO DOC ELDER MAL SCRN: HCPCS | Performed by: FAMILY MEDICINE

## 2021-10-13 PROCEDURE — G8510 SCR DEP NEG, NO PLAN REQD: HCPCS | Performed by: FAMILY MEDICINE

## 2021-10-13 PROCEDURE — G8754 DIAS BP LESS 90: HCPCS | Performed by: FAMILY MEDICINE

## 2021-10-13 PROCEDURE — G8427 DOCREV CUR MEDS BY ELIG CLIN: HCPCS | Performed by: FAMILY MEDICINE

## 2021-10-13 PROCEDURE — 1100F PTFALLS ASSESS-DOCD GE2>/YR: CPT | Performed by: FAMILY MEDICINE

## 2021-10-13 PROCEDURE — G8753 SYS BP > OR = 140: HCPCS | Performed by: FAMILY MEDICINE

## 2021-10-13 PROCEDURE — 3288F FALL RISK ASSESSMENT DOCD: CPT | Performed by: FAMILY MEDICINE

## 2021-10-13 PROCEDURE — G8419 CALC BMI OUT NRM PARAM NOF/U: HCPCS | Performed by: FAMILY MEDICINE

## 2021-10-13 PROCEDURE — 1090F PRES/ABSN URINE INCON ASSESS: CPT | Performed by: FAMILY MEDICINE

## 2021-10-13 PROCEDURE — 99214 OFFICE O/P EST MOD 30 MIN: CPT | Performed by: FAMILY MEDICINE

## 2021-10-13 PROCEDURE — 3051F HG A1C>EQUAL 7.0%<8.0%: CPT | Performed by: FAMILY MEDICINE

## 2021-10-13 NOTE — PROGRESS NOTES
[facility]    History of Present Illness:   Andrew Robert is a 67 y.o. female with history of HTN, TIA, DM, Chronic Pain  CC: Chronic Conditions  History provided by patient and Records    HPI:  Diabetes Follow up: Overall the patient feels well with good energy level. Current Medications:   Key Antihyperglycemic Medications             insulin detemir U-100 (LEVEMIR) 100 unit/mL injection (Taking) 25 Units by SubCUTAneous route two (2) times a day. Vials. Dx E11.4    metFORMIN (GLUCOPHAGE) 1,000 mg tablet (Taking) Take 1 Tablet by mouth two (2) times a day. insulin aspart U-100 (NovoLOG U-100 Insulin aspart) 100 unit/mL injection (Taking) by SubCUTAneous route as needed. Insulin dependence: YES   Frequency of home glucose testing: Daily   Blood Sugar range at home: Closer to 200    Last eye exam: In past 12 months. Last foot exam: This year. Polyuria, polyphagia or polydipsia: NO   Retinopathy: NO   Neuropathy SX: yes   Low blood sugar symptoms: NO   Dietary compliance: compliant most of the time   Medication compliance: compliant most of the time   On ASA: NO   Tobacco Use: NO   Depression: NO     Wt Readings from Last 3 Encounters:   10/13/21 157 lb (71.2 kg)   08/24/21 156 lb 9.6 oz (71 kg)   04/29/21 156 lb 12.8 oz (71.1 kg)      Lab Results   Component Value Date/Time    Hemoglobin A1c 7.8 (H) 04/20/2021 08:55 AM      Lab Results   Component Value Date/Time    Microalbumin/Creat ratio (mg/g creat) 27 06/08/2020 03:52 PM    Microalbumin,urine random 4.25 06/08/2020 03:52 PM       Lab Results   Component Value Date/Time    Creatinine 1.20 (H) 07/14/2021 01:26 PM      Hypertension Follow up:  Currently Taking:   Key CAD CHF Meds             aspirin/caffeine (BC ARTHRITIS PO) (Taking) Take  by mouth two (2) times daily as needed. amLODIPine (NORVASC) 5 mg tablet (Taking) Take 1 Tablet by mouth daily.     clopidogreL (PLAVIX) 75 mg tab (Taking) Take 1 Tablet by mouth daily. ramipriL (ALTACE) 10 mg capsule (Taking) Take 1 Capsule by mouth two (2) times a day. simvastatin (ZOCOR) 20 mg tablet (Taking) Take 1 Tablet by mouth nightly. The patient reports:  taking medications as instructed, no medication side effects noted, home BP monitoring in range of 089'W systolic over 61'E diastolic, no TIA's, no chest pain on exertion, no dyspnea on exertion, no swelling of ankles, no orthostatic dizziness or lightheadedness, no orthopnea or paroxysmal nocturnal dyspnea. BP Readings from Last 3 Encounters:   10/13/21 (!) 142/82   08/24/21 (!) 157/54   07/21/21 (!) 173/53      Chronic Pain: Taking Gabapentin and Tramadol. 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 Improved overall. Also noting more recently pain in the legs with activity. GERD: Taking Protonix currently. Hx of TIA: Taking Plavix. Health Maintenance  Health Maintenance Due   Topic Date Due    Eye Exam Retinal or Dilated  Never done    DTaP/Tdap/Td series (1 - Tdap) Never done    Pneumococcal 65+ years (1 of 1 - PPSV23) Never done    Medicare Yearly Exam  Never done    MICROALBUMIN Q1  06/08/2021    Flu Vaccine (1) 09/01/2021       Past Medical, Family, and Social History:     Current Outpatient Medications on File Prior to Visit   Medication Sig Dispense Refill    OTHER Allergy plus patch from [facility]      aspirin/caffeine (BC ARTHRITIS PO) Take  by mouth two (2) times daily as needed.  amLODIPine (NORVASC) 5 mg tablet Take 1 Tablet by mouth daily. 90 Tablet 1    clopidogreL (PLAVIX) 75 mg tab Take 1 Tablet by mouth daily. 90 Tablet 1    gabapentin (NEURONTIN) 300 mg capsule Take 2 Capsules by mouth nightly. Max Daily Amount: 600 mg. 180 Capsule 1    insulin detemir U-100 (LEVEMIR) 100 unit/mL injection 25 Units by SubCUTAneous route two (2) times a day. Vials.   Dx E11.4 45 mL 1    meclizine (ANTIVERT) 12.5 mg tablet Take 1 Tablet by mouth three (3) times daily as needed for Dizziness. 90 Tablet 1    metFORMIN (GLUCOPHAGE) 1,000 mg tablet Take 1 Tablet by mouth two (2) times a day. 180 Tablet 1    pantoprazole (PROTONIX) 40 mg tablet Take 1 Tablet by mouth daily. 90 Tablet 1    ramipriL (ALTACE) 10 mg capsule Take 1 Capsule by mouth two (2) times a day. 180 Capsule 1    simvastatin (ZOCOR) 20 mg tablet Take 1 Tablet by mouth nightly. 90 Tablet 1    traMADoL (ULTRAM) 50 mg tablet Take 1 Tablet by mouth every eight (8) hours as needed for Pain for up to 90 days. Max Daily Amount: 150 mg. 270 Tablet 0    tofacitinib 11 mg Tb24 Take 11 mg by mouth daily. Indications: rheumatoid arthritis 30 Tab 0    Ferrous Fumarate 325 mg (106 mg iron) tab Take  by mouth.  cyanocobalamin (VITAMIN B12) 500 mcg tablet Take 1 Tab by mouth daily. 90 Tab 1    cholecalciferol (VITAMIN D3) (2,000 UNITS /50 MCG) cap capsule Take 2,000 Units by mouth daily. 90 Cap 1    triamcinolone acetonide (KENALOG) 0.1 % ointment Apply  to affected area two (2) times a day. use thin layer on upper back (Patient taking differently: Apply  to affected area as needed. use thin layer on upper back) 80 g 1    famotidine (PEPCID) 40 mg tablet Take 40 mg by mouth daily.  diclofenac EC (VOLTAREN) 75 mg EC tablet Take 75 mg by mouth daily.  insulin aspart U-100 (NovoLOG U-100 Insulin aspart) 100 unit/mL injection by SubCUTAneous route as needed.  [DISCONTINUED] Bifidobacterium Infantis (Align) 4 mg cap Take 1 Capsule by mouth daily. (Patient not taking: Reported on 10/13/2021) 90 Capsule 1    [DISCONTINUED] tofacitinib 11 mg Tb24 Take 11 mg by mouth daily. Indications: rheumatoid arthritis (Patient not taking: Reported on 8/24/2021) 30 Tab 11    [DISCONTINUED] tofacitinib 11 mg Tb24 Take 11 mg by mouth daily. Indications: rheumatoid arthritis (Patient not taking: Reported on 8/24/2021) 30 Tab 0    [DISCONTINUED] glucose blood VI test strips (Contour Next Test Strips) strip Check BG up to 4 times daily. Dx E11.9 200 Strip 3    [DISCONTINUED] tofacitinib 11 mg Tb24 Take 11 mg by mouth daily. Indications: rheumatoid arthritis (Patient not taking: Reported on 8/24/2021) 60 Tab 0    [DISCONTINUED] loratadine (Claritin) 10 mg tablet Take 10 mg by mouth two (2) times a day. (Patient not taking: Reported on 10/13/2021)       No current facility-administered medications on file prior to visit. Patient Active Problem List   Diagnosis Code    Chronic pain G89.29    Type 2 diabetes mellitus with stage 2 chronic kidney disease, with long-term current use of insulin ([facility]) E11.22, N18.2, Z79.4    Essential hypertension I10    Type 2 diabetes mellitus with diabetic neuropathy ([facility] E11.40    Hx of transient ischemic attack (TIA) Z86.73    Osteoporosis M81.0    Arthritis M19.90       Social History     Socioeconomic History    Marital status:      Spouse name: Not on file    Number of children: Not on file    Years of education: Not on file    Highest education level: Not on file   Occupational History    Not on file   Tobacco Use    Smoking status: Never Smoker    Smokeless tobacco: Never Used   Substance and Sexual Activity    Alcohol use: Never    Drug use: Never    Sexual activity: Not on file   Other Topics Concern    Not on file   Social History Narrative    [employer] school and course of Psychology     Social Determinants of Health     Financial Resource Strain:     Difficulty of Paying Living Expenses:    Food Insecurity:     Worried About Running Out of Food in the Last Year:     Ran Out of Food in the Last Year:    Transportation Needs:     Lack of Transportation (Medical):      Lack of Transportation (Non-Medical):    Physical Activity:     Days of Exercise per Week:     Minutes of Exercise per Session:    Stress:     Feeling of Stress :    Social Connections:     Frequency of Communication with Friends and Family:     Frequency of Social Gatherings with Friends and Family:  Attends Alevism Services:     Active Member of Clubs or Organizations:     Attends Club or Organization Meetings:     Marital Status:    Intimate Partner Violence:     Fear of Current or Ex-Partner:     Emotionally Abused:     Physically Abused:     Sexually Abused:        Review of Systems   Review of Systems   Constitutional: Negative for fever and malaise/fatigue. Cardiovascular: Negative for chest pain and palpitations. Gastrointestinal: Negative for abdominal pain, nausea and vomiting. Musculoskeletal: Negative for myalgias and neck pain. Objective:     Visit Vitals  BP (!) 142/82   Pulse 67   Temp 98 °F (36.7 °C) (Oral)   Resp 18   Ht 5' 5\" (1.651 m)   Wt 157 lb (71.2 kg)   SpO2 99%   BMI 26.13 kg/m²        Physical Exam  Vitals and nursing note reviewed. Constitutional:       Appearance: Normal appearance. HENT:      Head: Normocephalic and atraumatic. Cardiovascular:      Rate and Rhythm: Normal rate and regular rhythm. Pulses: Normal pulses. Heart sounds: Normal heart sounds. No murmur heard. No friction rub. No gallop. Pulmonary:      Effort: Pulmonary effort is normal.      Breath sounds: Normal breath sounds. Abdominal:      General: Abdomen is flat. Bowel sounds are normal.      Palpations: Abdomen is soft. Musculoskeletal:      Cervical back: Normal range of motion and neck supple. Skin:     General: Skin is warm and dry. Neurological:      Mental Status: She is alert. Diabetic foot exam:     Left Foot:   Visual Exam: normal    Pulse DP: 2+ (normal)   Filament test: reduced sensation    Vibratory sensation: absent      Right Foot:   Visual Exam: normal    Pulse DP: 2+ (normal)   Filament test: reduced sensation    Vibratory sensation: absent      Pertinent Labs/Studies:      Assessment and orders:       ICD-10-CM ICD-9-CM    1.  Type 2 diabetes mellitus with diabetic neuropathy, without long-term current use of insulin ([facility])  E11.40 250.60 LIPID PANEL     357.2 HEMOGLOBIN A1C WITH EAG      MICROALBUMIN, UR, RAND W/ MICROALB/CREAT RATIO      LIPID PANEL      HEMOGLOBIN A1C WITH EAG      MICROALBUMIN, UR, RAND W/ MICROALB/CREAT RATIO       DIABETES FOOT EXAM      MICROALBUMIN, UR, RAND W/ MICROALB/CREAT RATIO      HEMOGLOBIN A1C WITH EAG      LIPID PANEL      MICROALBUMIN, UR, RAND W/ MICROALB/CREAT RATIO      HEMOGLOBIN A1C WITH EAG      LIPID PANEL   2. Essential hypertension  I10 401.9 CBC W/O DIFF      METABOLIC PANEL, COMPREHENSIVE      CBC W/O DIFF      METABOLIC PANEL, COMPREHENSIVE      METABOLIC PANEL, COMPREHENSIVE      CBC W/O DIFF      METABOLIC PANEL, COMPREHENSIVE      CBC W/O DIFF   3. Osteoporosis, unspecified osteoporosis type, unspecified pathological fracture presence  M81.0 733.00    4. Other chronic pain  G89.29 338.29 DRUG SCREEN, URINE      COMPLIANCE DRUG SCREEN/PRESCRIPTION MONITORING      COMPLIANCE DRUG SCREEN/PRESCRIPTION MONITORING      DRUG SCREEN, URINE   5. Type 2 diabetes mellitus with stage 2 chronic kidney disease, with long-term current use of insulin ([facility])  E11.22 250.40     N18.2 585.2     Z79.4 V58.67    6. Pain in both lower extremities  M79.604 729.5     M79.605       Diagnoses and all orders for this visit:    1. Type 2 diabetes mellitus with diabetic neuropathy, without long-term current use of insulin [facility]): Discussed with patient today that the goal for their diabetes is to have a HgA1C<7 and ideally as close to 6.5 as possible. We discussed diet and medications. The goal for the cholesterol LDL is less than 70 and HDL>40. Patient is aware of the need for yearly eye exams and to take care of their feet daily. Discussed with patient that blood pressure should be less than 130/80 and watching salt intake is very important.    -     LIPID PANEL; Future  -     HEMOGLOBIN A1C WITH EAG; Future  -     MICROALBUMIN, UR, RAND W/ MICROALB/CREAT RATIO; Future  -     LIPID PANEL;  Future  -     HEMOGLOBIN A1C WITH EAG; Future  -     MICROALBUMIN, UR, RAND W/ MICROALB/CREAT RATIO; Future    2. Essential hypertension: Our goal is to normalize the blood pressure to decrease the risks of strokes and heart attacks. The patient is in agreement with the plan. -     CBC W/O DIFF; Future  -     METABOLIC PANEL, COMPREHENSIVE; Future  -     CBC W/O DIFF; Future  -     METABOLIC PANEL, COMPREHENSIVE; Future    3. Osteoporosis, unspecified osteoporosis type, unspecified pathological fracture presence    4. Other chronic pain  -     DRUG SCREEN, URINE; Future  -     COMPLIANCE DRUG SCREEN/PRESCRIPTION MONITORING; Future    5. Type 2 diabetes mellitus with stage 2 chronic kidney disease, with long-term current use of insulin ([facility])    6. Pain in both lower extremities      Follow-up and Dispositions    · Return in about 4 weeks (around 11/10/2021) for MWE. I have discussed the diagnosis with the patient and the intended plan as seen in the above orders. Social history, medical history, and labs were reviewed. The patient has received an after-visit summary and questions were answered concerning future plans. I have discussed medication side effects and warnings with the patient as well.     MD HOUSTON Carmona [facility] ALONDRA WOODS [facility]  10/13/21

## 2021-10-14 LAB
ALBUMIN SERPL-MCNC: 3.4 G/DL (ref 3.5–5)
ALBUMIN/GLOB SERPL: 0.9 {RATIO} (ref 1.1–2.2)
ALP SERPL-CCNC: 80 U/L (ref 45–117)
ALT SERPL-CCNC: 34 U/L (ref 12–78)
AMPHET UR QL SCN: NEGATIVE
ANION GAP SERPL CALC-SCNC: 5 MMOL/L (ref 5–15)
AST SERPL-CCNC: 22 U/L (ref 15–37)
BARBITURATES UR QL SCN: NEGATIVE
BENZODIAZ UR QL: NEGATIVE
BILIRUB SERPL-MCNC: 0.2 MG/DL (ref 0.2–1)
BUN SERPL-MCNC: 19 MG/DL (ref 6–20)
BUN/CREAT SERPL: 13 (ref 12–20)
CALCIUM SERPL-MCNC: 10.4 MG/DL (ref 8.5–10.1)
CANNABINOIDS UR QL SCN: NEGATIVE
CHLORIDE SERPL-SCNC: 106 MMOL/L (ref 97–108)
CHOLEST SERPL-MCNC: 197 MG/DL
CO2 SERPL-SCNC: 27 MMOL/L (ref 21–32)
COCAINE UR QL SCN: NEGATIVE
CREAT SERPL-MCNC: 1.47 MG/DL (ref 0.55–1.02)
CREAT UR-MCNC: 317 MG/DL
DRUG SCRN COMMENT,DRGCM: NORMAL
ERYTHROCYTE [DISTWIDTH] IN BLOOD BY AUTOMATED COUNT: 15.5 % (ref 11.5–14.5)
EST. AVERAGE GLUCOSE BLD GHB EST-MCNC: 189 MG/DL
GLOBULIN SER CALC-MCNC: 3.6 G/DL (ref 2–4)
GLUCOSE SERPL-MCNC: 69 MG/DL (ref 65–100)
HBA1C MFR BLD: 8.2 % (ref 4–5.6)
HCT VFR BLD AUTO: 32.4 % (ref 35–47)
HDLC SERPL-MCNC: 46 MG/DL
HDLC SERPL: 4.3 {RATIO} (ref 0–5)
HGB BLD-MCNC: 10.1 G/DL (ref 11.5–16)
LDLC SERPL CALC-MCNC: 101.4 MG/DL (ref 0–100)
MCH RBC QN AUTO: 28.1 PG (ref 26–34)
MCHC RBC AUTO-ENTMCNC: 31.2 G/DL (ref 30–36.5)
MCV RBC AUTO: 90.3 FL (ref 80–99)
METHADONE UR QL: NEGATIVE
MICROALBUMIN UR-MCNC: 20.4 MG/DL
MICROALBUMIN/CREAT UR-RTO: 64 MG/G (ref 0–30)
NRBC # BLD: 0 K/UL (ref 0–0.01)
NRBC BLD-RTO: 0 PER 100 WBC
OPIATES UR QL: NEGATIVE
PCP UR QL: NEGATIVE
PLATELET # BLD AUTO: 313 K/UL (ref 150–400)
PMV BLD AUTO: 10.6 FL (ref 8.9–12.9)
POTASSIUM SERPL-SCNC: 5.3 MMOL/L (ref 3.5–5.1)
PROT SERPL-MCNC: 7 G/DL (ref 6.4–8.2)
RBC # BLD AUTO: 3.59 M/UL (ref 3.8–5.2)
SODIUM SERPL-SCNC: 138 MMOL/L (ref 136–145)
TRIGL SERPL-MCNC: 248 MG/DL (ref ?–150)
VLDLC SERPL CALC-MCNC: 49.6 MG/DL
WBC # BLD AUTO: 8.1 K/UL (ref 3.6–11)

## 2021-10-19 LAB — DRUGS UR: NORMAL

## 2021-11-10 ENCOUNTER — TELEPHONE (OUTPATIENT)
Dept: RHEUMATOLOGY | Age: 76
End: 2021-11-10

## 2021-11-10 NOTE — TELEPHONE ENCOUNTER
----- Message from Erick Matt sent at 11/9/2021  4:25 PM EST -----  Regarding: ALTON/MD/TELEPHONE  Contact: [phone]  General Message/Vendor Calls    Caller's first and last name: Dao Denissuzanna      Reason for call: Confirm fax was received      Callback required yes/no and why: Yes      Best contact number(s): [phone]      Details to clarify the request: Dao Leung from [facility] [employer] calling to confirm that a application for Abel Riedel was received faxed on 11/08/21.       Erick Matt

## 2021-11-29 LAB
ALBUMIN SERPL-MCNC: 3.9 G/DL (ref 3.7–4.7)
ALBUMIN/GLOB SERPL: 1.3 {RATIO} (ref 1.2–2.2)
ALP SERPL-CCNC: 77 IU/L (ref 44–121)
ALT SERPL-CCNC: 18 IU/L (ref 0–32)
AST SERPL-CCNC: 18 IU/L (ref 0–40)
BASOPHILS # BLD AUTO: 0.1 X10E3/UL (ref 0–0.2)
BASOPHILS NFR BLD AUTO: 1 %
BILIRUB SERPL-MCNC: 0.2 MG/DL (ref 0–1.2)
BUN SERPL-MCNC: 10 MG/DL (ref 8–27)
BUN/CREAT SERPL: 8 (ref 12–28)
CALCIUM SERPL-MCNC: 9.7 MG/DL (ref 8.7–10.3)
CHLORIDE SERPL-SCNC: 101 MMOL/L (ref 96–106)
CO2 SERPL-SCNC: 21 MMOL/L (ref 20–29)
CREAT SERPL-MCNC: 1.3 MG/DL (ref 0.57–1)
CRP SERPL-MCNC: 6 MG/L (ref 0–10)
EOSINOPHIL # BLD AUTO: 0.1 X10E3/UL (ref 0–0.4)
EOSINOPHIL NFR BLD AUTO: 1 %
ERYTHROCYTE [DISTWIDTH] IN BLOOD BY AUTOMATED COUNT: 14.5 % (ref 11.7–15.4)
GLOBULIN SER CALC-MCNC: 2.9 G/DL (ref 1.5–4.5)
GLUCOSE SERPL-MCNC: 241 MG/DL (ref 65–99)
HCT VFR BLD AUTO: 35.9 % (ref 34–46.6)
HGB BLD-MCNC: 10.9 G/DL (ref 11.1–15.9)
IMM GRANULOCYTES # BLD AUTO: 0 X10E3/UL (ref 0–0.1)
IMM GRANULOCYTES NFR BLD AUTO: 0 %
LYMPHOCYTES # BLD AUTO: 1.5 X10E3/UL (ref 0.7–3.1)
LYMPHOCYTES NFR BLD AUTO: 21 %
MCH RBC QN AUTO: 27.4 PG (ref 26.6–33)
MCHC RBC AUTO-ENTMCNC: 30.4 G/DL (ref 31.5–35.7)
MCV RBC AUTO: 90 FL (ref 79–97)
MONOCYTES # BLD AUTO: 0.6 X10E3/UL (ref 0.1–0.9)
MONOCYTES NFR BLD AUTO: 7 %
NEUTROPHILS # BLD AUTO: 5.2 X10E3/UL (ref 1.4–7)
NEUTROPHILS NFR BLD AUTO: 70 %
PLATELET # BLD AUTO: 359 X10E3/UL (ref 150–450)
POTASSIUM SERPL-SCNC: 5 MMOL/L (ref 3.5–5.2)
PROT SERPL-MCNC: 6.8 G/DL (ref 6–8.5)
RBC # BLD AUTO: 3.98 X10E6/UL (ref 3.77–5.28)
SODIUM SERPL-SCNC: 139 MMOL/L (ref 134–144)
WBC # BLD AUTO: 7.5 X10E3/UL (ref 3.4–10.8)

## 2021-11-30 ENCOUNTER — OFFICE VISIT (OUTPATIENT)
Dept: RHEUMATOLOGY | Age: 76
End: 2021-11-30
Payer: MEDICARE

## 2021-11-30 VITALS
DIASTOLIC BLOOD PRESSURE: 71 MMHG | SYSTOLIC BLOOD PRESSURE: 189 MMHG | OXYGEN SATURATION: 98 % | BODY MASS INDEX: 25.56 KG/M2 | HEIGHT: 65 IN | HEART RATE: 75 BPM | RESPIRATION RATE: 18 BRPM | WEIGHT: 153.4 LBS | TEMPERATURE: 98.1 F

## 2021-11-30 DIAGNOSIS — M06.00 SERONEGATIVE RHEUMATOID ARTHRITIS (HCC): Primary | ICD-10-CM

## 2021-11-30 DIAGNOSIS — M81.0 AGE-RELATED OSTEOPOROSIS WITHOUT CURRENT PATHOLOGICAL FRACTURE: ICD-10-CM

## 2021-11-30 DIAGNOSIS — Z79.899 ONGOING USE OF POSSIBLY TOXIC MEDICATION: ICD-10-CM

## 2021-11-30 DIAGNOSIS — E55.9 VITAMIN D DEFICIENCY: ICD-10-CM

## 2021-11-30 PROCEDURE — G8753 SYS BP > OR = 140: HCPCS | Performed by: INTERNAL MEDICINE

## 2021-11-30 PROCEDURE — 99215 OFFICE O/P EST HI 40 MIN: CPT | Performed by: INTERNAL MEDICINE

## 2021-11-30 PROCEDURE — G8754 DIAS BP LESS 90: HCPCS | Performed by: INTERNAL MEDICINE

## 2021-11-30 PROCEDURE — G8536 NO DOC ELDER MAL SCRN: HCPCS | Performed by: INTERNAL MEDICINE

## 2021-11-30 PROCEDURE — G8510 SCR DEP NEG, NO PLAN REQD: HCPCS | Performed by: INTERNAL MEDICINE

## 2021-11-30 PROCEDURE — G8427 DOCREV CUR MEDS BY ELIG CLIN: HCPCS | Performed by: INTERNAL MEDICINE

## 2021-11-30 PROCEDURE — 1100F PTFALLS ASSESS-DOCD GE2>/YR: CPT | Performed by: INTERNAL MEDICINE

## 2021-11-30 PROCEDURE — 3288F FALL RISK ASSESSMENT DOCD: CPT | Performed by: INTERNAL MEDICINE

## 2021-11-30 PROCEDURE — G8419 CALC BMI OUT NRM PARAM NOF/U: HCPCS | Performed by: INTERNAL MEDICINE

## 2021-11-30 PROCEDURE — 1090F PRES/ABSN URINE INCON ASSESS: CPT | Performed by: INTERNAL MEDICINE

## 2021-11-30 NOTE — PROGRESS NOTES
Chief Complaint   Patient presents with    Arthritis     hands     1. Have you been to the ER, urgent care clinic since your last visit? Hospitalized since your last visit? No    2. Have you seen or consulted any other health care providers outside of the [address] [place] [address] since your last visit? Include any pap smears or colon screening.  Yes Where: PCP

## 2021-11-30 NOTE — PATIENT INSTRUCTIONS
1. Joints look good today, let's continue the [place] Ora daily. 2. Let me know if I can be helpful reducing the cost of the Prolia, but I do think you're getting benefit from this in terms of reducing your fracture risk, so if it's not a hardship let's try to keep this going. The alternative would be a medication like Fosamax to take once a week by mouth--this isn't as effective of a medication and can be hard on the stomach, but would be a more affordable option. 3. Repeat labs the week before Prolia. 4. Return in 5 months.

## 2021-11-30 NOTE — PROGRESS NOTES
REASON FOR VISIT:    is a 67 y.o. 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 HISTORY OF PRESENT ILLNESS      Hands are feeling better. Hasn't been taking the BC powders, not taking Tylenol. No new numbness or tingling. Takes gabapentin    Due for Prolia in January. Had $200 copay with last one. REVIEW OF SYSTEMS  Negatives as follows:  [place]:    Denies unexplained persistent fevers, weight change, chronic fatigue  HEAD/EYES:              Denies eye redness, blurry vision or sudden loss of vision, dry eyes, HA, temporal pain  ENT:                            Denies oral/nasal ulcers, recurrent sinus infections, dry mouth  RESPIRATORY:         No pleuritic pain, history of pleural effusions, hemoptysis, exertional dyspnea  CARDIOVASCULAR:             Denies chest pain, history of pericardial effusions  GASTRO:                    Denies heartburn, esophageal dysmotility, abdominal pain, nausea, vomiting, diarrhea, blood in the stool  HEMATOLOGIC:        No easy bruising, purpura, swollen lymph nodes  SKIN:                           Denies alopecia, ulcers, nodules, sun sensitivity, unexplained persistent rash   VASCULAR:                Denies edema, cyanosis, raynaud phenomenon  NEUROLOGIC:           +paresthesias in hands and feet Denies specific muscle weakness  PSYCHIATRIC:           No sleep disturbance / snoring, depression, anxiety  MSK:                           +extended stiffness, persistent joint pain      Review of systems is as above and is otherwise negative. ALLERGIES  Codeine and Sulfa (sulfonamide antibiotics)    MEDICATIONS  Current Outpatient Medications   Medication Sig    OTHER Allergy plus patch from [facility]    amLODIPine (NORVASC) 5 mg tablet Take 1 Tablet by mouth daily.  clopidogreL (PLAVIX) 75 mg tab Take 1 Tablet by mouth daily.     gabapentin (NEURONTIN) 300 mg capsule Take 2 Capsules by mouth nightly. Max Daily Amount: 600 mg.    insulin detemir U-100 (LEVEMIR) 100 unit/mL injection 25 Units by SubCUTAneous route two (2) times a day. Vials. Dx E11.4    meclizine (ANTIVERT) 12.5 mg tablet Take 1 Tablet by mouth three (3) times daily as needed for Dizziness.  metFORMIN (GLUCOPHAGE) 1,000 mg tablet Take 1 Tablet by mouth two (2) times a day.  pantoprazole (PROTONIX) 40 mg tablet Take 1 Tablet by mouth daily.  ramipriL (ALTACE) 10 mg capsule Take 1 Capsule by mouth two (2) times a day.  simvastatin (ZOCOR) 20 mg tablet Take 1 Tablet by mouth nightly.  tofacitinib 11 mg Tb24 Take 11 mg by mouth daily. Indications: rheumatoid arthritis    Ferrous Fumarate 325 mg (106 mg iron) tab Take  by mouth.  cyanocobalamin (VITAMIN B12) 500 mcg tablet Take 1 Tab by mouth daily.  cholecalciferol (VITAMIN D3) (2,000 UNITS /50 MCG) cap capsule Take 2,000 Units by mouth daily.  triamcinolone acetonide (KENALOG) 0.1 % ointment Apply  to affected area two (2) times a day. use thin layer on upper back (Patient taking differently: Apply  to affected area as needed. use thin layer on upper back)    famotidine (PEPCID) 40 mg tablet Take 40 mg by mouth daily.  diclofenac EC (VOLTAREN) 75 mg EC tablet Take 75 mg by mouth daily.  insulin aspart U-100 (NovoLOG U-100 Insulin aspart) 100 unit/mL injection by SubCUTAneous route as needed.  aspirin/caffeine (BC ARTHRITIS PO) Take  by mouth two (2) times daily as needed. (Patient not taking: Reported on 11/30/2021)     No current facility-administered medications for this visit. PAST MEDICAL HISTORY  Past Medical History:   Diagnosis Date    Chronic pain     Diabetes ([address]     Hypertension     Ovarian cyst     Removed Laproscopically       FAMILY HISTORY  Sister has rheumatoid arthritis, father had erosive osteoarthritis. SOCIAL HISTORY  She  reports that she has never smoked. She has never used smokeless tobacco. She reports that she does not drink alcohol and does not use drugs. Social History     Social History Narrative    Cosmetology school and course of Psychology   now cleans offices part-time, previously also CNA. DATA  Visit Vitals  BP (!) 189/71 (BP 1 Location: Right arm, BP Patient Position: Sitting)   Pulse 75   Temp 98.1 °F (36.7 °C)   Resp 18   Ht 5' 5\" (1.651 m)   Wt 153 lb 6.4 oz (69.6 kg)   SpO2 98%   BMI 25.53 kg/m²    Body mass index is 25.53 kg/m². No flowsheet data found. General:  The patient is well developed, well nourished, alert, and in no apparent distress. Eyes: Sclera are anicteric. No conjunctival injection. HEENT:  Oropharynx is clear. No oral ulcers. Adequate salivary pooling. No cervical or supraclavicular lymphadenopathy. Lungs:  Clear to auscultation bilaterally, without wheeze or stridor. Normal respiratory effort. Cor:  Regular rate and rhythm. No murmur rub or gallop. Abdomen: Soft, non-tender, without hepatomegaly or masses. Extremities: No calf tenderness or edema. Warm and well perfused. Skin:  No significant abnormalities. No psoriaform lesions, no tophi. Neuro: +SLR bilaterally, normal gait. No LE muscle wasting, normal reflexes. Musculoskeletal:    A comprehensive musculoskeletal exam was performed for all joints of each upper and lower extremity and assessed for swelling, tenderness and range of motion. Results are documented as below:  Marked Cleo nodes globally right hand with persistent resolution of associated tenderness. Still no synovitis currently. Pan-flexion deformities of right hand; index PIP fused, largely stable ~15deg flexion in other PIPs. Bilateral knee crepitus. No evidence of synovitis in the small joints of the hands, wrists, shoulders, elbows, hips, knees or ankles.      Labs:  10/13/21: WBC 8.1, Hgb 10.1, Plt 313; Cr 1.47, Ca 10.4, LFTs WNL; , HDL 46, ; A1c 8.2  7/14/21: CBC WNL; ESR 21; Cr 1.2, LFTs WNL; TSH WNL; iPTH WNL; CRP 16mg/L  21: Cr 1.38, CMP WNL, CBC WNL (Hgb 10.5)  20: CBC WNL, Cr 1.1, Hep B cAb neg, sAb neg, sAg neg; Hep C Ab neg; QFN gold neg  20:     Imagin21 DXA:  This patient is osteoporotic using the World Health Organization criteria  10 year probability of major osteoporotic fracture: 22.3%  10 year probability of hip fracture: 8.7%    21 XR R hand: FINDINGS: Three views of the right hand demonstrate interphalangeal joints  demonstrate joint space narrowing. DIP joints degenerative joint space  narrowing. Carpal metacarpal joint space narrowing. Radiocarpal joint space  narrowing. . There are small erosions present. There is ulnar deviation at the  DIP joints. IMPRESSION  There is a combination of osteoarthritis and erosive arthritis. XR L hand: Moderate to severe osteoarthritis. .    17 XR Lspine (report only): Impression: Plain radiographs demonstrate 100% loss of disc height at L3-4, L4-5, and L5-S1 with resultant foraminal stenosis. ASSESSMENT AND PLAN  Ms. Willa Rose is a 67 y.o. 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 Inflammatory arthritis still in clinical remission on tofacitinib, and requiring less pain medications. We reviewed the potential for increased stroke or heart attack risk with Alessandra Murry, but given good control over cholesterol currently, her reduced pain medication use, and her maintained function, she is comfortable that benefit outweighs risk. 1. Seronegative rheumatoid arthritis ([facility] [address]  - Cont Xeljanz daily  - C REACTIVE PROTEIN, QT; Future  - CBC WITH AUTOMATED DIFF; Future  - METABOLIC PANEL, COMPREHENSIVE; Future  - SED RATE (ESR); Future  - VITAMIN D, 25 HYDROXY; Future    2. Age-related osteoporosis without current pathological fracture  - Prolia scheduled for January  - CBC WITH AUTOMATED DIFF;  Future  - METABOLIC PANEL, COMPREHENSIVE; Future  - VITAMIN D, 25 HYDROXY; Future    3. Ongoing use of possibly toxic medication  - CBC WITH AUTOMATED DIFF; Future  - METABOLIC PANEL, COMPREHENSIVE; Future    4. Vitamin D deficiency  - VITAMIN D, 25 HYDROXY; Future         Patient Instructions   1. Joints look good today, let's continue the Khadar mawr daily. 2. Let me know if I can be helpful reducing the cost of the Prolia, but I do think you're getting benefit from this in terms of reducing your fracture risk, so if it's not a hardship let's try to keep this going. The alternative would be a medication like Fosamax to take once a week by mouth--this isn't as effective of a medication and can be hard on the stomach, but would be a more affordable option. 3. Repeat labs the week before Prolia. 4. Return in 5 months. Orders Placed This Encounter    C REACTIVE PROTEIN, QT    CBC WITH AUTOMATED DIFF    METABOLIC PANEL, COMPREHENSIVE    SED RATE (ESR)    VITAMIN D, 25 HYDROXY       Medications: I am having Elder Cardenas maintain her famotidine, diclofenac EC, insulin aspart U-100, triamcinolone acetonide, cyanocobalamin, cholecalciferol, Ferrous Fumarate, tofacitinib, amLODIPine, clopidogreL, gabapentin, insulin detemir U-100, meclizine, metFORMIN, pantoprazole, ramipriL, simvastatin, OTHER, and aspirin/caffeine (BC ARTHRITIS PO).     Follow up: 5 months    Face to face time: 25 minutes  Note preparation and records review day of service: 20 minutes  Total provider time day of service: 45 minutes      Tami Murphy MD    Adult Rheumatology   [address] [facility] [place], [address] [place] [address]   Phone [phone]  Fax [phone]

## 2021-12-09 ENCOUNTER — TELEPHONE (OUTPATIENT)
Dept: RHEUMATOLOGY | Age: 76
End: 2021-12-09

## 2021-12-09 NOTE — TELEPHONE ENCOUNTER
----- Message from Amador Garay sent at 12/9/2021  2:55 PM EST -----  Regarding: Dr Ming Nash // Guzman Maynard follow up for [place] had called regarding the financial assistance application that was sent back to them via fax. They are requesting a date be placed next to the physician's signature before re-faxing the paperwork. The rep also mentioned that it has been past 30 days since the initial request, so without the date the patient's assistance case will be closed out. The fax number provided was [phone], and the phone number is [phone].

## 2022-01-22 LAB
25(OH)D3+25(OH)D2 SERPL-MCNC: 24.6 NG/ML (ref 30–100)
ALBUMIN SERPL-MCNC: 4.1 G/DL (ref 3.7–4.7)
ALBUMIN/GLOB SERPL: 1.5 {RATIO} (ref 1.2–2.2)
ALP SERPL-CCNC: 87 IU/L (ref 44–121)
ALT SERPL-CCNC: 25 IU/L (ref 0–32)
AST SERPL-CCNC: 27 IU/L (ref 0–40)
BASOPHILS # BLD AUTO: 0 X10E3/UL (ref 0–0.2)
BASOPHILS NFR BLD AUTO: 1 %
BILIRUB SERPL-MCNC: <0.2 MG/DL (ref 0–1.2)
BUN SERPL-MCNC: 16 MG/DL (ref 8–27)
BUN/CREAT SERPL: 14 (ref 12–28)
CALCIUM SERPL-MCNC: 9.7 MG/DL (ref 8.7–10.3)
CHLORIDE SERPL-SCNC: 102 MMOL/L (ref 96–106)
CO2 SERPL-SCNC: 26 MMOL/L (ref 20–29)
CREAT SERPL-MCNC: 1.13 MG/DL (ref 0.57–1)
CRP SERPL-MCNC: 3 MG/L (ref 0–10)
EOSINOPHIL # BLD AUTO: 0 X10E3/UL (ref 0–0.4)
EOSINOPHIL NFR BLD AUTO: 1 %
ERYTHROCYTE [DISTWIDTH] IN BLOOD BY AUTOMATED COUNT: 15.7 % (ref 11.7–15.4)
ERYTHROCYTE [SEDIMENTATION RATE] IN BLOOD BY WESTERGREN METHOD: 20 MM/HR (ref 0–40)
GLOBULIN SER CALC-MCNC: 2.8 G/DL (ref 1.5–4.5)
GLUCOSE SERPL-MCNC: 186 MG/DL (ref 65–99)
HCT VFR BLD AUTO: 34.5 % (ref 34–46.6)
HGB BLD-MCNC: 10.8 G/DL (ref 11.1–15.9)
IMM GRANULOCYTES # BLD AUTO: 0 X10E3/UL (ref 0–0.1)
IMM GRANULOCYTES NFR BLD AUTO: 1 %
LYMPHOCYTES # BLD AUTO: 1.4 X10E3/UL (ref 0.7–3.1)
LYMPHOCYTES NFR BLD AUTO: 24 %
MCH RBC QN AUTO: 27.1 PG (ref 26.6–33)
MCHC RBC AUTO-ENTMCNC: 31.3 G/DL (ref 31.5–35.7)
MCV RBC AUTO: 87 FL (ref 79–97)
MONOCYTES # BLD AUTO: 0.4 X10E3/UL (ref 0.1–0.9)
MONOCYTES NFR BLD AUTO: 7 %
NEUTROPHILS # BLD AUTO: 4.1 X10E3/UL (ref 1.4–7)
NEUTROPHILS NFR BLD AUTO: 66 %
PLATELET # BLD AUTO: 314 X10E3/UL (ref 150–450)
POTASSIUM SERPL-SCNC: 5.2 MMOL/L (ref 3.5–5.2)
PROT SERPL-MCNC: 6.9 G/DL (ref 6–8.5)
RBC # BLD AUTO: 3.98 X10E6/UL (ref 3.77–5.28)
SODIUM SERPL-SCNC: 140 MMOL/L (ref 134–144)
WBC # BLD AUTO: 6 X10E3/UL (ref 3.4–10.8)

## 2022-01-24 ENCOUNTER — TELEPHONE (OUTPATIENT)
Dept: RHEUMATOLOGY | Age: 77
End: 2022-01-24

## 2022-01-24 ENCOUNTER — OFFICE VISIT (OUTPATIENT)
Dept: RHEUMATOLOGY | Age: 77
End: 2022-01-24
Payer: MEDICARE

## 2022-01-24 VITALS
SYSTOLIC BLOOD PRESSURE: 171 MMHG | TEMPERATURE: 97 F | RESPIRATION RATE: 16 BRPM | DIASTOLIC BLOOD PRESSURE: 64 MMHG | HEART RATE: 80 BPM | OXYGEN SATURATION: 96 %

## 2022-01-24 DIAGNOSIS — M81.0 OSTEOPOROSIS, UNSPECIFIED OSTEOPOROSIS TYPE, UNSPECIFIED PATHOLOGICAL FRACTURE PRESENCE: Primary | ICD-10-CM

## 2022-01-24 PROCEDURE — 96372 THER/PROPH/DIAG INJ SC/IM: CPT

## 2022-01-24 RX ORDER — ACETAMINOPHEN 325 MG/1
650 TABLET ORAL AS NEEDED
Status: CANCELLED
Start: 2022-07-18

## 2022-01-24 RX ORDER — ALBUTEROL SULFATE 0.83 MG/ML
2.5 SOLUTION RESPIRATORY (INHALATION) AS NEEDED
Status: CANCELLED
Start: 2022-07-18

## 2022-01-24 RX ORDER — EPINEPHRINE 1 MG/ML
0.3 INJECTION, SOLUTION, CONCENTRATE INTRAVENOUS AS NEEDED
Status: CANCELLED | OUTPATIENT
Start: 2022-07-18

## 2022-01-24 RX ORDER — DIPHENHYDRAMINE HYDROCHLORIDE 50 MG/ML
25 INJECTION, SOLUTION INTRAMUSCULAR; INTRAVENOUS AS NEEDED
Status: CANCELLED
Start: 2022-07-18

## 2022-01-24 RX ORDER — ONDANSETRON 2 MG/ML
8 INJECTION INTRAMUSCULAR; INTRAVENOUS AS NEEDED
Status: CANCELLED | OUTPATIENT
Start: 2022-07-18

## 2022-01-24 RX ORDER — HYDROCORTISONE SODIUM SUCCINATE 100 MG/2ML
100 INJECTION, POWDER, FOR SOLUTION INTRAMUSCULAR; INTRAVENOUS AS NEEDED
Status: CANCELLED | OUTPATIENT
Start: 2022-07-18

## 2022-01-24 RX ORDER — DIPHENHYDRAMINE HYDROCHLORIDE 50 MG/ML
50 INJECTION, SOLUTION INTRAMUSCULAR; INTRAVENOUS AS NEEDED
Status: CANCELLED
Start: 2022-07-18

## 2022-01-24 NOTE — TELEPHONE ENCOUNTER
----- Message from Rigoberto Rivera, RN sent at 1/24/2022 10:45 AM EST -----  Regarding: FYI Patient ooking for closer infusion center  [address] [place] [address],     Today at her appointment, she said her daughter is going to start looking for a closer option for her Prolia injections. They travel >1 hr to get here and wanted to know if there was something closer to her house.   Her daughter will be reaching out if she found an alternate site

## 2022-01-24 NOTE — PROGRESS NOTES
[facility] Rheumatology  OBIC Note    Date: 2022    Rheumatology OBIC COVID-19 SCREENING  The patient was asked the following questions and answered as documented below:    1. Do you have any symptoms of COVID-19? SOB, coughing, fever, or generally not feeling well? NO  2. Have you been exposed to COVID-19 recently? NO  3. Have you had any recent contact with family/friend that has a pending COVID test? NO    Name: Kathleen Caba  MRN: [id number]       : 1945  Diagnosis: Osteoporosis  Treatment: Prolia  Referring Provider: Dr. Kristopher Cobian  Supervising Provider: Dr. Kristopher Cobian    Patient [facility] to [facility] at 0800. Ms. Dionna Martines allergies reviewed and he/she agreed to receiving today's treatment. A physical assessment was performed initially and post-treatment. Pt. denies new complaints today. Ms. Gayatri Parra vitals were monitored before, during and after medication administration. Visit Vitals  BP (!) 171/64 (BP 1 Location: Right arm, BP Patient Position: Sitting)   Pulse 80   Temp 97 °F (36.1 °C)   Resp 16   SpO2 96%     Calcium Level 9.7 per [facility]    Medications given per providers orders:  Administrations This Visit     denosumab (PROLIA) injection 60 mg     Admin Date  2022 Action  Given Dose  60 mg Route  SubCUTAneous Administered By  Angelika Carias RN              Prolia to left upper posterior arm  NDC 53960-391-03  Lot # [id number]  Exp 2024     Ms. Hobbs tolerated the treatment without complaints and no medication reactions were seen while in presence of this RN. Patient provided with AVS, which included future appointments and written educational material regarding Prolia. All of the patients questions were answered and then discharged ambulatory from Rheumatology OBIC in stable condition at 0815. No future appointments.     Aileen Angela RN  2022  8:05 AM

## 2022-01-24 NOTE — TELEPHONE ENCOUNTER
Spoke with patient' daughter. advised her to speak with PCP to see if they will give it or call insurance to see what facility is close to her.

## 2022-02-24 ENCOUNTER — OFFICE VISIT (OUTPATIENT)
Dept: FAMILY MEDICINE CLINIC | Age: 77
End: 2022-02-24
Payer: MEDICARE

## 2022-02-24 VITALS
DIASTOLIC BLOOD PRESSURE: 72 MMHG | HEIGHT: 65 IN | BODY MASS INDEX: 25.33 KG/M2 | TEMPERATURE: 97.4 F | HEART RATE: 66 BPM | OXYGEN SATURATION: 99 % | WEIGHT: 152 LBS | RESPIRATION RATE: 18 BRPM | SYSTOLIC BLOOD PRESSURE: 165 MMHG

## 2022-02-24 DIAGNOSIS — Z86.73 HX OF TRANSIENT ISCHEMIC ATTACK (TIA): Chronic | ICD-10-CM

## 2022-02-24 DIAGNOSIS — H81.10 BENIGN PAROXYSMAL POSITIONAL VERTIGO, UNSPECIFIED LATERALITY: ICD-10-CM

## 2022-02-24 DIAGNOSIS — M19.90 ARTHRITIS: Chronic | ICD-10-CM

## 2022-02-24 DIAGNOSIS — G89.4 CHRONIC PAIN SYNDROME: ICD-10-CM

## 2022-02-24 DIAGNOSIS — M06.00 SERONEGATIVE RHEUMATOID ARTHRITIS (HCC): ICD-10-CM

## 2022-02-24 DIAGNOSIS — Z79.4 TYPE 2 DIABETES MELLITUS WITH STAGE 2 CHRONIC KIDNEY DISEASE, WITH LONG-TERM CURRENT USE OF INSULIN (HCC): Chronic | ICD-10-CM

## 2022-02-24 DIAGNOSIS — M25.561 ACUTE PAIN OF RIGHT KNEE: ICD-10-CM

## 2022-02-24 DIAGNOSIS — E11.40 TYPE 2 DIABETES MELLITUS WITH DIABETIC NEUROPATHY, WITHOUT LONG-TERM CURRENT USE OF INSULIN (HCC): Chronic | ICD-10-CM

## 2022-02-24 DIAGNOSIS — I10 ESSENTIAL HYPERTENSION: Chronic | ICD-10-CM

## 2022-02-24 DIAGNOSIS — N18.2 TYPE 2 DIABETES MELLITUS WITH STAGE 2 CHRONIC KIDNEY DISEASE, WITH LONG-TERM CURRENT USE OF INSULIN (HCC): Chronic | ICD-10-CM

## 2022-02-24 DIAGNOSIS — Z00.00 MEDICARE ANNUAL WELLNESS VISIT, SUBSEQUENT: Primary | ICD-10-CM

## 2022-02-24 DIAGNOSIS — E11.22 TYPE 2 DIABETES MELLITUS WITH STAGE 2 CHRONIC KIDNEY DISEASE, WITH LONG-TERM CURRENT USE OF INSULIN (HCC): Chronic | ICD-10-CM

## 2022-02-24 LAB
ALBUMIN SERPL-MCNC: 3.7 G/DL (ref 3.5–5)
ALBUMIN/GLOB SERPL: 1 {RATIO} (ref 1.1–2.2)
ALP SERPL-CCNC: 73 U/L (ref 45–117)
ALT SERPL-CCNC: 36 U/L (ref 12–78)
ANION GAP SERPL CALC-SCNC: 5 MMOL/L (ref 5–15)
AST SERPL-CCNC: 26 U/L (ref 15–37)
BILIRUB SERPL-MCNC: 0.2 MG/DL (ref 0.2–1)
BUN SERPL-MCNC: 15 MG/DL (ref 6–20)
BUN/CREAT SERPL: 12 (ref 12–20)
CALCIUM SERPL-MCNC: 9.6 MG/DL (ref 8.5–10.1)
CHLORIDE SERPL-SCNC: 103 MMOL/L (ref 97–108)
CHOLEST SERPL-MCNC: 235 MG/DL
CO2 SERPL-SCNC: 30 MMOL/L (ref 21–32)
CREAT SERPL-MCNC: 1.28 MG/DL (ref 0.55–1.02)
ERYTHROCYTE [DISTWIDTH] IN BLOOD BY AUTOMATED COUNT: 16.2 % (ref 11.5–14.5)
EST. AVERAGE GLUCOSE BLD GHB EST-MCNC: 189 MG/DL
GLOBULIN SER CALC-MCNC: 3.7 G/DL (ref 2–4)
GLUCOSE SERPL-MCNC: 116 MG/DL (ref 65–100)
HBA1C MFR BLD: 8.2 % (ref 4–5.6)
HCT VFR BLD AUTO: 35.4 % (ref 35–47)
HDLC SERPL-MCNC: 58 MG/DL
HDLC SERPL: 4.1 {RATIO} (ref 0–5)
HGB BLD-MCNC: 10.7 G/DL (ref 11.5–16)
LDLC SERPL CALC-MCNC: 119 MG/DL (ref 0–100)
MCH RBC QN AUTO: 27.9 PG (ref 26–34)
MCHC RBC AUTO-ENTMCNC: 30.2 G/DL (ref 30–36.5)
MCV RBC AUTO: 92.2 FL (ref 80–99)
NRBC # BLD: 0 K/UL (ref 0–0.01)
NRBC BLD-RTO: 0 PER 100 WBC
PLATELET # BLD AUTO: 369 K/UL (ref 150–400)
PMV BLD AUTO: 10.6 FL (ref 8.9–12.9)
POTASSIUM SERPL-SCNC: 4.8 MMOL/L (ref 3.5–5.1)
PROT SERPL-MCNC: 7.4 G/DL (ref 6.4–8.2)
RBC # BLD AUTO: 3.84 M/UL (ref 3.8–5.2)
SODIUM SERPL-SCNC: 138 MMOL/L (ref 136–145)
TRIGL SERPL-MCNC: 290 MG/DL (ref ?–150)
VLDLC SERPL CALC-MCNC: 58 MG/DL
WBC # BLD AUTO: 8.5 K/UL (ref 3.6–11)

## 2022-02-24 PROCEDURE — G8427 DOCREV CUR MEDS BY ELIG CLIN: HCPCS | Performed by: FAMILY MEDICINE

## 2022-02-24 PROCEDURE — 1101F PT FALLS ASSESS-DOCD LE1/YR: CPT | Performed by: FAMILY MEDICINE

## 2022-02-24 PROCEDURE — G8510 SCR DEP NEG, NO PLAN REQD: HCPCS | Performed by: FAMILY MEDICINE

## 2022-02-24 PROCEDURE — G8753 SYS BP > OR = 140: HCPCS | Performed by: FAMILY MEDICINE

## 2022-02-24 PROCEDURE — G8754 DIAS BP LESS 90: HCPCS | Performed by: FAMILY MEDICINE

## 2022-02-24 PROCEDURE — G0439 PPPS, SUBSEQ VISIT: HCPCS | Performed by: FAMILY MEDICINE

## 2022-02-24 PROCEDURE — G8419 CALC BMI OUT NRM PARAM NOF/U: HCPCS | Performed by: FAMILY MEDICINE

## 2022-02-24 PROCEDURE — G8536 NO DOC ELDER MAL SCRN: HCPCS | Performed by: FAMILY MEDICINE

## 2022-02-24 RX ORDER — DICLOFENAC SODIUM 10 MG/G
1 GEL TOPICAL 4 TIMES DAILY
Qty: 200 G | Refills: 1 | Status: SHIPPED | OUTPATIENT
Start: 2022-02-24

## 2022-02-24 RX ORDER — GUAIFENESIN 100 MG/5ML
81 LIQUID (ML) ORAL DAILY
COMMUNITY

## 2022-02-24 RX ORDER — MECLIZINE HCL 12.5 MG 12.5 MG/1
12.5 TABLET ORAL
Qty: 270 TABLET | Refills: 1 | Status: SHIPPED | OUTPATIENT
Start: 2022-02-24 | End: 2022-07-05

## 2022-02-24 NOTE — PROGRESS NOTES
[facility]    History of Present Illness:   Lázaro Moreau is a 67 y.o. female with history of HTN, TIA, DM, Chronic Pain, Rheumatoid Arthritis  CC: Follow up Chronic conditions  History provided by patient and Records    HPI:  Diabetes Follow up: Overall the patient feels well with good energy level. Current Medications:   Key Antihyperglycemic Medications             metFORMIN (GLUCOPHAGE) 1,000 mg tablet (Taking) Take 1 Tablet by mouth two (2) times a day. insulin aspart U-100 (NovoLOG U-100 Insulin aspart) 100 unit/mL injection (Taking) by SubCUTAneous route as needed. insulin detemir U-100 (LEVEMIR) 100 unit/mL injection 25 Units by SubCUTAneous route two (2) times a day. Vials. Dx E11.4      . Insulin dependence: YES   Frequency of home glucose testing: Daily   Blood Sugar range at home: 180-240    Last eye exam: In past 12 months. Last foot exam: This year. Polyuria, polyphagia or polydipsia: NO   Retinopathy: NO   Neuropathy SX: NO   Low blood sugar symptoms: NO   Dietary compliance: noncompliant some of the time   Medication compliance: compliant most of the time   On ASA: YES   Tobacco Use: NO   Depression: NO     Wt Readings from Last 3 Encounters:   02/24/22 152 lb (68.9 kg)   11/30/21 153 lb 6.4 oz (69.6 kg)   10/13/21 157 lb (71.2 kg)      Lab Results   Component Value Date/Time    Hemoglobin A1c 8.2 (H) 10/13/2021 11:55 AM      Lab Results   Component Value Date/Time    Microalbumin/Creat ratio (mg/g creat) 64 (H) 10/13/2021 11:55 AM    Microalbumin,urine random 20.40 10/13/2021 11:55 AM      Lab Results   Component Value Date/Time    Creatinine 1.13 (H) 01/21/2022 12:00 AM      Hypertension Follow up:  Currently Taking:   Key CAD CHF Meds             amLODIPine (NORVASC) 5 mg tablet (Taking) Take 1 Tablet by mouth daily. clopidogreL (PLAVIX) 75 mg tab (Taking) Take 1 Tablet by mouth daily.     ramipriL (ALTACE) 10 mg capsule (Taking) Take 1 Capsule by mouth two (2) times a day. simvastatin (ZOCOR) 20 mg tablet (Taking) Take 1 Tablet by mouth nightly. aspirin (Louann Chewable Aspirin) 81 mg chewable tablet Take 81 mg by mouth daily. aspirin/caffeine (BC ARTHRITIS PO) Take  by mouth two (2) times daily as needed. The patient reports:  taking medications as instructed, no medication side effects noted, no TIA's, no chest pain on exertion, no dyspnea on exertion, no swelling of ankles, no orthostatic dizziness or lightheadedness, no orthopnea or paroxysmal nocturnal dyspnea. BP Readings from Last 3 Encounters:   02/24/22 (!) 179/78   01/24/22 (!) 171/64   11/30/21 (!) 189/71      Knee Pain: Pain in the right knee for 1 week that is improving for the last 2 days with Icy-hot patches. Patient denies any trauma. Pain was constant with rest and activity. Rheumatoid Arthritis: See's Dr Junito Pappas for management. History of TIA: Stable at this time, On Plavix     Health Maintenance  Health Maintenance Due   Topic Date Due    Eye Exam Retinal or Dilated  Never done    DTaP/Tdap/Td series (1 - Tdap) Never done    Medicare Yearly Exam  Never done    Flu Vaccine (1) 09/01/2021    COVID-19 Vaccine (3 - Booster for Waneta Dunnings series) 10/19/2021       Past Medical, Family, and Social History:     Current Outpatient Medications on File Prior to Visit   Medication Sig Dispense Refill    traMADoL (ULTRAM) 50 mg tablet Take 1 Tablet by mouth every eight (8) hours as needed for Pain for up to 30 days. Max Daily Amount: 150 mg. APPT REQUIRED FOR REFILLS 90 Tablet 0    meclizine (ANTIVERT) 12.5 mg tablet TAKE 1 TABLET 3 TIMES A DAYAS NEEDED FOR DIZZINESS 90 Tablet 0    gabapentin (NEURONTIN) 300 mg capsule Take 2 Capsules by mouth nightly. Max Daily Amount: 600 mg. APPT REQUIRED FOR REFILLS 180 Capsule 0    OTHER Allergy plus patch from PatchMD      amLODIPine (NORVASC) 5 mg tablet Take 1 Tablet by mouth daily.  90 Tablet 1    clopidogreL (PLAVIX) 75 mg tab Take 1 Tablet by mouth daily. 90 Tablet 1    metFORMIN (GLUCOPHAGE) 1,000 mg tablet Take 1 Tablet by mouth two (2) times a day. 180 Tablet 1    pantoprazole (PROTONIX) 40 mg tablet Take 1 Tablet by mouth daily. 90 Tablet 1    ramipriL (ALTACE) 10 mg capsule Take 1 Capsule by mouth two (2) times a day. 180 Capsule 1    simvastatin (ZOCOR) 20 mg tablet Take 1 Tablet by mouth nightly. 90 Tablet 1    tofacitinib 11 mg Tb24 Take 11 mg by mouth daily. Indications: rheumatoid arthritis 30 Tab 0    cyanocobalamin (VITAMIN B12) 500 mcg tablet Take 1 Tab by mouth daily. 90 Tab 1    cholecalciferol (VITAMIN D3) (2,000 UNITS /50 MCG) cap capsule Take 2,000 Units by mouth daily. 90 Cap 1    triamcinolone acetonide (KENALOG) 0.1 % ointment Apply  to affected area two (2) times a day. use thin layer on upper back (Patient taking differently: Apply  to affected area as needed. use thin layer on upper back) 80 g 1    diclofenac EC (VOLTAREN) 75 mg EC tablet Take 75 mg by mouth daily.  insulin aspart U-100 (NovoLOG U-100 Insulin aspart) 100 unit/mL injection by SubCUTAneous route as needed.  aspirin (Louann Chewable Aspirin) 81 mg chewable tablet Take 81 mg by mouth daily.  aspirin/caffeine (BC ARTHRITIS PO) Take  by mouth two (2) times daily as needed. (Patient not taking: Reported on 2/24/2022)      insulin detemir U-100 (LEVEMIR) 100 unit/mL injection 25 Units by SubCUTAneous route two (2) times a day. Vials. Dx E11.4 45 mL 1    Ferrous Fumarate 325 mg (106 mg iron) tab Take  by mouth. (Patient not taking: Reported on 2/24/2022)      famotidine (PEPCID) 40 mg tablet Take 40 mg by mouth daily. (Patient not taking: Reported on 2/24/2022)       No current facility-administered medications on file prior to visit.        Patient Active Problem List   Diagnosis Code    Chronic pain G89.29    Type 2 diabetes mellitus with stage 2 chronic kidney disease, with long-term current use of insulin (HCC) E11.22, N18.2, Z79.4    Essential hypertension I10    Type 2 diabetes mellitus with diabetic neuropathy (HCC) E11.40    Hx of transient ischemic attack (TIA) Z86.73    Osteoporosis M81.0    Arthritis M19.90       Social History     Socioeconomic History    Marital status:    Tobacco Use    Smoking status: Never Smoker    Smokeless tobacco: Never Used   Substance and Sexual Activity    Alcohol use: Never    Drug use: Never   Social History Narrative    [employer] school and course of Psychology        Review of Systems   Review of Systems   Constitutional: Negative for chills and fever. Respiratory: Negative for cough. Cardiovascular: Negative for chest pain and palpitations. Gastrointestinal: Negative for nausea and vomiting. Neurological: Negative for headaches. Objective:     Visit Vitals  BP (!) 179/78 (BP 1 Location: Right arm, BP Patient Position: Sitting)   Pulse 66   Temp 97.4 °F (36.3 °C)   Resp 18   Ht 5' 5\" (1.651 m)   Wt 152 lb (68.9 kg)   SpO2 99%   BMI 25.29 kg/m²        Physical Exam  Vitals and nursing note reviewed. Constitutional:       Appearance: Normal appearance. HENT:      Head: Normocephalic and atraumatic. Cardiovascular:      Rate and Rhythm: Normal rate and regular rhythm. Pulses: Normal pulses. Heart sounds: Normal heart sounds. No murmur heard. No friction rub. Pulmonary:      Effort: Pulmonary effort is normal.      Breath sounds: Normal breath sounds. Abdominal:      General: Abdomen is flat. Bowel sounds are normal.      Palpations: Abdomen is soft. Musculoskeletal:         General: Normal range of motion. Cervical back: Normal range of motion and neck supple. Comments: Right Knee; normal   Skin:     General: Skin is warm and dry. Neurological:      Mental Status: She is alert. Pertinent Labs/Studies:      Assessment and orders:       ICD-10-CM ICD-9-CM    1.  Medicare annual wellness visit, subsequent  Z00.00 V70.0 2. Type 2 diabetes mellitus with diabetic neuropathy, without long-term current use of insulin (HCC)  E11.40 250.60 LIPID PANEL     357.2 HEMOGLOBIN A1C WITH EAG   3. Seronegative rheumatoid arthritis ([facility])  M06.00 714.0    4. Type 2 diabetes mellitus with stage 2 chronic kidney disease, with long-term current use of insulin (HCC)  E11.22 250.40     N18.2 585.2     Z79.4 V58.67    5. Essential hypertension  I10 401.9 CBC W/O DIFF      METABOLIC PANEL, COMPREHENSIVE   6. Arthritis  M19.90 716.90    7. Hx of transient ischemic attack (TIA)  Z86.73 V12.54    8. Chronic pain syndrome  G89.4 338.4 COMPLIANCE DRUG SCREEN/PRESCRIPTION MONITORING   9. Benign paroxysmal positional vertigo, unspecified laterality  H81.10 386.11 meclizine (ANTIVERT) 12.5 mg tablet   10. Acute pain of right knee  M25.561 719.46 diclofenac (VOLTAREN) 1 % gel     Diagnoses and all orders for this visit:    1. Medicare annual wellness visit, subsequent    2. Type 2 diabetes mellitus with diabetic neuropathy, without long-term current use of insulin ([facility])  -     LIPID PANEL; Future  -     HEMOGLOBIN A1C WITH EAG; Future    3. Seronegative rheumatoid arthritis ([facility] [address]): With Dr. Rupinder Sherman    4. Type 2 diabetes mellitus with stage 2 chronic kidney disease, with long-term current use of insulin ([facility]):Discussed with patient today that the goal for their diabetes is to have a HgA1C<7 and ideally as close to 6.5 as possible. We discussed diet and medications. The goal for the cholesterol LDL is less than 70 and HDL>40. Patient is aware of the need for yearly eye exams and to take care of their feet daily. Discussed with patient that blood pressure should be less than 130/80 and watching salt intake is very important. 5. Essential hypertension: Our goal is to normalize the blood pressure to decrease the risks of strokes and heart attacks. The patient is in agreement with the plan. -     CBC W/O DIFF;  Future  -     METABOLIC PANEL, COMPREHENSIVE; Future    6. Arthritis    7. Hx of transient ischemic attack (TIA)    8. Chronic pain syndrome: COmpliance Drug screen [facility] in order, Follow up in 3 months  -     COMPLIANCE DRUG SCREEN/PRESCRIPTION MONITORING; Future    9. Benign paroxysmal positional vertigo, unspecified laterality  -     meclizine (ANTIVERT) 12.5 mg tablet; Take 1 Tablet by mouth three (3) times daily as needed for Dizziness. 10. Acute pain of right knee  -     diclofenac (VOLTAREN) 1 % gel; Apply 1 g to affected area four (4) times daily. Apply to knee      Follow-up and Dispositions    · Return in 3 months (on 5/24/2022), or if symptoms worsen or fail to improve, for Chroic conditions. I have discussed the diagnosis with the patient and the intended plan as seen in the above orders. Social history, medical history, and labs were reviewed. The patient has received an after-visit summary and questions were answered concerning future plans. I have discussed medication side effects and warnings with the patient as well.     MD HOUSTON Shelton & ALONDRA WOODS [facility]  02/24/22

## 2022-02-24 NOTE — PROGRESS NOTES
1. Have you been to the ER, urgent care clinic since your last visit? Hospitalized since your last visit?no    2. Have you seen or consulted any other health care providers outside of the [address] [place] [address] since your last visit? Including any pap smears or colon screening.  no

## 2022-02-24 NOTE — PATIENT INSTRUCTIONS
Medicare Wellness Visit, Female     The best way to live healthy is to have a lifestyle where you eat a well-balanced diet, exercise regularly, limit alcohol use, and quit all forms of tobacco/nicotine, if applicable. Regular preventive services are another way to keep healthy. Preventive services (vaccines, screening tests, monitoring & exams) can help personalize your care plan, which helps you manage your own care. Screening tests can find health problems at the earliest stages, when they are easiest to treat. [facility] follows the current, evidence-based guidelines published by the [facility] Ronald Garay ([facility]) when recommending preventive services for our patients. Because we follow these guidelines, sometimes recommendations change over time as research supports it. (For example, mammograms used to be recommended annually. Even though Medicare will still pay for an annual mammogram, the newer guidelines recommend a mammogram every two years for women of average risk). Of course, you and your doctor may decide to screen more often for some diseases, based on your risk and your co-morbidities (chronic disease you are already diagnosed with). Preventive services for you include:  - Medicare offers their members a free annual wellness visit, which is time for you and your primary care provider to discuss and plan for your preventive service needs. Take advantage of this benefit every year!  -All adults over the age of 72 should receive the recommended pneumonia vaccines. Current USPSTF guidelines recommend a series of two vaccines for the best pneumonia protection.   -All adults should have a flu vaccine yearly and a tetanus vaccine every 10 years.   -All adults age 48 and older should receive the shingles vaccines (series of two vaccines).       -All adults age 38-68 who are overweight should have a diabetes screening test once every three years.   -All adults born between [birth year] and 1966 should be screened once for Hepatitis C.  -Other screening tests and preventive services for persons with diabetes include: an eye exam to screen for diabetic retinopathy, a kidney function test, a foot exam, and stricter control over your cholesterol.   -Cardiovascular screening for adults with routine risk involves an electrocardiogram (ECG) at intervals determined by your doctor.   -Colorectal cancer screenings should be done for adults age 54-65 with no increased risk factors for colorectal cancer. There are a number of acceptable methods of screening for this type of cancer. Each test has its own benefits and drawbacks. Discuss with your doctor what is most appropriate for you during your annual wellness visit. The different tests include: colonoscopy (considered the best screening method), a fecal occult blood test, a fecal DNA test, and sigmoidoscopy.    -A bone mass density test is recommended when a woman turns 65 to screen for osteoporosis. This test is only recommended one time, as a screening. Some providers will use this same test as a disease monitoring tool if you already have osteoporosis. -Breast cancer screenings are recommended every other year for women of normal risk, age 54-69.  -Cervical cancer screenings for women over age 72 are only recommended with certain risk factors.      Here is a list of your current Health Maintenance items (your personalized list of preventive services) with a due date:  Health Maintenance Due   Topic Date Due    Eye Exam  Never done    DTaP/Tdap/Td  (1 - Tdap) Never done    Yearly Flu Vaccine (1) 09/01/2021    COVID-19 Vaccine (3 - Booster for Otis Scrape series) 10/19/2021

## 2022-02-24 NOTE — PROGRESS NOTES
This is the Subsequent Medicare Annual Wellness Exam, performed 12 months or more after the Initial AWV or the last Subsequent AWV    I have reviewed the patient's medical history in detail and updated the computerized patient record. Assessment/Plan   Education and counseling provided:  Are appropriate based on today's review and evaluation    1. Type 2 diabetes mellitus with diabetic neuropathy, without long-term current use of insulin ([facility])  -     LIPID PANEL; Future  -     HEMOGLOBIN A1C WITH EAG; Future  2. Seronegative rheumatoid arthritis ([facility] [address]  3. Type 2 diabetes mellitus with stage 2 chronic kidney disease, with long-term current use of insulin ([facility] [address]  4. Essential hypertension  -     CBC W/O DIFF; Future  -     METABOLIC PANEL, COMPREHENSIVE; Future  5. Arthritis  6. Hx of transient ischemic attack (TIA)  7. Chronic pain syndrome  -     COMPLIANCE DRUG SCREEN/PRESCRIPTION MONITORING; Future  8. Benign paroxysmal positional vertigo, unspecified laterality  -     meclizine (ANTIVERT) 12.5 mg tablet; Take 1 Tablet by mouth three (3) times daily as needed for Dizziness., Normal, Disp-270 Tablet, R-1  9. Acute pain of right knee  -     diclofenac (VOLTAREN) 1 % gel; Apply 1 g to affected area four (4) times daily. Apply to knee, Normal, Disp-200 g, R-1  10.  Medicare annual wellness visit, subsequent       Depression Risk Factor Screening     3 most recent PHQ Screens 2/24/2022   Little interest or pleasure in doing things Not at all   Feeling down, depressed, irritable, or hopeless Not at all   Total Score PHQ 2 0       Alcohol & Drug Abuse Risk Screen    Do you average more than 1 drink per night or more than 7 drinks a week:  No    On any one occasion in the past three months have you have had more than 3 drinks containing alcohol:  No         Opioid Risk: (Low risk score <55, High risk score ?55)  Opioid risk score: 8      Click here to complete the Controlled Substance Monitoring SmartForm    Last PDMP Amadeo as Reviewed:  Review User Review Instant Review Result              Functional Ability and Level of Safety    Hearing: Hearing is good. Activities of Daily Living: The home contains: grab bars  Patient does total self care      Ambulation: with no difficulty     Fall Risk:  Fall Risk Assessment, last 12 mths 2/24/2022   Able to walk? Yes   Fall in past 12 months? 0   Do you feel unsteady? 0   Are you worried about falling 0   Number of falls in past 12 months -   Fall with injury?  -      Abuse Screen:  Patient is not abused       Cognitive Screening    Has your family/caregiver stated any concerns about your memory: no     Cognitive Screening: Normal - MMSE (Mini Mental Status Exam)    Health Maintenance Due     Health Maintenance Due   Topic Date Due    Eye Exam Retinal or Dilated  Never done    DTaP/Tdap/Td series (1 - Tdap) Never done    Flu Vaccine (1) 09/01/2021    COVID-19 Vaccine (3 - Booster for Moderna series) 10/19/2021       Patient Care Team   Patient Care Team:  Rosangela Monson MD as PCP - General (Family Medicine)  Rosangela Monson MD as PCP - [address] [place] [address] Dr Metz Provider    History     Patient Active Problem List   Diagnosis Code    Chronic pain G89.29    Type 2 diabetes mellitus with stage 2 chronic kidney disease, with long-term current use of insulin ([address] E11.22, N18.2, Z79.4    Essential hypertension I10    Type 2 diabetes mellitus with diabetic neuropathy ([address] E11.40    Hx of transient ischemic attack (TIA) Z86.73    Osteoporosis M81.0    Arthritis M19.90    Seronegative rheumatoid arthritis ([address] M06.00     Past Medical History:   Diagnosis Date    Chronic pain     Diabetes ([address]     Hypertension     Ovarian cyst     Removed Laproscopically      Past Surgical History:   Procedure Laterality Date    HX CHOLECYSTECTOMY      HX GYN      Hysterectomy, OOphrectomy and salpingoectomy in 2020    HX ORTHOPAEDIC      HX UROLOGICAL       Current Outpatient Medications   Medication Sig Dispense Refill    meclizine (ANTIVERT) 12.5 mg tablet Take 1 Tablet by mouth three (3) times daily as needed for Dizziness. 270 Tablet 1    diclofenac (VOLTAREN) 1 % gel Apply 1 g to affected area four (4) times daily. Apply to knee 200 g 1    traMADoL (ULTRAM) 50 mg tablet Take 1 Tablet by mouth every eight (8) hours as needed for Pain for up to 30 days. Max Daily Amount: 150 mg. APPT REQUIRED FOR REFILLS 90 Tablet 0    gabapentin (NEURONTIN) 300 mg capsule Take 2 Capsules by mouth nightly. Max Daily Amount: 600 mg. APPT REQUIRED FOR REFILLS 180 Capsule 0    OTHER Allergy plus patch from Patch[employer]      amLODIPine (NORVASC) 5 mg tablet Take 1 Tablet by mouth daily. 90 Tablet 1    clopidogreL (PLAVIX) 75 mg tab Take 1 Tablet by mouth daily. 90 Tablet 1    metFORMIN (GLUCOPHAGE) 1,000 mg tablet Take 1 Tablet by mouth two (2) times a day. 180 Tablet 1    pantoprazole (PROTONIX) 40 mg tablet Take 1 Tablet by mouth daily. 90 Tablet 1    ramipriL (ALTACE) 10 mg capsule Take 1 Capsule by mouth two (2) times a day. 180 Capsule 1    simvastatin (ZOCOR) 20 mg tablet Take 1 Tablet by mouth nightly. 90 Tablet 1    tofacitinib 11 mg Tb24 Take 11 mg by mouth daily. Indications: rheumatoid arthritis 30 Tab 0    cyanocobalamin (VITAMIN B12) 500 mcg tablet Take 1 Tab by mouth daily. 90 Tab 1    cholecalciferol (VITAMIN D3) (2,000 UNITS /50 MCG) cap capsule Take 2,000 Units by mouth daily. 90 Cap 1    triamcinolone acetonide (KENALOG) 0.1 % ointment Apply  to affected area two (2) times a day. use thin layer on upper back (Patient taking differently: Apply  to affected area as needed. use thin layer on upper back) 80 g 1    diclofenac EC (VOLTAREN) 75 mg EC tablet Take 75 mg by mouth daily.  insulin aspart U-100 (NovoLOG U-100 Insulin aspart) 100 unit/mL injection by SubCUTAneous route as needed.  aspirin (Louann Chewable Aspirin) 81 mg chewable tablet Take 81 mg by mouth daily.  aspirin/caffeine (BC ARTHRITIS PO) Take  by mouth two (2) times daily as needed. (Patient not taking: Reported on 2/24/2022)      insulin detemir U-100 (LEVEMIR) 100 unit/mL injection 25 Units by SubCUTAneous route two (2) times a day. Vials. Dx E11.4 45 mL 1    Ferrous Fumarate 325 mg (106 mg iron) tab Take  by mouth. (Patient not taking: Reported on 2/24/2022)      famotidine (PEPCID) 40 mg tablet Take 40 mg by mouth daily. (Patient not taking: Reported on 2/24/2022)       Allergies   Allergen Reactions    Codeine Other (comments)    Sulfa (Sulfonamide Antibiotics) Other (comments)       History reviewed. No pertinent family history.   Social History     Tobacco Use    Smoking status: Never Smoker    Smokeless tobacco: Never Used   Substance Use Topics    Alcohol use: Never         Blanquita Perez MD

## 2022-02-24 NOTE — ACP (ADVANCE CARE PLANNING)
Advance Care Planning     General Advance Care Planning (ACP) Conversation      Date of Conversation: 2/24/2022  Conducted with: Patient with Decision Making Capacity    Healthcare Decision Maker:   No healthcare decision makers have been documented. Click here to complete [address] including selection of the Healthcare Decision Maker Relationship (ie \"Primary\")    Today we documented Decision Maker(s). The patient will provide ACP documents.     Content/Action Overview:   Has NO ACP documents/care preferences - requested patient complete ACP documents  Topics discussed: treatment goals       Length of Voluntary ACP Conversation in minutes:  <16 minutes (Non-Billable)    Christa Patton MD

## 2022-03-08 LAB — DRUGS UR: NORMAL

## 2022-03-18 PROBLEM — M19.90 ARTHRITIS: Status: ACTIVE | Noted: 2021-10-13

## 2022-03-18 PROBLEM — Z86.73 HX OF TRANSIENT ISCHEMIC ATTACK (TIA): Status: ACTIVE | Noted: 2021-04-15

## 2022-03-19 PROBLEM — M06.00 SERONEGATIVE RHEUMATOID ARTHRITIS (HCC): Status: ACTIVE | Noted: 2022-02-24

## 2022-03-19 PROBLEM — M81.0 OSTEOPOROSIS: Status: ACTIVE | Noted: 2021-07-19

## 2022-03-20 PROBLEM — E11.40 TYPE 2 DIABETES MELLITUS WITH DIABETIC NEUROPATHY (HCC): Status: ACTIVE | Noted: 2020-07-24

## 2022-04-13 ENCOUNTER — PATIENT MESSAGE (OUTPATIENT)
Dept: FAMILY MEDICINE CLINIC | Age: 77
End: 2022-04-13

## 2022-04-13 DIAGNOSIS — M79.2 NEUROPATHIC PAIN: ICD-10-CM

## 2022-04-15 RX ORDER — TRAMADOL HYDROCHLORIDE 50 MG/1
50 TABLET ORAL
Qty: 270 TABLET | Refills: 0 | Status: SHIPPED | OUTPATIENT
Start: 2022-04-15 | End: 2022-07-05

## 2022-04-15 NOTE — TELEPHONE ENCOUNTER
From: Bud Richmond  To: Main Strange MD  Sent: 4/13/2022 7:12 PM EDT  Subject: Tramadol 90 day supply    Demetra Henry. .. I hope you are well!!  Can you please send a new script to mom's mail order pharmacy for a 90 day supply of her Tramadol? She needs a refill but the only script they have is a 30 day supply.   Thank you and have a Happy Easter!! :)

## 2022-05-19 DIAGNOSIS — M06.00 SERONEGATIVE RHEUMATOID ARTHRITIS (HCC): ICD-10-CM

## 2022-05-20 DIAGNOSIS — Z79.899 ONGOING USE OF POSSIBLY TOXIC MEDICATION: ICD-10-CM

## 2022-05-20 DIAGNOSIS — M06.00 SERONEGATIVE RHEUMATOID ARTHRITIS (HCC): Primary | ICD-10-CM

## 2022-05-20 RX ORDER — TOFACITINIB 11 MG/1
TABLET, FILM COATED, EXTENDED RELEASE ORAL
Qty: 90 TABLET | Refills: 0 | Status: SHIPPED | OUTPATIENT
Start: 2022-05-20

## 2022-05-20 NOTE — TELEPHONE ENCOUNTER
Please reach out to patient to have her update labs and schedule followup visit, refilled for the next 90 days but will not extend beyond this until these are done

## 2022-05-23 NOTE — TELEPHONE ENCOUNTER
Patient's daughter informed rx filled for 90 days. No additional refills will be granted until labs and appt is scheduled.  Daughter states patient will be going out of town and won't return until July 8th Appt scheduled for August 15th

## 2022-07-11 ENCOUNTER — OFFICE VISIT (OUTPATIENT)
Dept: FAMILY MEDICINE CLINIC | Age: 77
End: 2022-07-11
Payer: MEDICARE

## 2022-07-11 VITALS
RESPIRATION RATE: 20 BRPM | WEIGHT: 155 LBS | OXYGEN SATURATION: 99 % | SYSTOLIC BLOOD PRESSURE: 174 MMHG | BODY MASS INDEX: 25.83 KG/M2 | DIASTOLIC BLOOD PRESSURE: 62 MMHG | HEIGHT: 65 IN | TEMPERATURE: 98.6 F | HEART RATE: 75 BPM

## 2022-07-11 DIAGNOSIS — E11.40 TYPE 2 DIABETES MELLITUS WITH DIABETIC NEUROPATHY, WITHOUT LONG-TERM CURRENT USE OF INSULIN (HCC): Chronic | ICD-10-CM

## 2022-07-11 DIAGNOSIS — N18.30 CKD STAGE 3 DUE TO TYPE 2 DIABETES MELLITUS (HCC): ICD-10-CM

## 2022-07-11 DIAGNOSIS — G89.29 OTHER CHRONIC PAIN: ICD-10-CM

## 2022-07-11 DIAGNOSIS — E11.22 CKD STAGE 3 DUE TO TYPE 2 DIABETES MELLITUS (HCC): ICD-10-CM

## 2022-07-11 DIAGNOSIS — E53.8 B12 DEFICIENCY: ICD-10-CM

## 2022-07-11 DIAGNOSIS — E55.9 VITAMIN D DEFICIENCY: ICD-10-CM

## 2022-07-11 DIAGNOSIS — M79.605 PAIN IN BOTH LOWER EXTREMITIES: ICD-10-CM

## 2022-07-11 DIAGNOSIS — K21.9 GASTROESOPHAGEAL REFLUX DISEASE WITHOUT ESOPHAGITIS: ICD-10-CM

## 2022-07-11 DIAGNOSIS — M79.604 PAIN IN BOTH LOWER EXTREMITIES: ICD-10-CM

## 2022-07-11 DIAGNOSIS — Z79.4 TYPE 2 DIABETES MELLITUS WITH STAGE 2 CHRONIC KIDNEY DISEASE, WITH LONG-TERM CURRENT USE OF INSULIN (HCC): Primary | ICD-10-CM

## 2022-07-11 DIAGNOSIS — I10 ESSENTIAL HYPERTENSION: ICD-10-CM

## 2022-07-11 DIAGNOSIS — N18.2 TYPE 2 DIABETES MELLITUS WITH STAGE 2 CHRONIC KIDNEY DISEASE, WITH LONG-TERM CURRENT USE OF INSULIN (HCC): Primary | ICD-10-CM

## 2022-07-11 DIAGNOSIS — E11.22 TYPE 2 DIABETES MELLITUS WITH STAGE 2 CHRONIC KIDNEY DISEASE, WITH LONG-TERM CURRENT USE OF INSULIN (HCC): Primary | ICD-10-CM

## 2022-07-11 DIAGNOSIS — M06.00 SERONEGATIVE RHEUMATOID ARTHRITIS (HCC): ICD-10-CM

## 2022-07-11 DIAGNOSIS — I10 WHITE COAT SYNDROME WITH DIAGNOSIS OF HYPERTENSION: ICD-10-CM

## 2022-07-11 PROCEDURE — G8754 DIAS BP LESS 90: HCPCS | Performed by: FAMILY MEDICINE

## 2022-07-11 PROCEDURE — 99214 OFFICE O/P EST MOD 30 MIN: CPT | Performed by: FAMILY MEDICINE

## 2022-07-11 PROCEDURE — 3052F HG A1C>EQUAL 8.0%<EQUAL 9.0%: CPT | Performed by: FAMILY MEDICINE

## 2022-07-11 PROCEDURE — 1090F PRES/ABSN URINE INCON ASSESS: CPT | Performed by: FAMILY MEDICINE

## 2022-07-11 PROCEDURE — G8427 DOCREV CUR MEDS BY ELIG CLIN: HCPCS | Performed by: FAMILY MEDICINE

## 2022-07-11 PROCEDURE — 1123F ACP DISCUSS/DSCN MKR DOCD: CPT | Performed by: FAMILY MEDICINE

## 2022-07-11 PROCEDURE — 1101F PT FALLS ASSESS-DOCD LE1/YR: CPT | Performed by: FAMILY MEDICINE

## 2022-07-11 PROCEDURE — G8432 DEP SCR NOT DOC, RNG: HCPCS | Performed by: FAMILY MEDICINE

## 2022-07-11 PROCEDURE — G8753 SYS BP > OR = 140: HCPCS | Performed by: FAMILY MEDICINE

## 2022-07-11 PROCEDURE — G8536 NO DOC ELDER MAL SCRN: HCPCS | Performed by: FAMILY MEDICINE

## 2022-07-11 PROCEDURE — G8417 CALC BMI ABV UP PARAM F/U: HCPCS | Performed by: FAMILY MEDICINE

## 2022-07-11 NOTE — PROGRESS NOTES
[facility]    History of Present Illness:   Andrew Robert is a 67 y.o. female with history of HTN, TIA, DM, Chronic Pain, Rheumatoid Arthritis  CC: Follow up chronic Conditions  History provided by patient and Records    HPI:  Diabetes Follow up: Overall the patient feels well with good energy level. Current Medications:   Key Antihyperglycemic Medications             insulin detemir U-100 (Levemir U-100 Insulin) 100 unit/mL injection (Taking) 25 Units by SubCUTAneous route two (2) times a day. metFORMIN (GLUCOPHAGE) 1,000 mg tablet (Taking) TAKE 1 TABLET TWICE A DAY    insulin aspart U-100 (NovoLOG U-100 Insulin aspart) 100 unit/mL injection (Taking) by SubCUTAneous route as needed. .   Insulin dependence: YES   Frequency of home glucose testing: Daily   Blood Sugar range at home: Not good    Last eye exam: In past 12 months. Last foot exam: This year.    Polyuria, polyphagia or polydipsia: NO   Retinopathy: NO   Neuropathy SX: YES   Low blood sugar symptoms: NO   Dietary compliance: compliant most of the time   Medication compliance: compliant most of the time   On ASA: YES   Tobacco Use: NO   Depression: NO     Wt Readings from Last 3 Encounters:   07/11/22 155 lb (70.3 kg)   02/24/22 152 lb (68.9 kg)   11/30/21 153 lb 6.4 oz (69.6 kg)        Lab Results   Component Value Date/Time    Hemoglobin A1c 8.2 (H) 02/24/2022 09:10 AM        Lab Results   Component Value Date/Time    Microalbumin/Creat ratio (mg/g creat) 64 (H) 10/13/2021 11:55 AM    Microalbumin,urine random 20.40 10/13/2021 11:55 AM         Lab Results   Component Value Date/Time    Creatinine 1.28 (H) 02/24/2022 09:10 AM      Hypertension Follow up:  Currently Taking:   Key CAD CHF Meds             amLODIPine (NORVASC) 5 mg tablet (Taking) TAKE 1 TABLET DAILY    simvastatin (ZOCOR) 20 mg tablet (Taking) TAKE 1 TABLET NIGHTLY    clopidogreL (PLAVIX) 75 mg tab (Taking) TAKE 1 TABLET DAILY    ramipriL (ALTACE) 10 mg capsule (Taking) TAKE 1 CAPSULE TWICE DAILY    aspirin (Louann Chewable Aspirin) 81 mg chewable tablet (Taking) Take 81 mg by mouth daily. aspirin/caffeine (BC ARTHRITIS PO) Take  by mouth two (2) times daily as needed. The patient reports:  taking medications as instructed, no medication side effects noted, no TIA's, no chest pain on exertion, no dyspnea on exertion, no swelling of ankles. BP Readings from Last 3 Encounters:   07/11/22 (!) 178/50   02/24/22 (!) 165/72   01/24/22 (!) 171/64      Hypertriglyceridemia Follow up:   Cardiovascular risks for her are: hypertension  hyperlipidemia. Current Medications:  Key Antihyperlipidemia Meds             simvastatin (ZOCOR) 20 mg tablet (Taking) TAKE 1 TABLET NIGHTLY          Compliance: YES   Myalgias: NO   Fatigue: NO   Other side effects: NO     Wt Readings from Last 3 Encounters:   07/11/22 155 lb (70.3 kg)   02/24/22 152 lb (68.9 kg)   11/30/21 153 lb 6.4 oz (69.6 kg)       Lab Results   Component Value Date/Time    Cholesterol, total 235 (H) 02/24/2022 09:10 AM    HDL Cholesterol 58 02/24/2022 09:10 AM    LDL, calculated 119 (H) 02/24/2022 09:10 AM    VLDL, calculated 58 02/24/2022 09:10 AM    Triglyceride 290 (H) 02/24/2022 09:10 AM    CHOL/HDL Ratio 4.1 02/24/2022 09:10 AM      Lab Results   Component Value Date/Time    ALT (SGPT) 36 02/24/2022 09:10 AM    AST (SGOT) 26 02/24/2022 09:10 AM    Alk. phosphatase 73 02/24/2022 09:10 AM    Bilirubin, total 0.2 02/24/2022 09:10 AM      Rheumatoid Arthritis: See's Dr Tarsha Rodriguez for management, Taking Fifi Jen and is helping significantly. Chronic Pain: Taking Gabapentin and Tramadol. 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 Gabapentin has helped the fingers significantly. Noting persistent leg pains bilaterally with any walking/stairs. Has not done as much exercise.       GERD: Stable    Health Maintenance  Health Maintenance Due   Topic Date Due    Eye Exam Retinal or Dilated  Never done   Richie Rutherford DTaP/Tdap/Td series (1 - Tdap) Never done    COVID-19 Vaccine (3 - Booster for Moderna series) 10/19/2021    Pneumococcal 65+ years (2 - PCV) 10/22/2021       Past Medical, Family, and Social History:     Current Outpatient Medications on File Prior to Visit   Medication Sig Dispense Refill    insulin detemir U-100 (Levemir U-100 Insulin) 100 unit/mL injection 25 Units by SubCUTAneous route two (2) times a day. 45 mL 1    amLODIPine (NORVASC) 5 mg tablet TAKE 1 TABLET DAILY 90 Tablet 1    gabapentin (NEURONTIN) 300 mg capsule TAKE 2 CAPSULES NIGHTLY. MAXIMUM DAILY AMOUNT:      600MG. 180 Capsule 0    metFORMIN (GLUCOPHAGE) 1,000 mg tablet TAKE 1 TABLET TWICE A  Tablet 1    traMADoL (ULTRAM) 50 mg tablet Take 1 Tablet by mouth every eight (8) hours as needed for Pain for up to 90 days. Max Daily Amount: 150 mg. 270 Tablet 0    simvastatin (ZOCOR) 20 mg tablet TAKE 1 TABLET NIGHTLY 90 Tablet 1    pantoprazole (PROTONIX) 40 mg tablet TAKE 1 TABLET DAILY 90 Tablet 1    meclizine (ANTIVERT) 12.5 mg tablet TAKE 1 TABLET 3 TIMES A DAYAS NEEDED FOR DIZZINESS 90 Tablet 0    clopidogreL (PLAVIX) 75 mg tab TAKE 1 TABLET DAILY 90 Tablet 1    ramipriL (ALTACE) 10 mg capsule TAKE 1 CAPSULE TWICE DAILY 180 Capsule 1    Xeljanz XR 11 mg Tb24 Take 1 tablet by mouth daily as directed by physician 90 Tablet 0    aspirin (Louann Chewable Aspirin) 81 mg chewable tablet Take 81 mg by mouth daily.  diclofenac (VOLTAREN) 1 % gel Apply 1 g to affected area four (4) times daily. Apply to knee 200 g 1    OTHER Allergy plus patch from [facility]      cyanocobalamin (VITAMIN B12) 500 mcg tablet Take 1 Tab by mouth daily. 90 Tab 1    cholecalciferol (VITAMIN D3) (2,000 UNITS /50 MCG) cap capsule Take 2,000 Units by mouth daily. 90 Cap 1    triamcinolone acetonide (KENALOG) 0.1 % ointment Apply  to affected area two (2) times a day.  use thin layer on upper back (Patient taking differently: Apply  to affected area as needed. use thin layer on upper back) 80 g 1    diclofenac EC (VOLTAREN) 75 mg EC tablet Take 75 mg by mouth daily.  insulin aspart U-100 (NovoLOG U-100 Insulin aspart) 100 unit/mL injection by SubCUTAneous route as needed.  aspirin/caffeine (BC ARTHRITIS PO) Take  by mouth two (2) times daily as needed. (Patient not taking: Reported on 2/24/2022)      Ferrous Fumarate 325 mg (106 mg iron) tab Take  by mouth. (Patient not taking: Reported on 2/24/2022)      famotidine (PEPCID) 40 mg tablet Take 40 mg by mouth daily. (Patient not taking: Reported on 2/24/2022)       No current facility-administered medications on file prior to visit. Patient Active Problem List   Diagnosis Code    Chronic pain G89.29    Type 2 diabetes mellitus with stage 2 chronic kidney disease, with long-term current use of insulin ([facility]) E11.22, N18.2, Z79.4    Essential hypertension I10    Type 2 diabetes mellitus with diabetic neuropathy ([facility] E11.40    Hx of transient ischemic attack (TIA) Z86.73    Osteoporosis M81.0    Arthritis M19.90    Seronegative rheumatoid arthritis ([facility] M06.00    Gastroesophageal reflux disease without esophagitis K21.9    White coat syndrome with diagnosis of hypertension I10       Social History     Socioeconomic History    Marital status:    Tobacco Use    Smoking status: Never Smoker    Smokeless tobacco: Never Used   Substance and Sexual Activity    Alcohol use: Never    Drug use: Never   Social History Narrative    [employer] school and course of Psychology        Review of Systems   Review of Systems   Constitutional: Negative for chills and fever. HENT: Negative for ear discharge and ear pain. Eyes: Negative for blurred vision and double vision. Gastrointestinal: Negative for abdominal pain, nausea and vomiting. Musculoskeletal: Positive for back pain. Neurological: Negative for dizziness and headaches.        Objective:     Visit Vitals  BP (!) 178/50 (BP 1 Location: Left upper arm, BP Patient Position: Sitting, BP Cuff Size: Large adult)   Pulse 75   Temp 98.6 °F (37 °C) (Oral)   Resp 20   Ht 5' 5\" (1.651 m)   Wt 155 lb (70.3 kg)   SpO2 99%   BMI 25.79 kg/m²        Physical Exam  Vitals and nursing note reviewed. Constitutional:       Appearance: Normal appearance. HENT:      Head: Normocephalic and atraumatic. Cardiovascular:      Rate and Rhythm: Normal rate and regular rhythm. Pulses: Normal pulses. Heart sounds: Normal heart sounds. No murmur heard. No friction rub. No gallop. Pulmonary:      Effort: Pulmonary effort is normal.      Breath sounds: Normal breath sounds. Abdominal:      General: Abdomen is flat. Bowel sounds are normal.      Palpations: Abdomen is soft. Musculoskeletal:      Cervical back: Normal range of motion and neck supple. Skin:     General: Skin is warm and dry. Neurological:      Mental Status: She is alert. Pertinent Labs/Studies:      Assessment and orders:       ICD-10-CM ICD-9-CM    1. Type 2 diabetes mellitus with stage 2 chronic kidney disease, with long-term current use of insulin ([facility])  E11.22 250.40 LIPID PANEL    N18.2 585.2 HEMOGLOBIN A1C WITH EAG    Z79.4 V58.67    2. Essential hypertension  I10 401.9 CBC W/O DIFF      METABOLIC PANEL, COMPREHENSIVE   3. Other chronic pain  G89.29 338.29 COMPLIANCE DRUG SCREEN/PRESCRIPTION MONITORING   4. Seronegative rheumatoid arthritis ([facility])  M06.00 714.0    5. CKD stage 3 due to type 2 diabetes mellitus ([facility]  E11.22 250.40     N18.30 585.3    6. Gastroesophageal reflux disease without esophagitis  K21.9 530.81    7. Type 2 diabetes mellitus with diabetic neuropathy, without long-term current use of insulin ([facility])  E11.40 250.60      357.2    8. White coat syndrome with diagnosis of hypertension  I10 401.9    9. B12 deficiency  E53.8 266.2 VITAMIN B12 & FOLATE   10. Vitamin D deficiency  E55.9 268.9 VITAMIN D, 25 HYDROXY   11.  Pain in both lower extremities M79.604 729.5 ANKLE BRACHIAL INDEX    M79.605       Diagnoses and all orders for this visit:    1. Type 2 diabetes mellitus with stage 2 chronic kidney disease, with long-term current use of insulin [facility]): Discussed with patient today that the goal for their diabetes is to have a HgA1C<7 and ideally as close to 6.5 as possible. We discussed diet and medications. The goal for the cholesterol LDL is less than 70 and HDL>40. Patient is aware of the need for yearly eye exams and to take care of their feet daily. Discussed with patient that blood pressure should be less than 130/80 and watching salt intake is very important.    -     LIPID PANEL; Future  -     HEMOGLOBIN A1C WITH EAG; Future    2. Essential hypertension: Our goal is to normalize the blood pressure to decrease the risks of strokes and heart attacks. The patient is in agreement with the plan. -     CBC W/O DIFF; Future  -     METABOLIC PANEL, COMPREHENSIVE; Future    3. Other chronic pain  -     COMPLIANCE DRUG SCREEN/PRESCRIPTION MONITORING; Future    4. Seronegative rheumatoid arthritis ([facility]): Stable and improved    5. CKD stage 3 due to type 2 diabetes mellitus ([facility]    6. Gastroesophageal reflux disease without esophagitis    7. Type 2 diabetes mellitus with diabetic neuropathy, without long-term current use of insulin ([facility]    8. White coat syndrome with diagnosis of hypertension    9. B12 deficiency  -     VITAMIN B12 & FOLATE; Future    10. Vitamin D deficiency  -     VITAMIN D, 25 HYDROXY; Future    11. Pain in both lower extremities  -     ANKLE BRACHIAL INDEX; Future      Follow-up and Dispositions    · Return in about 3 months (around 10/11/2022). I have discussed the diagnosis with the patient and the intended plan as seen in the above orders. Social history, medical history, and labs were reviewed. The patient has received an after-visit summary and questions were answered concerning future plans.   I have discussed medication side effects and warnings with the patient as well.     MD [facility]  07/11/22

## 2022-07-12 LAB
25(OH)D3 SERPL-MCNC: 45.4 NG/ML (ref 30–100)
ALBUMIN SERPL-MCNC: 3.6 G/DL (ref 3.5–5)
ALBUMIN/GLOB SERPL: 1.2 {RATIO} (ref 1.1–2.2)
ALP SERPL-CCNC: 78 U/L (ref 45–117)
ALT SERPL-CCNC: 24 U/L (ref 12–78)
ANION GAP SERPL CALC-SCNC: 8 MMOL/L (ref 5–15)
AST SERPL-CCNC: 20 U/L (ref 15–37)
BILIRUB SERPL-MCNC: 0.2 MG/DL (ref 0.2–1)
BUN SERPL-MCNC: 13 MG/DL (ref 6–20)
BUN/CREAT SERPL: 8 (ref 12–20)
CALCIUM SERPL-MCNC: 9.8 MG/DL (ref 8.5–10.1)
CHLORIDE SERPL-SCNC: 107 MMOL/L (ref 97–108)
CHOLEST SERPL-MCNC: 155 MG/DL
CO2 SERPL-SCNC: 25 MMOL/L (ref 21–32)
CREAT SERPL-MCNC: 1.65 MG/DL (ref 0.55–1.02)
ERYTHROCYTE [DISTWIDTH] IN BLOOD BY AUTOMATED COUNT: 16.6 % (ref 11.5–14.5)
EST. AVERAGE GLUCOSE BLD GHB EST-MCNC: 163 MG/DL
FOLATE SERPL-MCNC: 5.5 NG/ML (ref 5–21)
GLOBULIN SER CALC-MCNC: 3.1 G/DL (ref 2–4)
GLUCOSE SERPL-MCNC: 106 MG/DL (ref 65–100)
HBA1C MFR BLD: 7.3 % (ref 4–5.6)
HCT VFR BLD AUTO: 33.2 % (ref 35–47)
HDLC SERPL-MCNC: 55 MG/DL
HDLC SERPL: 2.8 {RATIO} (ref 0–5)
HGB BLD-MCNC: 10.1 G/DL (ref 11.5–16)
LDLC SERPL CALC-MCNC: 56.2 MG/DL (ref 0–100)
MCH RBC QN AUTO: 28.9 PG (ref 26–34)
MCHC RBC AUTO-ENTMCNC: 30.4 G/DL (ref 30–36.5)
MCV RBC AUTO: 95.1 FL (ref 80–99)
NRBC # BLD: 0 K/UL (ref 0–0.01)
NRBC BLD-RTO: 0 PER 100 WBC
PLATELET # BLD AUTO: 386 K/UL (ref 150–400)
PMV BLD AUTO: 10.1 FL (ref 8.9–12.9)
POTASSIUM SERPL-SCNC: 5.5 MMOL/L (ref 3.5–5.1)
PROT SERPL-MCNC: 6.7 G/DL (ref 6.4–8.2)
RBC # BLD AUTO: 3.49 M/UL (ref 3.8–5.2)
SODIUM SERPL-SCNC: 140 MMOL/L (ref 136–145)
TRIGL SERPL-MCNC: 219 MG/DL (ref ?–150)
VIT B12 SERPL-MCNC: 724 PG/ML (ref 193–986)
VLDLC SERPL CALC-MCNC: 43.8 MG/DL
WBC # BLD AUTO: 6.1 K/UL (ref 3.6–11)

## 2022-07-13 LAB
ALBUMIN SERPL-MCNC: 4.3 G/DL (ref 3.7–4.7)
ALBUMIN/GLOB SERPL: 1.8 {RATIO} (ref 1.2–2.2)
ALP SERPL-CCNC: 75 IU/L (ref 44–121)
ALT SERPL-CCNC: 17 IU/L (ref 0–32)
AST SERPL-CCNC: 22 IU/L (ref 0–40)
BASOPHILS # BLD AUTO: 0 X10E3/UL (ref 0–0.2)
BASOPHILS NFR BLD AUTO: 1 %
BILIRUB SERPL-MCNC: <0.2 MG/DL (ref 0–1.2)
BUN SERPL-MCNC: 12 MG/DL (ref 8–27)
BUN/CREAT SERPL: 8 (ref 12–28)
CALCIUM SERPL-MCNC: 9.8 MG/DL (ref 8.7–10.3)
CHLORIDE SERPL-SCNC: 105 MMOL/L (ref 96–106)
CO2 SERPL-SCNC: 21 MMOL/L (ref 20–29)
CREAT SERPL-MCNC: 1.55 MG/DL (ref 0.57–1)
CRP SERPL-MCNC: 1 MG/L (ref 0–10)
EGFR: 34 ML/MIN/1.73
EOSINOPHIL # BLD AUTO: 0.1 X10E3/UL (ref 0–0.4)
EOSINOPHIL NFR BLD AUTO: 2 %
ERYTHROCYTE [DISTWIDTH] IN BLOOD BY AUTOMATED COUNT: 15.1 % (ref 11.7–15.4)
ERYTHROCYTE [SEDIMENTATION RATE] IN BLOOD BY WESTERGREN METHOD: 2 MM/HR (ref 0–40)
GLOBULIN SER CALC-MCNC: 2.4 G/DL (ref 1.5–4.5)
GLUCOSE SERPL-MCNC: 98 MG/DL (ref 65–99)
HCT VFR BLD AUTO: 32.8 % (ref 34–46.6)
HGB BLD-MCNC: 10 G/DL (ref 11.1–15.9)
IMM GRANULOCYTES # BLD AUTO: 0 X10E3/UL (ref 0–0.1)
IMM GRANULOCYTES NFR BLD AUTO: 0 %
LYMPHOCYTES # BLD AUTO: 1.4 X10E3/UL (ref 0.7–3.1)
LYMPHOCYTES NFR BLD AUTO: 23 %
MCH RBC QN AUTO: 28.1 PG (ref 26.6–33)
MCHC RBC AUTO-ENTMCNC: 30.5 G/DL (ref 31.5–35.7)
MCV RBC AUTO: 92 FL (ref 79–97)
MONOCYTES # BLD AUTO: 0.7 X10E3/UL (ref 0.1–0.9)
MONOCYTES NFR BLD AUTO: 11 %
NEUTROPHILS # BLD AUTO: 3.9 X10E3/UL (ref 1.4–7)
NEUTROPHILS NFR BLD AUTO: 63 %
PLATELET # BLD AUTO: 368 X10E3/UL (ref 150–450)
POTASSIUM SERPL-SCNC: 5.6 MMOL/L (ref 3.5–5.2)
PROT SERPL-MCNC: 6.7 G/DL (ref 6–8.5)
RBC # BLD AUTO: 3.56 X10E6/UL (ref 3.77–5.28)
SODIUM SERPL-SCNC: 142 MMOL/L (ref 134–144)
WBC # BLD AUTO: 6.1 X10E3/UL (ref 3.4–10.8)

## 2022-07-17 LAB — DRUGS UR: NORMAL

## 2022-08-12 NOTE — PROGRESS NOTES
REASON FOR VISIT:    is a 67 y.o. 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 HISTORY OF PRESENT ILLNESS    Pt returns for a follow up. She says she feels good today. Pt takes Khadar mawr. She says that she feels a lot better since she has been on this medication. She denies any infections or re-occurrence of Shingles since starting this medication. She is vaccinated against Shingles. Pt reports that her bilateral calves have been bothering her lately. She says that they hurt all the time, even when she is in bed. She says that she does not do a lot of walking and when she tries to walk her shins start to hurt even more. Last vitamin D was 45 on 7/11/22. She has taken simvastatin for years without recent changes, but denies more widespread myalgias. Pt takes 1 pill of Gabapentin every night. Pt does not take Tylenol or Ibuprofen. Pt denies issues with dry eye and dry mouth. Pt never smoked cigarettes.        REVIEW OF SYSTEMS  Negatives as follows:  Derinda Jose R:    Denies unexplained persistent fevers, weight change, chronic fatigue  HEAD/EYES:              Denies eye redness, blurry vision or sudden loss of vision, dry eyes, HA, temporal pain  ENT:                            Denies oral/nasal ulcers, recurrent sinus infections, dry mouth  RESPIRATORY:         No pleuritic pain, history of pleural effusions, hemoptysis, exertional dyspnea  CARDIOVASCULAR:             Denies chest pain, history of pericardial effusions  GASTRO:                    Denies heartburn, esophageal dysmotility, abdominal pain, nausea, vomiting, diarrhea, blood in the stool  HEMATOLOGIC:        No easy bruising, purpura, swollen lymph nodes  SKIN:                           Denies alopecia, ulcers, nodules, sun sensitivity, unexplained persistent rash   VASCULAR:                Denies edema, cyanosis, raynaud phenomenon  NEUROLOGIC:           +paresthesias in hands and feet Denies specific muscle weakness  PSYCHIATRIC:           No sleep disturbance / snoring, depression, anxiety  MSK:                           +extended stiffness, persistent joint pain      Review of systems is as above and is otherwise negative. ALLERGIES  Codeine and Sulfa (sulfonamide antibiotics)    MEDICATIONS  Current Outpatient Medications   Medication Sig    insulin detemir U-100 (Levemir U-100 Insulin) 100 unit/mL injection 25 Units by SubCUTAneous route two (2) times a day. amLODIPine (NORVASC) 5 mg tablet TAKE 1 TABLET DAILY    gabapentin (NEURONTIN) 300 mg capsule TAKE 2 CAPSULES NIGHTLY. MAXIMUM DAILY AMOUNT:      600MG. metFORMIN (GLUCOPHAGE) 1,000 mg tablet TAKE 1 TABLET TWICE A DAY    traMADoL (ULTRAM) 50 mg tablet Take 1 Tablet by mouth every eight (8) hours as needed for Pain for up to 90 days. Max Daily Amount: 150 mg.    simvastatin (ZOCOR) 20 mg tablet TAKE 1 TABLET NIGHTLY    pantoprazole (PROTONIX) 40 mg tablet TAKE 1 TABLET DAILY    meclizine (ANTIVERT) 12.5 mg tablet TAKE 1 TABLET 3 TIMES A DAYAS NEEDED FOR DIZZINESS    clopidogreL (PLAVIX) 75 mg tab TAKE 1 TABLET DAILY    ramipriL (ALTACE) 10 mg capsule TAKE 1 CAPSULE TWICE DAILY    Xeljanz XR 11 mg Tb24 Take 1 tablet by mouth daily as directed by physician    aspirin (Louann Chewable Aspirin) 81 mg chewable tablet Take 81 mg by mouth daily. diclofenac (VOLTAREN) 1 % gel Apply 1 g to affected area four (4) times daily. Apply to knee    OTHER Allergy plus patch from [facility]    aspirin/caffeine (BC ARTHRITIS PO) Take  by mouth two (2) times daily as needed. (Patient not taking: Reported on 2/24/2022)    Ferrous Fumarate 325 mg (106 mg iron) tab Take  by mouth. (Patient not taking: Reported on 2/24/2022)    cyanocobalamin (VITAMIN B12) 500 mcg tablet Take 1 Tab by mouth daily.     cholecalciferol (VITAMIN D3) (2,000 UNITS /50 MCG) cap capsule Take 2,000 Units by mouth daily. triamcinolone acetonide (KENALOG) 0.1 % ointment Apply  to affected area two (2) times a day. use thin layer on upper back (Patient taking differently: Apply  to affected area as needed. use thin layer on upper back)    famotidine (PEPCID) 40 mg tablet Take 40 mg by mouth daily. (Patient not taking: Reported on 2/24/2022)    diclofenac EC (VOLTAREN) 75 mg EC tablet Take 75 mg by mouth daily. insulin aspart U-100 (NovoLOG U-100 Insulin aspart) 100 unit/mL injection by SubCUTAneous route as needed. No current facility-administered medications for this visit. PAST MEDICAL HISTORY  Past Medical History:   Diagnosis Date    Chronic pain     Diabetes ([address]     Hypertension     Ovarian cyst     Removed Laproscopically       FAMILY HISTORY  Sister has rheumatoid arthritis, father had erosive osteoarthritis. SOCIAL HISTORY  She  reports that she has never smoked. She has never used smokeless tobacco. She reports that she does not drink alcohol and does not use drugs. Social History     Social History Narrative    [employer] school and course of Psychology   now cleans offices part-time, previously also CNA. DATA  Visit Vitals  BP (!) 163/61 (BP 1 Location: Left upper arm, BP Patient Position: Sitting) Comment: Dr. Rosa Leon notified of both BP readings   Pulse 75   Temp 97.9 °F (36.6 °C) (Oral)   Resp 18   Ht 5' 5\" (1.651 m)   Wt 152 lb (68.9 kg)   SpO2 97%   BMI 25.29 kg/m²      Body mass index is 25.29 kg/m². General:  The patient is well developed, well nourished, alert, and in no apparent distress. Eyes: Sclera are anicteric. No conjunctival injection. HEENT:  Oropharynx is clear. No oral ulcers. Adequate salivary pooling. No cervical or supraclavicular lymphadenopathy. Lungs:  Clear to auscultation bilaterally, without wheeze or stridor. Normal respiratory effort. Cor:  Regular rate and rhythm. No murmur rub or gallop.    Abdomen: Soft, non-tender, without hepatomegaly or masses. Extremities: No calf tenderness or edema. Warm and well perfused. Skin:  No significant abnormalities. No psoriaform lesions, no tophi. Neuro: +SLR bilaterally, normal gait. No LE muscle wasting, normal reflexes. Musculoskeletal:    A comprehensive musculoskeletal exam was performed for all joints of each upper and lower extremity and assessed for swelling, tenderness and range of motion. Results are documented as below:  Marked persistent Cleo nodes globally right hand without tenderness, interval improved flexion. Still no synovitis. Index PIP fused, improved ~5deg flexion deformities in other PIPs. Bilateral knee crepitus with small Baker cysts. Bilateral calf tenderness without swelling. No evidence of synovitis in the small joints of the hands, wrists, shoulders, elbows, hips, knees or ankles. Labs:  22: WBC 6.1,  HGB 10.1, Plt 386, Cr 1.65, LFT WNL, Triglyceride 219, HGB A1c 7.3, Vit B12 724, Folate 5.5, Vit D 45.4, ESR 2, Cr 1.55, LFT WNL, WBC 6.1, HGB 10, Plt 368, CRP 1 mg/L  10/13/21: WBC 8.1, Hgb 10.1, Plt 313; Cr 1.47, Ca 10.4, LFTs WNL; , HDL 46, ; A1c 8.2  21: CBC WNL; ESR 21; Cr 1.2, LFTs WNL; TSH WNL; iPTH WNL; CRP 16mg/L  21: Cr 1.38, CMP WNL, CBC WNL (Hgb 10.5)  20: CBC WNL, Cr 1.1, Hep B cAb neg, sAb neg, sAg neg; Hep C Ab neg; QFN gold neg  20:     Imagin21 DXA:  This patient is osteoporotic using the World Health Organization criteria  10 year probability of major osteoporotic fracture: 22.3%  10 year probability of hip fracture: 8.7%    21 XR R hand: FINDINGS: Three views of the right hand demonstrate interphalangeal joints  demonstrate joint space narrowing. DIP joints degenerative joint space  narrowing. Carpal metacarpal joint space narrowing. Radiocarpal joint space  narrowing. . There are small erosions present. There is ulnar deviation at the  DIP joints.   IMPRESSION  There is a combination of osteoarthritis and erosive arthritis. XR L hand: Moderate to severe osteoarthritis. .    6/8/17 XR Lspine (report only): Impression: Plain radiographs demonstrate 100% loss of disc height at L3-4, L4-5, and L5-S1 with resultant foraminal stenosis. ASSESSMENT AND PLAN  Ms. Alon Benavides is a 67 y.o. 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 Inflammatory arthritis is improving with [place] Query with no actively inflamed joints currently, though etiology of bilateral calf tenderness less clear. Low suspicion for DVT with bilateral disease and no associated swelling. Will start with trial hold of simvastatin. Continuing Xeljanz monotherapy for seronegative rheumatoid arthritis. 1. Seronegative rheumatoid arthritis ([facility]  - Cont Xeljanz XR 11mg daily  - C REACTIVE PROTEIN, QT; Future  - CBC WITH AUTOMATED DIFF; Future  - METABOLIC PANEL, COMPREHENSIVE; Future  - SED RATE (ESR); Future    2. Osteoporosis, unspecified osteoporosis type, unspecified pathological fracture presence  - Cont Prolia q6mo, overdue. . pt agrees to schedule now  - CBC WITH AUTOMATED DIFF; Future  - METABOLIC PANEL, COMPREHENSIVE; Future    Patient Instructions   1) Continue to take one pill of Xeljanz daily. 2) Schedule your Prolia shot ASAP. 3) Hold the Simvastatin for 2 weeks and see if this helps your calf pain. 4) Do your best to stay active. Walking is a good option because it will also help bone density. If walking is too painful you can try swimming or cycling. 5) You can take 650mg of Tylenol up to 3 times a day for pain. 6) Try to increase Gabapentin to 2 pills before bed if you are having trouble sleeping. 7) Check labs in 3 months. 8) Follow up in 3-4 months. Let me know if you have any questions or concerns in the meantime.    Orders Placed This Encounter    C REACTIVE PROTEIN, QT    CBC WITH AUTOMATED DIFF    METABOLIC PANEL, COMPREHENSIVE SED RATE (ESR)         Medications: I am having Winter Perez maintain her famotidine, diclofenac EC, insulin aspart U-100, triamcinolone acetonide, cyanocobalamin, cholecalciferol, Ferrous Fumarate, OTHER, aspirin/caffeine (BC ARTHRITIS PO), aspirin, diclofenac, Xeljanz XR, amLODIPine, gabapentin, metFORMIN, traMADoL, simvastatin, pantoprazole, meclizine, clopidogreL, ramipriL, and insulin detemir U-100.     Follow up: 3 months    Face to face time: 20 minutes  Note preparation and records review day of service: 24 minutes  Total provider time day of service: 44 minutes      Wisam Lockett MD    Adult Rheumatology   [address], [address] [facility], [address] [place] [address]   Phone [phone]  Fax [phone]

## 2022-08-15 ENCOUNTER — OFFICE VISIT (OUTPATIENT)
Dept: RHEUMATOLOGY | Age: 77
End: 2022-08-15
Payer: MEDICARE

## 2022-08-15 ENCOUNTER — TELEPHONE (OUTPATIENT)
Dept: FAMILY MEDICINE CLINIC | Age: 77
End: 2022-08-15

## 2022-08-15 VITALS
SYSTOLIC BLOOD PRESSURE: 163 MMHG | BODY MASS INDEX: 25.33 KG/M2 | HEART RATE: 75 BPM | DIASTOLIC BLOOD PRESSURE: 61 MMHG | WEIGHT: 152 LBS | RESPIRATION RATE: 18 BRPM | OXYGEN SATURATION: 97 % | HEIGHT: 65 IN | TEMPERATURE: 97.9 F

## 2022-08-15 DIAGNOSIS — M81.0 OSTEOPOROSIS, UNSPECIFIED OSTEOPOROSIS TYPE, UNSPECIFIED PATHOLOGICAL FRACTURE PRESENCE: ICD-10-CM

## 2022-08-15 DIAGNOSIS — M06.00 SERONEGATIVE RHEUMATOID ARTHRITIS (HCC): Primary | ICD-10-CM

## 2022-08-15 PROCEDURE — G8754 DIAS BP LESS 90: HCPCS | Performed by: INTERNAL MEDICINE

## 2022-08-15 PROCEDURE — 1123F ACP DISCUSS/DSCN MKR DOCD: CPT | Performed by: INTERNAL MEDICINE

## 2022-08-15 PROCEDURE — 1090F PRES/ABSN URINE INCON ASSESS: CPT | Performed by: INTERNAL MEDICINE

## 2022-08-15 PROCEDURE — G8427 DOCREV CUR MEDS BY ELIG CLIN: HCPCS | Performed by: INTERNAL MEDICINE

## 2022-08-15 PROCEDURE — 1101F PT FALLS ASSESS-DOCD LE1/YR: CPT | Performed by: INTERNAL MEDICINE

## 2022-08-15 PROCEDURE — G8536 NO DOC ELDER MAL SCRN: HCPCS | Performed by: INTERNAL MEDICINE

## 2022-08-15 PROCEDURE — 99215 OFFICE O/P EST HI 40 MIN: CPT | Performed by: INTERNAL MEDICINE

## 2022-08-15 PROCEDURE — G8510 SCR DEP NEG, NO PLAN REQD: HCPCS | Performed by: INTERNAL MEDICINE

## 2022-08-15 PROCEDURE — G8417 CALC BMI ABV UP PARAM F/U: HCPCS | Performed by: INTERNAL MEDICINE

## 2022-08-15 PROCEDURE — G8753 SYS BP > OR = 140: HCPCS | Performed by: INTERNAL MEDICINE

## 2022-08-15 RX ORDER — EPINEPHRINE 1 MG/ML
0.3 INJECTION, SOLUTION, CONCENTRATE INTRAVENOUS AS NEEDED
OUTPATIENT
Start: 2022-08-15

## 2022-08-15 RX ORDER — HYDROCORTISONE SODIUM SUCCINATE 100 MG/2ML
100 INJECTION, POWDER, FOR SOLUTION INTRAMUSCULAR; INTRAVENOUS AS NEEDED
OUTPATIENT
Start: 2022-08-15

## 2022-08-15 RX ORDER — DIPHENHYDRAMINE HYDROCHLORIDE 50 MG/ML
50 INJECTION, SOLUTION INTRAMUSCULAR; INTRAVENOUS AS NEEDED
Start: 2022-08-15

## 2022-08-15 RX ORDER — ONDANSETRON 2 MG/ML
8 INJECTION INTRAMUSCULAR; INTRAVENOUS AS NEEDED
OUTPATIENT
Start: 2022-08-15

## 2022-08-15 RX ORDER — ALBUTEROL SULFATE 0.83 MG/ML
2.5 SOLUTION RESPIRATORY (INHALATION) AS NEEDED
Start: 2022-08-15

## 2022-08-15 RX ORDER — ACETAMINOPHEN 325 MG/1
650 TABLET ORAL AS NEEDED
Start: 2022-08-15

## 2022-08-15 RX ORDER — DIPHENHYDRAMINE HYDROCHLORIDE 50 MG/ML
25 INJECTION, SOLUTION INTRAMUSCULAR; INTRAVENOUS AS NEEDED
Start: 2022-08-15

## 2022-08-15 NOTE — TELEPHONE ENCOUNTER
[facility] would like to know if you can do the PROLIA injection? If not where is the closest place she can go to get this done?

## 2022-08-15 NOTE — PROGRESS NOTES
Chief Complaint   Patient presents with    Arthritis     . 1. Have you been to the ER, urgent care clinic since your last visit? Hospitalized since your last visit? No    2. Have you seen or consulted any other health care providers outside of the [address] [place] [address] since your last visit? Include any pap smears or colon screening.  Yes Where: PCP

## 2022-08-15 NOTE — PATIENT INSTRUCTIONS
1) Continue to take one pill of Xeljanz daily. 2) Schedule your Prolia shot ASAP. 3) Hold the Simvastatin for 2 weeks and see if this helps your calf pain. 4) Do your best to stay active. Walking is a good option because it will also help bone density. If walking is too painful you can try swimming or cycling. 5) You can take 650mg of Tylenol up to 3 times a day for pain. 6) Try to increase Gabapentin to 2 pills before bed if you are having trouble sleeping. 7) Check labs in 3 months. 8) Follow up in 3-4 months. Let me know if you have any questions or concerns in the meantime.

## 2022-11-08 DIAGNOSIS — M06.00 SERONEGATIVE RHEUMATOID ARTHRITIS (HCC): ICD-10-CM

## 2022-11-09 RX ORDER — TOFACITINIB 11 MG/1
TABLET, FILM COATED, EXTENDED RELEASE ORAL
Qty: 90 TABLET | Refills: 0 | Status: SHIPPED | OUTPATIENT
Start: 2022-11-09

## 2022-11-16 ENCOUNTER — OFFICE VISIT (OUTPATIENT)
Dept: RHEUMATOLOGY | Age: 77
End: 2022-11-16
Payer: MEDICARE

## 2022-11-16 VITALS
OXYGEN SATURATION: 95 % | RESPIRATION RATE: 16 BRPM | SYSTOLIC BLOOD PRESSURE: 176 MMHG | HEART RATE: 78 BPM | DIASTOLIC BLOOD PRESSURE: 47 MMHG | TEMPERATURE: 98 F | WEIGHT: 152 LBS | BODY MASS INDEX: 25.29 KG/M2

## 2022-11-16 DIAGNOSIS — M81.0 OSTEOPOROSIS, UNSPECIFIED OSTEOPOROSIS TYPE, UNSPECIFIED PATHOLOGICAL FRACTURE PRESENCE: ICD-10-CM

## 2022-11-16 DIAGNOSIS — N18.32 STAGE 3B CHRONIC KIDNEY DISEASE (HCC): ICD-10-CM

## 2022-11-16 DIAGNOSIS — M15.4 EROSIVE OSTEOARTHRITIS OF BOTH HANDS: ICD-10-CM

## 2022-11-16 DIAGNOSIS — M06.00 SERONEGATIVE RHEUMATOID ARTHRITIS (HCC): Primary | ICD-10-CM

## 2022-11-16 PROBLEM — N18.30 CHRONIC RENAL DISEASE, STAGE III (HCC): Status: ACTIVE | Noted: 2022-11-16

## 2022-11-16 PROCEDURE — 1101F PT FALLS ASSESS-DOCD LE1/YR: CPT | Performed by: INTERNAL MEDICINE

## 2022-11-16 PROCEDURE — 1090F PRES/ABSN URINE INCON ASSESS: CPT | Performed by: INTERNAL MEDICINE

## 2022-11-16 PROCEDURE — G8753 SYS BP > OR = 140: HCPCS | Performed by: INTERNAL MEDICINE

## 2022-11-16 PROCEDURE — 3078F DIAST BP <80 MM HG: CPT | Performed by: INTERNAL MEDICINE

## 2022-11-16 PROCEDURE — G8427 DOCREV CUR MEDS BY ELIG CLIN: HCPCS | Performed by: INTERNAL MEDICINE

## 2022-11-16 PROCEDURE — G8510 SCR DEP NEG, NO PLAN REQD: HCPCS | Performed by: INTERNAL MEDICINE

## 2022-11-16 PROCEDURE — G8754 DIAS BP LESS 90: HCPCS | Performed by: INTERNAL MEDICINE

## 2022-11-16 PROCEDURE — 99214 OFFICE O/P EST MOD 30 MIN: CPT | Performed by: INTERNAL MEDICINE

## 2022-11-16 PROCEDURE — 1123F ACP DISCUSS/DSCN MKR DOCD: CPT | Performed by: INTERNAL MEDICINE

## 2022-11-16 PROCEDURE — G8536 NO DOC ELDER MAL SCRN: HCPCS | Performed by: INTERNAL MEDICINE

## 2022-11-16 PROCEDURE — 3075F SYST BP GE 130 - 139MM HG: CPT | Performed by: INTERNAL MEDICINE

## 2022-11-16 PROCEDURE — G8417 CALC BMI ABV UP PARAM F/U: HCPCS | Performed by: INTERNAL MEDICINE

## 2022-11-16 RX ORDER — PREDNISONE 20 MG/1
TABLET ORAL
COMMUNITY
Start: 2022-10-22

## 2022-11-16 NOTE — PROGRESS NOTES
Chief Complaint   Patient presents with    Joint Pain     1. Have you been to the ER, urgent care clinic since your last visit? Hospitalized since your last visit? [facility] around October for Left Hip pain, went to [facility] for cortisone shot. 2. Have you seen or consulted any other health care providers outside of the [address] [place] [address] since your last visit? Include any pap smears or colon screening.  No

## 2022-11-16 NOTE — PROGRESS NOTES
REASON FOR VISIT:    is a 65 y.o. 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 HISTORY OF PRESENT ILLNESS    Pt returns for a follow up. Pt still takes one pill of Xeljanz daily. She says that she can use her hands for abut everything and she has no morning stiffness. She denies problems with infections. Pt notes that she forgot to get her last Prolia shot after having to travel to her brother's  in the [facility] when she was due last. Her last shot was on  of this year. Her calcium last week was 10.0, with stably elevated Cr of 1.6. Pt has back pain, but she says it is not bad enough to where she wants to get surgery. Pt just received a trochanteric injection, which she says helps. Before this she had a lot of pain in her hip, and she could barely work. Pt says that she has some neck pain, but it is not a big deal.     Pt had a fall when she was in [place]. She was sitting in a chair close to the floor bending over to throw up and she fell forward. She went to the ER and stayed overnight, with a week or two of increased neck pain after. Pt says that her blood pressure is usually good (from 055Q-912K systolic) when she is at home. She attributes her higher blood pressure today to \"white coat syndrome. \"    No interval infections.     REVIEW OF SYSTEMS  Negatives as follows:  Denise Avers:    Denies unexplained persistent fevers, weight change, chronic fatigue  HEAD/EYES:              Denies eye redness, blurry vision or sudden loss of vision, dry eyes, HA, temporal pain  ENT:                            Denies oral/nasal ulcers, recurrent sinus infections, dry mouth  RESPIRATORY:         No pleuritic pain, history of pleural effusions, hemoptysis, exertional dyspnea  CARDIOVASCULAR:             Denies chest pain, history of pericardial effusions  GASTRO:                    Denies heartburn, esophageal dysmotility, abdominal pain, nausea, vomiting, diarrhea, blood in the stool  HEMATOLOGIC:        No easy bruising, purpura, swollen lymph nodes  SKIN:                           Denies alopecia, ulcers, nodules, sun sensitivity, unexplained persistent rash   VASCULAR:                Denies edema, cyanosis, raynaud phenomenon  NEUROLOGIC:           +paresthesias in hands and feet Denies specific muscle weakness  PSYCHIATRIC:           No sleep disturbance / snoring, depression, anxiety  MSK:                           +extended stiffness, persistent joint pain      Review of systems is as above and is otherwise negative. ALLERGIES  Codeine and Sulfa (sulfonamide antibiotics)    MEDICATIONS  Current Outpatient Medications   Medication Sig    predniSONE (DELTASONE) 20 mg tablet     Xeljanz XR 11 mg Tb24 Take 1 tablet by mouth daily as directed by physician    gabapentin (NEURONTIN) 300 mg capsule TAKE 2 CAPSULES NIGHTLY. MAXIMUM DAILY AMOUNT:      600MG. meclizine (ANTIVERT) 12.5 mg tablet TAKE 1 TABLET 3 TIMES A DAYAS NEEDED FOR DIZZINESS    insulin detemir U-100 (Levemir U-100 Insulin) 100 unit/mL injection 25 Units by SubCUTAneous route two (2) times a day. amLODIPine (NORVASC) 5 mg tablet TAKE 1 TABLET DAILY    metFORMIN (GLUCOPHAGE) 1,000 mg tablet TAKE 1 TABLET TWICE A DAY    simvastatin (ZOCOR) 20 mg tablet TAKE 1 TABLET NIGHTLY    pantoprazole (PROTONIX) 40 mg tablet TAKE 1 TABLET DAILY    clopidogreL (PLAVIX) 75 mg tab TAKE 1 TABLET DAILY    ramipriL (ALTACE) 10 mg capsule TAKE 1 CAPSULE TWICE DAILY    aspirin 81 mg chewable tablet Take 81 mg by mouth daily. (Patient not taking: Reported on 8/15/2022)    diclofenac (VOLTAREN) 1 % gel Apply 1 g to affected area four (4) times daily. Apply to knee    OTHER Allergy plus patch from [facility]    aspirin/caffeine (BC ARTHRITIS PO) Take  by mouth two (2) times daily as needed.  (Patient not taking: No sig reported)    Ferrous Fumarate 325 mg (106 mg iron) tab Take  by mouth.    cyanocobalamin (VITAMIN B12) 500 mcg tablet Take 1 Tab by mouth daily. cholecalciferol (VITAMIN D3) (2,000 UNITS /50 MCG) cap capsule Take 2,000 Units by mouth daily. triamcinolone acetonide (KENALOG) 0.1 % ointment Apply  to affected area two (2) times a day. use thin layer on upper back (Patient not taking: Reported on 8/15/2022)    famotidine (PEPCID) 40 mg tablet Take 40 mg by mouth daily. (Patient not taking: No sig reported)    diclofenac EC (VOLTAREN) 75 mg EC tablet Take 75 mg by mouth daily. insulin aspart U-100 (NOVOLOG) 100 unit/mL injection by SubCUTAneous route as needed. No current facility-administered medications for this visit. PAST MEDICAL HISTORY  Past Medical History:   Diagnosis Date    Chronic pain     Diabetes ([address]     Hypertension     Ovarian cyst     Removed Laproscopically       FAMILY HISTORY  Sister has rheumatoid arthritis, father had erosive osteoarthritis. SOCIAL HISTORY  She  reports that she has never smoked. She has never used smokeless tobacco. She reports that she does not drink alcohol and does not use drugs. Social History     Social History Narrative    Cosmetology school and course of Psychology   now cleans offices part-time, previously also CNA. DATA  Visit Vitals  BP (!) 176/47 (BP 1 Location: Right upper arm, BP Patient Position: Sitting, BP Cuff Size: Adult)   Pulse 78   Temp 98 °F (36.7 °C) (Oral)   Resp 16   Wt 152 lb (68.9 kg)   SpO2 95%   BMI 25.29 kg/m²       General:  The patient is well developed, well nourished, alert, and in no apparent distress. Eyes: Sclera are anicteric. No conjunctival injection. HEENT:  Oropharynx is clear. No oral ulcers. Adequate salivary pooling. No cervical or supraclavicular lymphadenopathy. Lungs:  Clear to auscultation bilaterally, without wheeze or stridor. Normal respiratory effort.    Cor:  Regular rate and rhythm. No murmur rub or gallop. Abdomen: Soft, non-tender, without hepatomegaly or masses. Extremities: No calf tenderness or edema. Warm and well perfused. Skin:  No significant abnormalities. No psoriaform lesions, no tophi. Neuro: +SLR bilaterally, normal gait. No LE muscle wasting, normal reflexes. Musculoskeletal:    A comprehensive musculoskeletal exam was performed for all joints of each upper and lower extremity and assessed for swelling, tenderness and range of motion. Results are documented as below:  No basilar neck pain currently, +cervical crepitus with rotation. Marked unchanged Cleo nodes globally right hand without tenderness, stably improved but still decreased flexion. Still no synovitis. Index PIP fused, improved ~5deg flexion deformities in other PIPs. No trochanteric tenderness today  Bilateral knee crepitus with small Baker cysts. Bilateral calf tenderness without swelling. No evidence of synovitis in the small joints of the hands, wrists, shoulders, elbows, hips, knees or ankles. Labs:  22: ESR 2, Cr 1.64, LFT WNL, WBC 7.1, HGB 9.7, Plt 319, CRP 1 mg/L  22: Lactic acid 1, WBC 10.1, HGB 9.2, Plt 334, Cr 1.89, LFT WNL, Mg 1.2, Phosphorus 4.1  22: WBC 6.1,  HGB 10.1, Plt 386, Cr 1.65, LFT WNL, Triglyceride 219, HGB A1c 7.3, Vit B12 724, Folate 5.5, Vit D 45.4, ESR 2, Cr 1.55, LFT WNL, WBC 6.1, HGB 10, Plt 368, CRP 1 mg/L  10/13/21: WBC 8.1, Hgb 10.1, Plt 313; Cr 1.47, Ca 10.4, LFTs WNL; , HDL 46, ; A1c 8.2  21: CBC WNL; ESR 21; Cr 1.2, LFTs WNL; TSH WNL; iPTH WNL; CRP 16mg/L  21: Cr 1.38, CMP WNL, CBC WNL (Hgb 10.5)  20: CBC WNL, Cr 1.1, Hep B cAb neg, sAb neg, sAg neg;  Hep C Ab neg; QFN gold neg  20:     Imagin21 DXA:  This patient is osteoporotic using the World Health Organization criteria  10 year probability of major osteoporotic fracture: 22.3%  10 year probability of hip fracture: 8.7%    21 XR R hand: FINDINGS: Three views of the right hand demonstrate interphalangeal joints  demonstrate joint space narrowing. DIP joints degenerative joint space  narrowing. Carpal metacarpal joint space narrowing. Radiocarpal joint space  narrowing. . There are small erosions present. There is ulnar deviation at the  DIP joints. IMPRESSION  There is a combination of osteoarthritis and erosive arthritis. XR L hand: Moderate to severe osteoarthritis. .    6/8/17 XR Lspine (report only): Impression: Plain radiographs demonstrate 100% loss of disc height at L3-4, L4-5, and L5-S1 with resultant foraminal stenosis. ASSESSMENT AND PLAN  Ms. Alba is a 65 y.o. 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 Inflammatory arthritis is improving with Creed Dat with no actively inflamed joints currently. Continuing Xeljanz monotherapy, reviewed boxed warnings re: cardiovascular disease but she prefers to continue this given her marked functional improvement on it. Renal insufficiency is borderline for Prolia, but with high-normal calcium the benefit outweighs the risk. With any further worsening in eGFR will wean with a brief course of alendronate to mitigate rebound bone loss. 1. Seronegative rheumatoid arthritis ([address]  - Cont Xeljanz daily  - C REACTIVE PROTEIN, QT; Future  - CBC WITH AUTOMATED DIFF; Future  - METABOLIC PANEL, COMPREHENSIVE; Future  - SED RATE (ESR); Future    2. Osteoporosis, unspecified osteoporosis type, unspecified pathological fracture presence  - METABOLIC PANEL, COMPREHENSIVE; Future    3. Erosive osteoarthritis of both hands  - C REACTIVE PROTEIN, QT; Future  - CBC WITH AUTOMATED DIFF; Future  - METABOLIC PANEL, COMPREHENSIVE; Future  - SED RATE (ESR); Future    4.  Stage 3b chronic kidney disease ([address]  -Continue Prolia for now, low threshold to wean off with alendronate      Patient Instructions   1) Continue to take one pill of Abiodun Vaz daily. Hold this anytime you are on antibiotics. 2) Schedule your next Prolia injection ASAP. Call [phone] to schedule. 3) You can take 650mg of Tylenol up to 3 times a day for joint pain. 4) Check labs in 3 months. 5) Follow up in 5 months. Let me know if you have any questions or concerns in the meantime. Orders Placed This Encounter    C REACTIVE PROTEIN, QT    CBC WITH AUTOMATED DIFF    METABOLIC PANEL, COMPREHENSIVE    SED RATE (ESR)    predniSONE (DELTASONE) 20 mg tablet     Medications: I am having Timoteo Peabody maintain her famotidine, diclofenac EC, insulin aspart U-100, triamcinolone acetonide, cyanocobalamin, cholecalciferol, Ferrous Fumarate, OTHER, aspirin/caffeine (BC ARTHRITIS PO), aspirin, diclofenac, amLODIPine, metFORMIN, simvastatin, pantoprazole, clopidogreL, ramipriL, insulin detemir U-100, gabapentin, meclizine, Xeljanz XR, and predniSONE.     Face to face time: 15 minutes  Note preparation and records review day of service: 20 minutes  Total provider time day of service: 35 minutes

## 2022-11-16 NOTE — PATIENT INSTRUCTIONS
1) Continue to take one pill of Xeljanz daily. Hold this anytime you are on antibiotics. 2) Schedule your next Prolia injection ASAP. Call [phone] to schedule. 3) You can take 650mg of Tylenol up to 3 times a day for joint pain. 4) Check labs in 3 months. 5) Follow up in 5 months. Let me know if you have any questions or concerns in the meantime.

## 2022-11-23 ENCOUNTER — HOSPITAL ENCOUNTER (OUTPATIENT)
Dept: INFUSION THERAPY | Age: 77
Discharge: HOME | End: 2022-11-23
Payer: MEDICARE

## 2022-11-23 VITALS
RESPIRATION RATE: 16 BRPM | TEMPERATURE: 98 F | BODY MASS INDEX: 25.69 KG/M2 | SYSTOLIC BLOOD PRESSURE: 173 MMHG | HEART RATE: 73 BPM | OXYGEN SATURATION: 100 % | DIASTOLIC BLOOD PRESSURE: 69 MMHG | WEIGHT: 154.4 LBS

## 2022-11-23 DIAGNOSIS — M81.0 OSTEOPOROSIS, UNSPECIFIED OSTEOPOROSIS TYPE, UNSPECIFIED PATHOLOGICAL FRACTURE PRESENCE: Primary | ICD-10-CM

## 2022-11-23 LAB
ANION GAP BLD CALC-SCNC: 7 MMOL/L (ref 10–20)
CA-I BLD-MCNC: 1.22 MMOL/L (ref 1.12–1.32)
CHLORIDE BLD-SCNC: 106 MMOL/L (ref 98–107)
CO2 BLD-SCNC: 27.6 MMOL/L (ref 21–32)
CREAT BLD-MCNC: 1.35 MG/DL (ref 0.6–1.3)
GLUCOSE BLD-MCNC: 74 MG/DL (ref 65–100)
MAGNESIUM SERPL-MCNC: 1.4 MG/DL (ref 1.6–2.4)
PHOSPHATE SERPL-MCNC: 3.7 MG/DL (ref 2.6–4.7)
POTASSIUM BLD-SCNC: 5 MMOL/L (ref 3.5–5.1)
SERVICE CMNT-IMP: ABNORMAL
SODIUM BLD-SCNC: 140 MMOL/L (ref 136–145)

## 2022-11-23 PROCEDURE — 96372 THER/PROPH/DIAG INJ SC/IM: CPT

## 2022-11-23 PROCEDURE — 83735 ASSAY OF MAGNESIUM: CPT

## 2022-11-23 PROCEDURE — 84100 ASSAY OF PHOSPHORUS: CPT

## 2022-11-23 PROCEDURE — 80047 BASIC METABLC PNL IONIZED CA: CPT

## 2022-11-23 PROCEDURE — 74011250636 HC RX REV CODE- 250/636: Performed by: INTERNAL MEDICINE

## 2022-11-23 PROCEDURE — 36415 COLL VENOUS BLD VENIPUNCTURE: CPT

## 2022-11-23 RX ORDER — DIPHENHYDRAMINE HYDROCHLORIDE 50 MG/ML
50 INJECTION, SOLUTION INTRAMUSCULAR; INTRAVENOUS AS NEEDED
Start: 2023-01-25

## 2022-11-23 RX ORDER — EPINEPHRINE 1 MG/ML
0.3 INJECTION, SOLUTION, CONCENTRATE INTRAVENOUS AS NEEDED
OUTPATIENT
Start: 2023-01-25

## 2022-11-23 RX ORDER — ONDANSETRON 2 MG/ML
8 INJECTION INTRAMUSCULAR; INTRAVENOUS AS NEEDED
OUTPATIENT
Start: 2023-01-25

## 2022-11-23 RX ORDER — DIPHENHYDRAMINE HYDROCHLORIDE 50 MG/ML
25 INJECTION, SOLUTION INTRAMUSCULAR; INTRAVENOUS AS NEEDED
Start: 2023-01-25

## 2022-11-23 RX ORDER — ACETAMINOPHEN 325 MG/1
650 TABLET ORAL AS NEEDED
Start: 2023-01-25

## 2022-11-23 RX ORDER — ALBUTEROL SULFATE 0.83 MG/ML
2.5 SOLUTION RESPIRATORY (INHALATION) AS NEEDED
Start: 2023-01-25

## 2022-11-23 RX ORDER — HYDROCORTISONE SODIUM SUCCINATE 100 MG/2ML
100 INJECTION, POWDER, FOR SOLUTION INTRAMUSCULAR; INTRAVENOUS AS NEEDED
OUTPATIENT
Start: 2023-01-25

## 2022-11-23 RX ADMIN — DENOSUMAB 60 MG: 60 INJECTION SUBCUTANEOUS at 11:42

## 2022-11-23 NOTE — PROGRESS NOTES
Outpatient Infusion Center Progress Note    0532 Pt admit to [facility] for Prolia ambulatory in stable condition. Assessment completed. No new concerns voiced. Labs drawn peripherally from left [facility] and sent for processing. E poc performed, results are within parameters to treat. Patient declined any recent invasive dental procedures. Visit Vitals  BP (!) 173/69 (BP 1 Location: Left arm, BP Patient Position: Sitting)   Pulse 73   Temp 98 °F (36.7 °C)   Resp 16   Wt 70 kg (154 lb 6.4 oz)   SpO2 100%   Breastfeeding No   BMI 25.69 kg/m²     Recent Results (from the past 12 hour(s))   POC CHEM8    Collection Time: 11/23/22 11:21 AM   Result Value Ref Range    Calcium, ionized (POC) 1.22 1.12 - 1.32 mmol/L    Sodium (POC) 140 136 - 145 mmol/L    Potassium (POC) 5.0 3.5 - 5.1 mmol/L    Chloride (POC) 106 98 - 107 mmol/L    CO2 (POC) 27.6 21 - 32 mmol/L    Anion gap (POC) 7 (L) 10 - 20 mmol/L    Glucose (POC) 74 65 - 100 mg/dL    Creatinine (POC) 1.35 (H) 0.6 - 1.3 mg/dL    eGFR (POC) 40 (L) >60 ml/min/1.73m2    Comment Comment Not Indicated. Medications:  Medications Administered       denosumab (PROLIA) injection 60 mg       Admin Date  11/23/2022 Action  Given Dose  60 mg Route  SubCUTAneous Administered By  Jyoti Watson, RN                     Given in right upper arm. 1145 Pt tolerated treatment well. D/c home ambulatory in no distress.  Pt to schedule more appointments with PSR

## 2022-12-25 ENCOUNTER — HOSPITAL ENCOUNTER (INPATIENT)
Age: 77
LOS: 1 days | Discharge: HOME | DRG: 392 | End: 2022-12-26
Attending: STUDENT IN AN ORGANIZED HEALTH CARE EDUCATION/TRAINING PROGRAM | Admitting: INTERNAL MEDICINE
Payer: MEDICARE

## 2022-12-25 ENCOUNTER — APPOINTMENT (OUTPATIENT)
Dept: GENERAL RADIOLOGY | Age: 77
DRG: 392 | End: 2022-12-25
Attending: STUDENT IN AN ORGANIZED HEALTH CARE EDUCATION/TRAINING PROGRAM
Payer: MEDICARE

## 2022-12-25 DIAGNOSIS — K29.90 GASTRITIS AND DUODENITIS: Primary | ICD-10-CM

## 2022-12-25 DIAGNOSIS — E86.0 DEHYDRATION: ICD-10-CM

## 2022-12-25 PROBLEM — A08.4 VIRAL GASTROENTERITIS: Status: ACTIVE | Noted: 2022-12-25

## 2022-12-25 LAB
ALBUMIN SERPL-MCNC: 2.8 G/DL (ref 3.5–5)
ALBUMIN/GLOB SERPL: 0.8 (ref 1.1–2.2)
ALP SERPL-CCNC: 71 U/L (ref 45–117)
ALT SERPL-CCNC: 20 U/L (ref 12–78)
ANION GAP SERPL CALC-SCNC: 5 MMOL/L (ref 5–15)
APPEARANCE UR: CLEAR
AST SERPL W P-5'-P-CCNC: 19 U/L (ref 15–37)
BACTERIA URNS QL MICRO: NEGATIVE /HPF
BASOPHILS # BLD: 0 K/UL (ref 0–0.1)
BASOPHILS NFR BLD: 0 % (ref 0–1)
BILIRUB SERPL-MCNC: 0.2 MG/DL (ref 0.2–1)
BILIRUB UR QL: ABNORMAL
BUN SERPL-MCNC: 21 MG/DL (ref 6–20)
BUN/CREAT SERPL: 9 (ref 12–20)
CA-I BLD-MCNC: 9.3 MG/DL (ref 8.5–10.1)
CHLORIDE SERPL-SCNC: 109 MMOL/L (ref 97–108)
CO2 SERPL-SCNC: 27 MMOL/L (ref 21–32)
COLOR UR: ABNORMAL
COVID-19 RAPID TEST, COVR: NOT DETECTED
CREAT SERPL-MCNC: 2.23 MG/DL (ref 0.55–1.02)
DIFFERENTIAL METHOD BLD: ABNORMAL
EOSINOPHIL # BLD: 0.1 K/UL (ref 0–0.4)
EOSINOPHIL NFR BLD: 0 % (ref 0–7)
ERYTHROCYTE [DISTWIDTH] IN BLOOD BY AUTOMATED COUNT: 16.7 % (ref 11.5–14.5)
FLUAV AG NPH QL IA: NEGATIVE
FLUBV AG NOSE QL IA: NEGATIVE
GLOBULIN SER CALC-MCNC: 3.4 G/DL (ref 2–4)
GLUCOSE BLD STRIP.AUTO-MCNC: 137 MG/DL (ref 65–100)
GLUCOSE SERPL-MCNC: 139 MG/DL (ref 65–100)
GLUCOSE UR STRIP.AUTO-MCNC: NEGATIVE MG/DL
HCT VFR BLD AUTO: 33.2 % (ref 35–47)
HGB BLD-MCNC: 10.6 G/DL (ref 11.5–16)
HGB UR QL STRIP: NEGATIVE
IMM GRANULOCYTES # BLD AUTO: 0.1 K/UL (ref 0–0.04)
IMM GRANULOCYTES NFR BLD AUTO: 1 % (ref 0–0.5)
KETONES UR QL STRIP.AUTO: NEGATIVE MG/DL
LEUKOCYTE ESTERASE UR QL STRIP.AUTO: NEGATIVE
LYMPHOCYTES # BLD: 1.3 K/UL (ref 0.8–3.5)
LYMPHOCYTES NFR BLD: 9 % (ref 12–49)
MAGNESIUM SERPL-MCNC: 1.4 MG/DL (ref 1.6–2.4)
MCH RBC QN AUTO: 28.2 PG (ref 26–34)
MCHC RBC AUTO-ENTMCNC: 31.9 G/DL (ref 30–36.5)
MCV RBC AUTO: 88.3 FL (ref 80–99)
MONOCYTES # BLD: 1.1 K/UL (ref 0–1)
MONOCYTES NFR BLD: 8 % (ref 5–13)
MUCOUS THREADS URNS QL MICRO: ABNORMAL /LPF
NEUTS SEG # BLD: 12 K/UL (ref 1.8–8)
NEUTS SEG NFR BLD: 82 % (ref 32–75)
NITRITE UR QL STRIP.AUTO: NEGATIVE
NRBC # BLD: 0 K/UL (ref 0–0.01)
NRBC BLD-RTO: 0 PER 100 WBC
PERFORMED BY, TECHID: ABNORMAL
PH UR STRIP: 6
PHOSPHATE SERPL-MCNC: 4.4 MG/DL (ref 2.6–4.7)
PLATELET # BLD AUTO: 394 K/UL (ref 150–400)
PMV BLD AUTO: 9.5 FL (ref 8.9–12.9)
POTASSIUM SERPL-SCNC: 6.1 MMOL/L (ref 3.5–5.1)
PROT SERPL-MCNC: 6.2 G/DL (ref 6.4–8.2)
PROT UR STRIP-MCNC: ABNORMAL MG/DL
RBC # BLD AUTO: 3.76 M/UL (ref 3.8–5.2)
RBC #/AREA URNS HPF: ABNORMAL /HPF (ref 0–5)
SODIUM SERPL-SCNC: 141 MMOL/L (ref 136–145)
SP GR UR REFRACTOMETRY: 1.01 (ref 1–1.03)
UROBILINOGEN UR QL STRIP.AUTO: 0.1 EU/DL (ref 0.2–1)
WBC # BLD AUTO: 14.7 K/UL (ref 3.6–11)
WBC URNS QL MICRO: ABNORMAL /HPF (ref 0–4)

## 2022-12-25 PROCEDURE — 96375 TX/PRO/DX INJ NEW DRUG ADDON: CPT

## 2022-12-25 PROCEDURE — 74011000250 HC RX REV CODE- 250: Performed by: INTERNAL MEDICINE

## 2022-12-25 PROCEDURE — 74011250636 HC RX REV CODE- 250/636: Performed by: INTERNAL MEDICINE

## 2022-12-25 PROCEDURE — G0378 HOSPITAL OBSERVATION PER HR: HCPCS

## 2022-12-25 PROCEDURE — 84100 ASSAY OF PHOSPHORUS: CPT

## 2022-12-25 PROCEDURE — 81001 URINALYSIS AUTO W/SCOPE: CPT

## 2022-12-25 PROCEDURE — 65270000029 HC RM PRIVATE

## 2022-12-25 PROCEDURE — 99285 EMERGENCY DEPT VISIT HI MDM: CPT

## 2022-12-25 PROCEDURE — 96365 THER/PROPH/DIAG IV INF INIT: CPT

## 2022-12-25 PROCEDURE — 87804 INFLUENZA ASSAY W/OPTIC: CPT

## 2022-12-25 PROCEDURE — 85025 COMPLETE CBC W/AUTO DIFF WBC: CPT

## 2022-12-25 PROCEDURE — 36415 COLL VENOUS BLD VENIPUNCTURE: CPT

## 2022-12-25 PROCEDURE — 93005 ELECTROCARDIOGRAM TRACING: CPT

## 2022-12-25 PROCEDURE — 83735 ASSAY OF MAGNESIUM: CPT

## 2022-12-25 PROCEDURE — 80053 COMPREHEN METABOLIC PANEL: CPT

## 2022-12-25 PROCEDURE — 96366 THER/PROPH/DIAG IV INF ADDON: CPT

## 2022-12-25 PROCEDURE — 74011250637 HC RX REV CODE- 250/637: Performed by: STUDENT IN AN ORGANIZED HEALTH CARE EDUCATION/TRAINING PROGRAM

## 2022-12-25 PROCEDURE — 74011250637 HC RX REV CODE- 250/637: Performed by: INTERNAL MEDICINE

## 2022-12-25 PROCEDURE — 71045 X-RAY EXAM CHEST 1 VIEW: CPT

## 2022-12-25 PROCEDURE — 87635 SARS-COV-2 COVID-19 AMP PRB: CPT

## 2022-12-25 PROCEDURE — 74011250636 HC RX REV CODE- 250/636: Performed by: STUDENT IN AN ORGANIZED HEALTH CARE EDUCATION/TRAINING PROGRAM

## 2022-12-25 PROCEDURE — 82962 GLUCOSE BLOOD TEST: CPT

## 2022-12-25 RX ORDER — SODIUM CHLORIDE 0.9 % (FLUSH) 0.9 %
5-40 SYRINGE (ML) INJECTION AS NEEDED
Status: DISCONTINUED | OUTPATIENT
Start: 2022-12-25 | End: 2022-12-26 | Stop reason: HOSPADM

## 2022-12-25 RX ORDER — ONDANSETRON 2 MG/ML
4 INJECTION INTRAMUSCULAR; INTRAVENOUS
Status: DISCONTINUED | OUTPATIENT
Start: 2022-12-25 | End: 2022-12-26 | Stop reason: HOSPADM

## 2022-12-25 RX ORDER — SODIUM CHLORIDE 0.9 % (FLUSH) 0.9 %
5-40 SYRINGE (ML) INJECTION EVERY 8 HOURS
Status: DISCONTINUED | OUTPATIENT
Start: 2022-12-25 | End: 2022-12-26 | Stop reason: HOSPADM

## 2022-12-25 RX ORDER — SODIUM CHLORIDE 9 MG/ML
125 INJECTION, SOLUTION INTRAVENOUS CONTINUOUS
Status: DISCONTINUED | OUTPATIENT
Start: 2022-12-25 | End: 2022-12-26 | Stop reason: HOSPADM

## 2022-12-25 RX ORDER — ACETAMINOPHEN 650 MG/1
650 SUPPOSITORY RECTAL
Status: DISCONTINUED | OUTPATIENT
Start: 2022-12-25 | End: 2022-12-26 | Stop reason: HOSPADM

## 2022-12-25 RX ORDER — CLOPIDOGREL BISULFATE 75 MG/1
75 TABLET ORAL DAILY
Status: DISCONTINUED | OUTPATIENT
Start: 2022-12-26 | End: 2022-12-26 | Stop reason: HOSPADM

## 2022-12-25 RX ORDER — AMLODIPINE BESYLATE 5 MG/1
5 TABLET ORAL DAILY
Status: DISCONTINUED | OUTPATIENT
Start: 2022-12-26 | End: 2022-12-26 | Stop reason: HOSPADM

## 2022-12-25 RX ORDER — GABAPENTIN 300 MG/1
300 CAPSULE ORAL
Status: DISCONTINUED | OUTPATIENT
Start: 2022-12-25 | End: 2022-12-26 | Stop reason: HOSPADM

## 2022-12-25 RX ORDER — TRAMADOL HYDROCHLORIDE 50 MG/1
50 TABLET ORAL
COMMUNITY

## 2022-12-25 RX ORDER — PROMETHAZINE HYDROCHLORIDE 25 MG/1
12.5 TABLET ORAL
Status: DISCONTINUED | OUTPATIENT
Start: 2022-12-25 | End: 2022-12-26 | Stop reason: HOSPADM

## 2022-12-25 RX ORDER — PANTOPRAZOLE SODIUM 40 MG/1
40 TABLET, DELAYED RELEASE ORAL DAILY
Status: DISCONTINUED | OUTPATIENT
Start: 2022-12-26 | End: 2022-12-26 | Stop reason: HOSPADM

## 2022-12-25 RX ORDER — ENOXAPARIN SODIUM 100 MG/ML
30 INJECTION SUBCUTANEOUS DAILY
Status: DISCONTINUED | OUTPATIENT
Start: 2022-12-26 | End: 2022-12-26 | Stop reason: HOSPADM

## 2022-12-25 RX ORDER — INSULIN GLARGINE 100 [IU]/ML
20 INJECTION, SOLUTION SUBCUTANEOUS DAILY
Status: DISCONTINUED | OUTPATIENT
Start: 2022-12-26 | End: 2022-12-26 | Stop reason: HOSPADM

## 2022-12-25 RX ORDER — FAMOTIDINE 40 MG/1
40 TABLET, FILM COATED ORAL DAILY
COMMUNITY

## 2022-12-25 RX ORDER — MAGNESIUM SULFATE HEPTAHYDRATE 40 MG/ML
2 INJECTION, SOLUTION INTRAVENOUS
Status: COMPLETED | OUTPATIENT
Start: 2022-12-25 | End: 2022-12-25

## 2022-12-25 RX ORDER — ACETAMINOPHEN 325 MG/1
650 TABLET ORAL
Status: DISCONTINUED | OUTPATIENT
Start: 2022-12-25 | End: 2022-12-26 | Stop reason: HOSPADM

## 2022-12-25 RX ORDER — ONDANSETRON 2 MG/ML
4 INJECTION INTRAMUSCULAR; INTRAVENOUS
Status: COMPLETED | OUTPATIENT
Start: 2022-12-25 | End: 2022-12-25

## 2022-12-25 RX ORDER — POLYETHYLENE GLYCOL 3350 17 G/17G
17 POWDER, FOR SOLUTION ORAL DAILY PRN
Status: DISCONTINUED | OUTPATIENT
Start: 2022-12-25 | End: 2022-12-26 | Stop reason: HOSPADM

## 2022-12-25 RX ORDER — GABAPENTIN 300 MG/1
300 CAPSULE ORAL 2 TIMES DAILY
Status: DISCONTINUED | OUTPATIENT
Start: 2022-12-25 | End: 2022-12-25

## 2022-12-25 RX ADMIN — SODIUM CHLORIDE 500 ML: 9 INJECTION, SOLUTION INTRAVENOUS at 16:24

## 2022-12-25 RX ADMIN — SODIUM ZIRCONIUM CYCLOSILICATE 10 G: 10 POWDER, FOR SUSPENSION ORAL at 16:51

## 2022-12-25 RX ADMIN — SODIUM CHLORIDE 125 ML/HR: 9 INJECTION, SOLUTION INTRAVENOUS at 18:35

## 2022-12-25 RX ADMIN — GABAPENTIN 300 MG: 300 CAPSULE ORAL at 22:49

## 2022-12-25 RX ADMIN — SODIUM CHLORIDE, PRESERVATIVE FREE 10 ML: 5 INJECTION INTRAVENOUS at 22:18

## 2022-12-25 RX ADMIN — ONDANSETRON 4 MG: 2 INJECTION INTRAMUSCULAR; INTRAVENOUS at 16:24

## 2022-12-25 RX ADMIN — MAGNESIUM SULFATE HEPTAHYDRATE 2 G: 40 INJECTION, SOLUTION INTRAVENOUS at 16:24

## 2022-12-25 NOTE — H&P
History & Physical    Primary Care Provider: Carmen Maya MD  Source of Information: Patient/family     History of Presenting Illness:   Melani Tejada is a 65 y.o. female who presents of nausea and vomiting which started this morning. She had multiple episodes of emesis throughout the day. She denies diarrhea. Some epigastric pain from the vomiting. She denies any unusual foods recently. No sick contacts. She had a similar episode in September while in [place].    In the ED her labs showed DONTE superimposed on chronic kidney disease as well as hyperkalemia with a potassium of 6.1. Review of Systems:  A comprehensive review of systems was negative except for that written in the History of Present Illness. Past Medical History:   Diagnosis Date    Chronic pain     Diabetes ([facility] [address]     Hypertension     Ovarian cyst     Removed Laproscopically        Past Surgical History:   Procedure Laterality Date    HX CHOLECYSTECTOMY      HX GYN      Hysterectomy, OOphrectomy and salpingoectomy in 2020    HX ORTHOPAEDIC      HX UROLOGICAL         Prior to Admission medications    Medication Sig Start Date End Date Taking?  Authorizing Provider   clopidogreL (PLAVIX) 75 mg tab TAKE 1 TABLET DAILY 12/19/22   Carmen Maya MD   pantoprazole (PROTONIX) 40 mg tablet TAKE 1 TABLET DAILY 12/19/22   Carmen Maya MD   metFORMIN (GLUCOPHAGE) 1,000 mg tablet TAKE 1 TABLET TWICE A DAY 12/19/22   Carmen Maya MD   simvastatin (ZOCOR) 20 mg tablet TAKE 1 TABLET NIGHTLY 12/19/22   Carmen Maya MD   ramipriL (ALTACE) 10 mg capsule TAKE 1 CAPSULE TWICE DAILY 12/19/22   Carmen Maya MD   amLODIPine (NORVASC) 5 mg tablet TAKE 1 TABLET DAILY 12/19/22   Carmen Maya MD   predniSONE (DELTASONE) 20 mg tablet  10/22/22   Provider, Historical   Dara Olszewski XR 11 mg Tb24 Take 1 tablet by mouth daily as directed by physician 11/9/22   Judith Curtis MD   gabapentin (NEURONTIN) 300 mg capsule TAKE 2 CAPSULES NIGHTLY. MAXIMUM DAILY AMOUNT:      600MG. 9/30/22   Tomi Garcia MD   meclizine (ANTIVERT) 12.5 mg tablet TAKE 1 TABLET 3 TIMES A DAYAS NEEDED FOR DIZZINESS 9/30/22   Tomi Garcia MD   insulin detemir U-100 (Levemir U-100 Insulin) 100 unit/mL injection 25 Units by SubCUTAneous route two (2) times a day. 7/7/22   Tomi Garcia MD   diclofenac (VOLTAREN) 1 % gel Apply 1 g to affected area four (4) times daily. Apply to knee 2/24/22   Tomi Garcia MD   OTHER Allergy plus patch from [facility]    Provider, Historical   Ferrous Fumarate 325 mg (106 mg iron) tab Take  by mouth. Provider, Historical   cyanocobalamin (VITAMIN B12) 500 mcg tablet Take 1 Tab by mouth daily. 10/7/20   Tomi Garcia MD   cholecalciferol (VITAMIN D3) (2,000 UNITS /50 MCG) cap capsule Take 2,000 Units by mouth daily. 10/7/20   Tomi Garcia MD   triamcinolone acetonide (KENALOG) 0.1 % ointment Apply  to affected area two (2) times a day. use thin layer on upper back  Patient not taking: No sig reported 8/20/20   Tomi Garcia MD   diclofenac EC (VOLTAREN) 75 mg EC tablet Take 75 mg by mouth daily. Provider, Historical   insulin aspart U-100 (NOVOLOG) 100 unit/mL injection by SubCUTAneous route as needed. Provider, Historical       Allergies   Allergen Reactions    Codeine Other (comments)    Sulfa (Sulfonamide Antibiotics) Other (comments)        No family history on file.      Social History     Socioeconomic History    Marital status:    Tobacco Use    Smoking status: Never    Smokeless tobacco: Never   Substance and Sexual Activity    Alcohol use: Never    Drug use: Never   Social History Narrative    Cosmotology school and course of Psychology            CODE STATUS:  DNR    Full    Other      Objective:     Physical Exam:     Visit Vitals  BP (!) 137/45   Pulse 83   Temp 98.7 °F (37.1 °C)   Resp 12   Ht 5' 5\" (1.651 m)   Wt 69.9 kg (154 lb)   SpO2 94%   BMI 25.63 kg/m²      O2 Device: None (Room air)    General:  Alert, cooperative, no distress, appears stated age. Head:  Normocephalic, without obvious abnormality, atraumatic. Eyes:  Conjunctivae/corneas clear. PERRL, EOMs intact. Nose: Nares normal. Septum midline. Mucosa normal. No drainage or sinus tenderness. Throat: Lips, mucosa, and tongue normal. Teeth and gums normal.   Neck: Supple, symmetrical, trachea midline, no adenopathy, thyroid: no enlargement/tenderness/nodules, no carotid bruit and no JVD. Back:   Symmetric, no curvature. ROM normal. No CVA tenderness. Lungs:   Clear to auscultation bilaterally. Chest wall:  No tenderness or deformity. Heart:  Regular rate and rhythm, S1, S2 normal, no murmur, click, rub or gallop. Abdomen:   Soft, non-tender. Bowel sounds normal. No masses,  No organomegaly. Extremities: Extremities normal, atraumatic, no cyanosis or edema. Pulses: 2+ and symmetric all extremities. Skin: Skin color, texture, turgor normal. No rashes or lesions   Neurologic: CNII-XII intact. No motor or sensory deficits. 24 Hour Results:    Recent Results (from the past 24 hour(s))   CBC WITH AUTOMATED DIFF    Collection Time: 12/25/22  2:56 PM   Result Value Ref Range    WBC 14.7 (H) 3.6 - 11.0 K/uL    RBC 3.76 (L) 3.80 - 5.20 M/uL    HGB 10.6 (L) 11.5 - 16.0 g/dL    HCT 33.2 (L) 35.0 - 47.0 %    MCV 88.3 80.0 - 99.0 FL    MCH 28.2 26.0 - 34.0 PG    MCHC 31.9 30.0 - 36.5 g/dL    RDW 16.7 (H) 11.5 - 14.5 %    PLATELET 464 214 - 183 K/uL    MPV 9.5 8.9 - 12.9 FL    NRBC 0.0 0.0  WBC    ABSOLUTE NRBC 0.00 0.00 - 0.01 K/uL    NEUTROPHILS 82 (H) 32 - 75 %    LYMPHOCYTES 9 (L) 12 - 49 %    MONOCYTES 8 5 - 13 %    EOSINOPHILS 0 0 - 7 %    BASOPHILS 0 0 - 1 %    IMMATURE GRANULOCYTES 1 (H) 0 - 0.5 %    ABS. NEUTROPHILS 12.0 (H) 1.8 - 8.0 K/UL    ABS. LYMPHOCYTES 1.3 0.8 - 3.5 K/UL    ABS. MONOCYTES 1.1 (H) 0.0 - 1.0 K/UL    ABS.  EOSINOPHILS 0.1 0.0 - 0.4 K/UL    ABS. BASOPHILS 0.0 0.0 - 0.1 K/UL    ABS. IMM. GRANS. 0.1 (H) 0.00 - 0.04 K/UL    DF AUTOMATED     METABOLIC PANEL, COMPREHENSIVE    Collection Time: 12/25/22  2:56 PM   Result Value Ref Range    Sodium 141 136 - 145 mmol/L    Potassium 6.1 (H) 3.5 - 5.1 mmol/L    Chloride 109 (H) 97 - 108 mmol/L    CO2 27 21 - 32 mmol/L    Anion gap 5 5 - 15 mmol/L    Glucose 139 (H) 65 - 100 mg/dL    BUN 21 (H) 6 - 20 mg/dL    Creatinine 2.23 (H) 0.55 - 1.02 mg/dL    BUN/Creatinine ratio 9 (L) 12 - 20      eGFR 22 (L) >60 ml/min/1.73m2    Calcium 9.3 8.5 - 10.1 mg/dL    Bilirubin, total 0.2 0.2 - 1.0 mg/dL    AST (SGOT) 19 15 - 37 U/L    ALT (SGPT) 20 12 - 78 U/L    Alk.  phosphatase 71 45 - 117 U/L    Protein, total 6.2 (L) 6.4 - 8.2 g/dL    Albumin 2.8 (L) 3.5 - 5.0 g/dL    Globulin 3.4 2.0 - 4.0 g/dL    A-G Ratio 0.8 (L) 1.1 - 2.2     MAGNESIUM    Collection Time: 12/25/22  2:56 PM   Result Value Ref Range    Magnesium 1.4 (L) 1.6 - 2.4 mg/dL   PHOSPHORUS    Collection Time: 12/25/22  2:56 PM   Result Value Ref Range    Phosphorus 4.4 2.6 - 4.7 mg/dL   COVID-19 RAPID TEST    Collection Time: 12/25/22  2:56 PM   Result Value Ref Range    COVID-19 rapid test Not Detected Not Detected     INFLUENZA A & B AG (RAPID TEST)    Collection Time: 12/25/22  2:56 PM   Result Value Ref Range    Influenza A Antigen Negative Negative      Influenza B Antigen Negative Negative     URINALYSIS W/ RFLX MICROSCOPIC    Collection Time: 12/25/22  4:31 PM   Result Value Ref Range    Color Yellow/Straw      Appearance Clear Clear      Specific gravity 1.010 1.003 - 1.030      pH (UA) 6.0      Protein Trace (A) Negative mg/dL    Glucose Negative Negative mg/dL    Ketone Negative Negative mg/dL    Bilirubin Small (A) Negative      Blood Negative Negative      Urobilinogen 0.1 (L) 0.2 - 1.0 EU/dL    Nitrites Negative Negative      Leukocyte Esterase Negative Negative     URINE MICROSCOPIC    Collection Time: 12/25/22  4:31 PM   Result Value Ref Range    WBC 0-4 0 - 4 /hpf    RBC 0-5 0 - 5 /hpf    Bacteria Negative Negative /hpf    Mucus Trace (A) Negative /lpf         Imaging:   XR CHEST PORT   Final Result      No acute findings. Assessment:   Likely acute viral gastroenteritis    Dehydration    Acute kidney injury superimposed on chronic kidney disease stage III likely due to dehydration    Hyperkalemia due to DONTE    Osteoarthritis, on Xeljanz      Plan:   As inpatient to medical telemetry  Hydrate with normal saline  Antiemetics   Hold ACE inhibitor and NSAIDs  Keep n.p.o. tonight, start clear liquids in a.m.   Follow labs      Home medications were reviewed    Signed By: Abbe Mcginnis MD     December 25, 2022

## 2022-12-26 VITALS
TEMPERATURE: 98.4 F | RESPIRATION RATE: 17 BRPM | WEIGHT: 154 LBS | SYSTOLIC BLOOD PRESSURE: 125 MMHG | OXYGEN SATURATION: 94 % | BODY MASS INDEX: 25.66 KG/M2 | HEART RATE: 80 BPM | DIASTOLIC BLOOD PRESSURE: 58 MMHG | HEIGHT: 65 IN

## 2022-12-26 LAB
ANION GAP SERPL CALC-SCNC: 7 MMOL/L (ref 5–15)
ATRIAL RATE: 83 BPM
BASOPHILS # BLD: 0 K/UL (ref 0–0.1)
BASOPHILS NFR BLD: 0 % (ref 0–1)
BUN SERPL-MCNC: 22 MG/DL (ref 6–20)
BUN/CREAT SERPL: 11 (ref 12–20)
CA-I BLD-MCNC: 8.1 MG/DL (ref 8.5–10.1)
CALCULATED P AXIS, ECG09: 60 DEGREES
CALCULATED R AXIS, ECG10: -12 DEGREES
CALCULATED T AXIS, ECG11: 66 DEGREES
CHLORIDE SERPL-SCNC: 110 MMOL/L (ref 97–108)
CO2 SERPL-SCNC: 26 MMOL/L (ref 21–32)
CREAT SERPL-MCNC: 2.02 MG/DL (ref 0.55–1.02)
DIAGNOSIS, 93000: NORMAL
DIFFERENTIAL METHOD BLD: ABNORMAL
EOSINOPHIL # BLD: 0.1 K/UL (ref 0–0.4)
EOSINOPHIL NFR BLD: 1 % (ref 0–7)
ERYTHROCYTE [DISTWIDTH] IN BLOOD BY AUTOMATED COUNT: 16.9 % (ref 11.5–14.5)
GLUCOSE BLD STRIP.AUTO-MCNC: 128 MG/DL (ref 65–100)
GLUCOSE SERPL-MCNC: 125 MG/DL (ref 65–100)
HCT VFR BLD AUTO: 26.6 % (ref 35–47)
HGB BLD-MCNC: 8.5 G/DL (ref 11.5–16)
IMM GRANULOCYTES # BLD AUTO: 0.1 K/UL (ref 0–0.04)
IMM GRANULOCYTES NFR BLD AUTO: 1 % (ref 0–0.5)
LYMPHOCYTES # BLD: 1.2 K/UL (ref 0.8–3.5)
LYMPHOCYTES NFR BLD: 12 % (ref 12–49)
MCH RBC QN AUTO: 28 PG (ref 26–34)
MCHC RBC AUTO-ENTMCNC: 32 G/DL (ref 30–36.5)
MCV RBC AUTO: 87.5 FL (ref 80–99)
MONOCYTES # BLD: 0.9 K/UL (ref 0–1)
MONOCYTES NFR BLD: 9 % (ref 5–13)
NEUTS SEG # BLD: 8 K/UL (ref 1.8–8)
NEUTS SEG NFR BLD: 77 % (ref 32–75)
NRBC # BLD: 0 K/UL (ref 0–0.01)
NRBC BLD-RTO: 0 PER 100 WBC
P-R INTERVAL, ECG05: 136 MS
PERFORMED BY, TECHID: ABNORMAL
PLATELET # BLD AUTO: 310 K/UL (ref 150–400)
PMV BLD AUTO: 9.7 FL (ref 8.9–12.9)
POTASSIUM SERPL-SCNC: 4.9 MMOL/L (ref 3.5–5.1)
Q-T INTERVAL, ECG07: 388 MS
QRS DURATION, ECG06: 66 MS
QTC CALCULATION (BEZET), ECG08: 455 MS
RBC # BLD AUTO: 3.04 M/UL (ref 3.8–5.2)
SODIUM SERPL-SCNC: 143 MMOL/L (ref 136–145)
VENTRICULAR RATE, ECG03: 83 BPM
WBC # BLD AUTO: 10.2 K/UL (ref 3.6–11)

## 2022-12-26 PROCEDURE — 85025 COMPLETE CBC W/AUTO DIFF WBC: CPT

## 2022-12-26 PROCEDURE — 96372 THER/PROPH/DIAG INJ SC/IM: CPT

## 2022-12-26 PROCEDURE — 74011636637 HC RX REV CODE- 636/637: Performed by: INTERNAL MEDICINE

## 2022-12-26 PROCEDURE — G0378 HOSPITAL OBSERVATION PER HR: HCPCS

## 2022-12-26 PROCEDURE — 82962 GLUCOSE BLOOD TEST: CPT

## 2022-12-26 PROCEDURE — 74011250637 HC RX REV CODE- 250/637: Performed by: INTERNAL MEDICINE

## 2022-12-26 PROCEDURE — 74011000250 HC RX REV CODE- 250: Performed by: INTERNAL MEDICINE

## 2022-12-26 PROCEDURE — 36415 COLL VENOUS BLD VENIPUNCTURE: CPT

## 2022-12-26 PROCEDURE — 80048 BASIC METABOLIC PNL TOTAL CA: CPT

## 2022-12-26 PROCEDURE — 74011250636 HC RX REV CODE- 250/636: Performed by: INTERNAL MEDICINE

## 2022-12-26 RX ADMIN — PANTOPRAZOLE SODIUM 40 MG: 40 TABLET, DELAYED RELEASE ORAL at 09:18

## 2022-12-26 RX ADMIN — SODIUM CHLORIDE 125 ML/HR: 9 INJECTION, SOLUTION INTRAVENOUS at 04:07

## 2022-12-26 RX ADMIN — CLOPIDOGREL BISULFATE 75 MG: 75 TABLET ORAL at 09:18

## 2022-12-26 RX ADMIN — SODIUM CHLORIDE, PRESERVATIVE FREE 10 ML: 5 INJECTION INTRAVENOUS at 05:38

## 2022-12-26 RX ADMIN — INSULIN GLARGINE 20 UNITS: 100 INJECTION, SOLUTION SUBCUTANEOUS at 09:18

## 2022-12-26 RX ADMIN — ENOXAPARIN SODIUM 30 MG: 100 INJECTION SUBCUTANEOUS at 09:18

## 2022-12-26 RX ADMIN — AMLODIPINE BESYLATE 5 MG: 5 TABLET ORAL at 09:17

## 2022-12-26 NOTE — ED PROVIDER NOTES
EMERGENCY DEPARTMENT HISTORY AND PHYSICAL EXAM      Date: 12/25/2022  Patient Name: Marlon Hastings    History of Presenting Illness     Chief Complaint   Patient presents with    Syncope       History Provided By: Patient    HPI: Marlon Hastings, 65 y.o. female with past medical history as reviewed below presents for evaluation of vomiting and presyncope. Patient states that she was in her normal state of health this morning when she went up to go to the bathroom suddenly felt nauseous, then vomited. She went back to bed and rest over the next 2 hours. She woke up and attempted to the bathroom again, but again vomited. This time she felt dizzy while vomiting, sat down on the toilet and felt like she might pass out. No syncope loss of consciousness. This is happened to her before and has previously been without explanation. She feels much better now but she has not attempted to eat or drink anything. No fevers or chills, no diarrhea or dysuria. No recent sick symptoms or contacts. There are no other complaints, changes, or physical findings at this time. Past History     Past Medical History:  Past Medical History:   Diagnosis Date    Chronic pain     Diabetes ([address]     Hypertension     Ovarian cyst     Removed Laproscopically       Past Surgical History:  Past Surgical History:   Procedure Laterality Date    HX CHOLECYSTECTOMY      HX GYN      Hysterectomy, OOphrectomy and salpingoectomy in 2020    HX ORTHOPAEDIC      HX UROLOGICAL         Family History:  No family history on file. Social History:  Social History     Tobacco Use    Smoking status: Never    Smokeless tobacco: Never   Substance Use Topics    Alcohol use: Never    Drug use: Never       Allergies: Allergies   Allergen Reactions    Codeine Other (comments)    Sulfa (Sulfonamide Antibiotics) Other (comments)       PCP: Eirc Smallwood MD    No current facility-administered medications on file prior to encounter.      Current Outpatient Medications on File Prior to Encounter   Medication Sig Dispense Refill    traMADoL (ULTRAM) 50 mg tablet Take 50 mg by mouth every eight (8) hours as needed for Pain.      famotidine (PEPCID) 40 mg tablet Take 40 mg by mouth daily. clopidogreL (PLAVIX) 75 mg tab TAKE 1 TABLET DAILY 90 Tablet 0    metFORMIN (GLUCOPHAGE) 1,000 mg tablet TAKE 1 TABLET TWICE A  Tablet 0    simvastatin (ZOCOR) 20 mg tablet TAKE 1 TABLET NIGHTLY 90 Tablet 0    ramipriL (ALTACE) 10 mg capsule TAKE 1 CAPSULE TWICE DAILY 180 Capsule 0    amLODIPine (NORVASC) 5 mg tablet TAKE 1 TABLET DAILY 90 Tablet 0    Xeljanz XR 11 mg Tb24 Take 1 tablet by mouth daily as directed by physician 90 Tablet 0    gabapentin (NEURONTIN) 300 mg capsule TAKE 2 CAPSULES NIGHTLY. MAXIMUM DAILY AMOUNT:      600MG. (Patient taking differently: Take 300 mg by mouth nightly.) 180 Capsule 0    meclizine (ANTIVERT) 12.5 mg tablet TAKE 1 TABLET 3 TIMES A DAYAS NEEDED FOR DIZZINESS 90 Tablet 1    insulin detemir U-100 (Levemir U-100 Insulin) 100 unit/mL injection 25 Units by SubCUTAneous route two (2) times a day. 45 mL 1    insulin aspart U-100 (NOVOLOG) 100 unit/mL injection by SubCUTAneous route as needed. pantoprazole (PROTONIX) 40 mg tablet TAKE 1 TABLET DAILY (Patient not taking: Reported on 12/25/2022) 90 Tablet 0    predniSONE (DELTASONE) 20 mg tablet  (Patient not taking: Reported on 12/25/2022)      diclofenac (VOLTAREN) 1 % gel Apply 1 g to affected area four (4) times daily. Apply to knee 200 g 1    [DISCONTINUED] aspirin 81 mg chewable tablet Take 81 mg by mouth daily. (Patient not taking: No sig reported)      OTHER Allergy plus patch from [facility]      [DISCONTINUED] aspirin/caffeine (BC ARTHRITIS PO) Take  by mouth two (2) times daily as needed. (Patient not taking: No sig reported)      Ferrous Fumarate 325 mg (106 mg iron) tab Take  by mouth.      cyanocobalamin (VITAMIN B12) 500 mcg tablet Take 1 Tab by mouth daily.  90 Tab 1 cholecalciferol (VITAMIN D3) (2,000 UNITS /50 MCG) cap capsule Take 2,000 Units by mouth daily. 90 Cap 1    triamcinolone acetonide (KENALOG) 0.1 % ointment Apply  to affected area two (2) times a day. use thin layer on upper back (Patient not taking: No sig reported) 80 g 1    diclofenac EC (VOLTAREN) 75 mg EC tablet Take 75 mg by mouth daily. [DISCONTINUED] famotidine (PEPCID) 40 mg tablet Take 40 mg by mouth daily. (Patient not taking: No sig reported)         Review of Systems   Review of Systems   Constitutional:  Negative for fever. HENT:  Negative for congestion. Respiratory:  Negative for cough and shortness of breath. Cardiovascular:  Negative for chest pain. Gastrointestinal:  Positive for vomiting. Negative for abdominal pain, constipation and nausea. Genitourinary:  Negative for dysuria. Musculoskeletal:  Negative for arthralgias and myalgias. Skin:  Negative for rash. Allergic/Immunologic: Negative for immunocompromised state. Neurological:  Negative for syncope. Psychiatric/Behavioral:  Negative for confusion. Physical Exam   Physical Exam  Constitutional:       Appearance: Normal appearance. HENT:      Head: Normocephalic and atraumatic. Nose: Nose normal.      Mouth/Throat:      Mouth: Mucous membranes are moist.   Eyes:      Conjunctiva/sclera: Conjunctivae normal.      Pupils: Pupils are equal, round, and reactive to light. Cardiovascular:      Rate and Rhythm: Normal rate and regular rhythm. Heart sounds: Normal heart sounds. Pulmonary:      Effort: Pulmonary effort is normal.      Breath sounds: Normal breath sounds. Abdominal:      General: There is no distension. Palpations: Abdomen is soft. Tenderness: There is no abdominal tenderness. Musculoskeletal:         General: No tenderness or deformity. Normal range of motion. Cervical back: Normal range of motion and neck supple. Skin:     General: Skin is warm and dry. Neurological:      General: No focal deficit present. Mental Status: She is alert and oriented to person, place, and time. Psychiatric:         Mood and Affect: Mood normal.         Behavior: Behavior normal.        Lab and Diagnostic Study Results   Labs -     Recent Results (from the past 12 hour(s))   CBC WITH AUTOMATED DIFF    Collection Time: 12/25/22  2:56 PM   Result Value Ref Range    WBC 14.7 (H) 3.6 - 11.0 K/uL    RBC 3.76 (L) 3.80 - 5.20 M/uL    HGB 10.6 (L) 11.5 - 16.0 g/dL    HCT 33.2 (L) 35.0 - 47.0 %    MCV 88.3 80.0 - 99.0 FL    MCH 28.2 26.0 - 34.0 PG    MCHC 31.9 30.0 - 36.5 g/dL    RDW 16.7 (H) 11.5 - 14.5 %    PLATELET 768 618 - 875 K/uL    MPV 9.5 8.9 - 12.9 FL    NRBC 0.0 0.0  WBC    ABSOLUTE NRBC 0.00 0.00 - 0.01 K/uL    NEUTROPHILS 82 (H) 32 - 75 %    LYMPHOCYTES 9 (L) 12 - 49 %    MONOCYTES 8 5 - 13 %    EOSINOPHILS 0 0 - 7 %    BASOPHILS 0 0 - 1 %    IMMATURE GRANULOCYTES 1 (H) 0 - 0.5 %    ABS. NEUTROPHILS 12.0 (H) 1.8 - 8.0 K/UL    ABS. LYMPHOCYTES 1.3 0.8 - 3.5 K/UL    ABS. MONOCYTES 1.1 (H) 0.0 - 1.0 K/UL    ABS. EOSINOPHILS 0.1 0.0 - 0.4 K/UL    ABS. BASOPHILS 0.0 0.0 - 0.1 K/UL    ABS. IMM. GRANS. 0.1 (H) 0.00 - 0.04 K/UL    DF AUTOMATED     METABOLIC PANEL, COMPREHENSIVE    Collection Time: 12/25/22  2:56 PM   Result Value Ref Range    Sodium 141 136 - 145 mmol/L    Potassium 6.1 (H) 3.5 - 5.1 mmol/L    Chloride 109 (H) 97 - 108 mmol/L    CO2 27 21 - 32 mmol/L    Anion gap 5 5 - 15 mmol/L    Glucose 139 (H) 65 - 100 mg/dL    BUN 21 (H) 6 - 20 mg/dL    Creatinine 2.23 (H) 0.55 - 1.02 mg/dL    BUN/Creatinine ratio 9 (L) 12 - 20      eGFR 22 (L) >60 ml/min/1.73m2    Calcium 9.3 8.5 - 10.1 mg/dL    Bilirubin, total 0.2 0.2 - 1.0 mg/dL    AST (SGOT) 19 15 - 37 U/L    ALT (SGPT) 20 12 - 78 U/L    Alk.  phosphatase 71 45 - 117 U/L    Protein, total 6.2 (L) 6.4 - 8.2 g/dL    Albumin 2.8 (L) 3.5 - 5.0 g/dL    Globulin 3.4 2.0 - 4.0 g/dL    A-G Ratio 0.8 (L) 1.1 - 2.2 MAGNESIUM    Collection Time: 12/25/22  2:56 PM   Result Value Ref Range    Magnesium 1.4 (L) 1.6 - 2.4 mg/dL   PHOSPHORUS    Collection Time: 12/25/22  2:56 PM   Result Value Ref Range    Phosphorus 4.4 2.6 - 4.7 mg/dL   COVID-19 RAPID TEST    Collection Time: 12/25/22  2:56 PM   Result Value Ref Range    COVID-19 rapid test Not Detected Not Detected     INFLUENZA A & B AG (RAPID TEST)    Collection Time: 12/25/22  2:56 PM   Result Value Ref Range    Influenza A Antigen Negative Negative      Influenza B Antigen Negative Negative     URINALYSIS W/ RFLX MICROSCOPIC    Collection Time: 12/25/22  4:31 PM   Result Value Ref Range    Color Yellow/Straw      Appearance Clear Clear      Specific gravity 1.010 1.003 - 1.030      pH (UA) 6.0      Protein Trace (A) Negative mg/dL    Glucose Negative Negative mg/dL    Ketone Negative Negative mg/dL    Bilirubin Small (A) Negative      Blood Negative Negative      Urobilinogen 0.1 (L) 0.2 - 1.0 EU/dL    Nitrites Negative Negative      Leukocyte Esterase Negative Negative     URINE MICROSCOPIC    Collection Time: 12/25/22  4:31 PM   Result Value Ref Range    WBC 0-4 0 - 4 /hpf    RBC 0-5 0 - 5 /hpf    Bacteria Negative Negative /hpf    Mucus Trace (A) Negative /lpf       Radiologic Studies -   @lastxrresult@  CT Results  (Last 48 hours)      None          CXR Results  (Last 48 hours)                 12/25/22 1500  XR CHEST PORT Final result    Impression:      No acute findings. Narrative:  EXAM:  XR CHEST PORT       INDICATION: presyncope       COMPARISON: none       TECHNIQUE: Upright portable chest AP view       FINDINGS:        Partially visualized cervical spine hardware. Cholecystectomy clips. Cardiomediastinal silhouette is within normal limits. Lungs and pleural spaces   are grossly clear.                    Medical Decision Making and ED Course   Differential Diagnosis & Medical Decision Making Provider Note:   50-year-old female presents for evaluation of vomiting and presyncopal symptoms. She is well-appearing now with normal vitals. Differential includes gastritis, viral syndrome, uti. Dehydration. Vasovagal presyncope. Will evaluate broadly with CBC, CMP, UA, chest x-ray. Treating with IV fluids. - I am the first provider for this patient. I reviewed the vital signs, available nursing notes, past medical history, past surgical history, family history and social history. The patients presenting problems have been discussed, and they are in agreement with the care plan formulated and outlined with them. I have encouraged them to ask questions as they arise throughout their visit. Vital Signs-Reviewed the patient's vital signs. Patient Vitals for the past 12 hrs:   Temp Pulse Resp BP SpO2   12/25/22 2028 99.2 °F (37.3 °C) 94 14 (!) 147/56 95 %   12/25/22 1900 -- 81 -- (!) 131/51 95 %   12/25/22 1813 -- 83 12 (!) 137/45 94 %   12/25/22 1658 -- 81 18 (!) 141/51 93 %   12/25/22 1613 -- 71 15 (!) 139/50 96 %   12/25/22 1558 -- 73 -- (!) 141/49 98 %   12/25/22 1515 -- 74 17 (!) 146/48 96 %   12/25/22 1443 98.7 °F (37.1 °C) 84 18 (!) 129/47 99 %       ED Course:   ED Course as of 12/25/22 2045   Sun Dec 25, 2022   1554 ECG performed at 1454 and interpreted by me shows normal sinus rhythm with a ventricular rate of 84, normal intervals, no ST elevation or depression [BQ]      ED Course User Index  [BQ] French Krishna MD     Patient failed attempting to p.o. even with Zofran. Labs notable for DONTE and dehydration. Will admit for nausea and vomiting with inability tolerate p.o. and dehydration    Procedures   Performed by: Sweta Cunningham MD  Procedures      Disposition   Disposition: Admitted to Floor Medical Floor the case was discussed with the admitting physician Lobo    Diagnosis/Clinical Impression     Clinical Impression:   1. Gastritis and duodenitis    2.  Dehydration        Attestations: Indigo Wright MD, am the primary clinician of record. Please note that this dictation was completed with [employer], the computer voice recognition software. Quite often unanticipated grammatical, syntax, homophones, and other interpretive errors are inadvertently transcribed by the computer software. Please disregard these errors. Please excuse any errors that have escaped final proofreading. Thank you.

## 2022-12-26 NOTE — DISCHARGE SUMMARY
Physician Discharge Summary     Patient ID:    Rudi Tse  [id number]  62 y.o.  1945    Admit date: 12/25/2022    Discharge date : 12/26/2022      Final Diagnoses:   Viral gastroenteritis [A08.4]  Dehydration  Acute kidney injury superimposed on chronic kidney disease stage III likely due to dehydration  Hyperkalemia due to DONTE  Osteoarthritis, on [place]       Reason for Hospitalization:  Rudi Tse is a 65 y.o. female who presents of nausea and vomiting which started this morning. She had multiple episodes of emesis throughout the day. She denies diarrhea. Some epigastric pain from the vomiting. She denies any unusual foods recently. No sick contacts. She had a similar episode in September while in [place].     In the ED her labs showed DONTE superimposed on chronic kidney disease as well as hyperkalemia with a potassium of 6.1. Hospital Course:   Patient was admitted overnight as observation. She was started on IV fluids and antiemetics. She had no vomiting following admission to the floor. The next day her creatinine had improved to 2 and potassium had normalized    She was feeling much better and felt stable for discharge home              Discharge Medications:   Current Discharge Medication List        CONTINUE these medications which have NOT CHANGED    Details   traMADoL (ULTRAM) 50 mg tablet Take 50 mg by mouth every eight (8) hours as needed for Pain.      famotidine (PEPCID) 40 mg tablet Take 40 mg by mouth daily.       clopidogreL (PLAVIX) 75 mg tab TAKE 1 TABLET DAILY  Qty: 90 Tablet, Refills: 0      metFORMIN (GLUCOPHAGE) 1,000 mg tablet TAKE 1 TABLET TWICE A DAY  Qty: 180 Tablet, Refills: 0      simvastatin (ZOCOR) 20 mg tablet TAKE 1 TABLET NIGHTLY  Qty: 90 Tablet, Refills: 0      ramipriL (ALTACE) 10 mg capsule TAKE 1 CAPSULE TWICE DAILY  Qty: 180 Capsule, Refills: 0      amLODIPine (NORVASC) 5 mg tablet TAKE 1 TABLET DAILY  Qty: 90 Tablet, Refills: 0    Associated Diagnoses: Essential hypertension      Xeljanz XR 11 mg Tb24 Take 1 tablet by mouth daily as directed by physician  Qty: 90 Tablet, Refills: 0    Comments: TYOUNG Refill  Associated Diagnoses: Seronegative rheumatoid arthritis (HCC)      gabapentin (NEURONTIN) 300 mg capsule TAKE 2 CAPSULES NIGHTLY. MAXIMUM DAILY AMOUNT:      600MG. Qty: 180 Capsule, Refills: 0    Associated Diagnoses: Neuropathic pain      meclizine (ANTIVERT) 12.5 mg tablet TAKE 1 TABLET 3 TIMES A DAYAS NEEDED FOR DIZZINESS  Qty: 90 Tablet, Refills: 1    Associated Diagnoses: Benign paroxysmal positional vertigo, unspecified laterality      insulin detemir U-100 (Levemir U-100 Insulin) 100 unit/mL injection 25 Units by SubCUTAneous route two (2) times a day. Qty: 45 mL, Refills: 1    Associated Diagnoses: Type 2 diabetes mellitus with stage 2 chronic kidney disease, with long-term current use of insulin ([facility])      insulin aspart U-100 (NOVOLOG) 100 unit/mL injection by SubCUTAneous route as needed. predniSONE (DELTASONE) 20 mg tablet       diclofenac (VOLTAREN) 1 % gel Apply 1 g to affected area four (4) times daily. Apply to knee  Qty: 200 g, Refills: 1    Associated Diagnoses: Acute pain of right knee      OTHER Allergy plus patch from [facility]      Ferrous Fumarate 325 mg (106 mg iron) tab Take  by mouth.      cyanocobalamin (VITAMIN B12) 500 mcg tablet Take 1 Tab by mouth daily. Qty: 90 Tab, Refills: 1    Associated Diagnoses: Bilateral calf pain      cholecalciferol (VITAMIN D3) (2,000 UNITS /50 MCG) cap capsule Take 2,000 Units by mouth daily.   Qty: 90 Cap, Refills: 1    Associated Diagnoses: Bilateral calf pain           STOP taking these medications       pantoprazole (PROTONIX) 40 mg tablet Comments:   Reason for Stopping:         triamcinolone acetonide (KENALOG) 0.1 % ointment Comments:   Reason for Stopping:         diclofenac EC (VOLTAREN) 75 mg EC tablet Comments:   Reason for Stopping: Follow up Care:    1. Malcolm Okeefe MD in 1-2 weeks. Please call to set up an appointment shortly after discharge. Diet:  Regular Diet    Disposition:  Home. Advanced Directive:   FULL    DNR      Discharge Exam:  General:  Alert, cooperative, no distress, appears stated age. Lungs:   Clear to auscultation bilaterally. Chest wall:  No tenderness or deformity. Heart:  Regular rate and rhythm, S1, S2 normal, no murmur, click, rub or gallop. Abdomen:   Soft, non-tender. Bowel sounds normal. No masses,  No organomegaly. Extremities: Extremities normal, atraumatic, no cyanosis or edema. Pulses: 2+ and symmetric all extremities. Skin: Skin color, texture, turgor normal. No rashes or lesions   Neurologic: CNII-XII intact. No gross sensory or motor deficits        CONSULTATIONS: None    Significant Diagnostic Studies:   12/25/2022: BUN 21 mg/dL (H; Ref range: 6 - 20 mg/dL); Calcium 9.3 mg/dL (Ref range: 8.5 - 10.1 mg/dL); CO2 27 mmol/L (Ref range: 21 - 32 mmol/L); Creatinine 2.23 mg/dL (H; Ref range: 0.55 - 1.02 mg/dL); Glucose 139 mg/dL (H; Ref range: 65 - 100 mg/dL); HCT 33.2 % (L; Ref range: 35.0 - 47.0 %); HGB 10.6 g/dL (L; Ref range: 11.5 - 16.0 g/dL); Potassium 6.1 mmol/L (H; Ref range: 3.5 - 5.1 mmol/L); Sodium 141 mmol/L (Ref range: 136 - 145 mmol/L)  12/26/2022: BUN 22 mg/dL (H; Ref range: 6 - 20 mg/dL); Calcium 8.1 mg/dL (L; Ref range: 8.5 - 10.1 mg/dL); CO2 26 mmol/L (Ref range: 21 - 32 mmol/L); Creatinine 2.02 mg/dL (H; Ref range: 0.55 - 1.02 mg/dL); Glucose 125 mg/dL (H; Ref range: 65 - 100 mg/dL); HCT 26.6 % (L; Ref range: 35.0 - 47.0 %); HGB 8.5 g/dL (L; Ref range: 11.5 - 16.0 g/dL); Potassium 4.9 mmol/L (Ref range: 3.5 - 5.1 mmol/L);  Sodium 143 mmol/L (Ref range: 136 - 145 mmol/L)  Recent Labs     12/26/22  0621 12/25/22  1456   WBC 10.2 14.7*   HGB 8.5* 10.6*   HCT 26.6* 33.2*    394     Recent Labs     12/26/22  0621 12/25/22  1456    141   K 4.9 6.1* * 109*   CO2 26 27   BUN 22* 21*   CREA 2.02* 2.23*   * 139*   CA 8.1* 9.3   MG  --  1.4*   PHOS  --  4.4     Recent Labs     12/25/22  1456   ALT 20   AP 71   TBILI 0.2   TP 6.2*   ALB 2.8*   GLOB 3.4     No results for input(s): INR, PTP, APTT, INREXT in the last 72 hours. No results for input(s): FE, TIBC, PSAT, FERR in the last 72 hours. No results for input(s): PH, PCO2, PO2 in the last 72 hours. No results for input(s): CPK, CKMB in the last 72 hours.     No lab exists for component: TROPONINI  Lab Results   Component Value Date/Time    Glucose (POC) 128 (H) 12/26/2022 07:43 AM    Glucose (POC) 137 (H) 12/25/2022 08:53 PM    Glucose (POC) 74 11/23/2022 11:21 AM       Discharge time spent 35 minutes    Signed:  Ahmet Osorio MD  12/26/2022  11:14 AM

## 2022-12-26 NOTE — PROGRESS NOTES
Problem: General Medical Care Plan  Goal: *Vital signs within specified parameters  12/26/2022 1241 by George Jacques, RN  Outcome: Resolved/Met  12/26/2022 1155 by George Jacques, RN  Outcome: Progressing Towards Goal  Goal: *Labs within defined limits  12/26/2022 1241 by George Jacques, RN  Outcome: Resolved/Met  12/26/2022 1155 by George Jacques, RN  Outcome: Progressing Towards Goal  Goal: *Absence of infection signs and symptoms  12/26/2022 1241 by George Jacques, RN  Outcome: Resolved/Met  12/26/2022 1155 by George Jacques, RN  Outcome: Progressing Towards Goal  Goal: *Optimal pain control at patient's stated goal  12/26/2022 1241 by George Jacques, RN  Outcome: Resolved/Met  12/26/2022 1155 by George Jacques, RN  Outcome: Progressing Towards Goal  Goal: *Skin integrity maintained  12/26/2022 1241 by George Jacques, RN  Outcome: Resolved/Met  12/26/2022 1155 by George Jacques, RN  Outcome: Progressing Towards Goal  Goal: *Fluid volume balance  12/26/2022 1241 by George Jacques, RN  Outcome: Resolved/Met  12/26/2022 1155 by George Jacques, RN  Outcome: Progressing Towards Goal  Goal: *Optimize nutritional status  12/26/2022 1241 by George Jacques, RN  Outcome: Resolved/Met  12/26/2022 1155 by George Jacques, RN  Outcome: Progressing Towards Goal  Goal: *Anxiety reduced or absent  12/26/2022 1241 by George Jacques, RN  Outcome: Resolved/Met  12/26/2022 1155 by George Jacques, RN  Outcome: Progressing Towards Goal  Goal: *Progressive mobility and function (eg: ADL's)  12/26/2022 1241 by George Jacques, RN  Outcome: Resolved/Met  12/26/2022 1155 by George Jacques, RN  Outcome: Progressing Towards Goal     Problem: Patient Education: Go to Patient Education Activity  Goal: Patient/Family Education  12/26/2022 1241 by George Jacques, RN  Outcome: Resolved/Met  12/26/2022 1155 by George Jacques, RN  Outcome: Progressing Towards Goal     Problem: Falls - Risk of  Goal: *Absence of Falls  Description: Tello Mathis Fall Risk and appropriate interventions in the flowsheet.   12/26/2022 1241 by Minor Fitzgerald RN  Outcome: Resolved/Met  12/26/2022 1155 by Minor Fitzgerald RN  Outcome: Progressing Towards Goal  Note: Fall Risk Interventions:  Mobility Interventions: Bed/chair exit alarm, Patient to call before getting OOB         Medication Interventions: Bed/chair exit alarm, Evaluate medications/consider consulting pharmacy, Teach patient to arise slowly    Elimination Interventions: Bed/chair exit alarm, Call light in reach              Problem: Patient Education: Go to Patient Education Activity  Goal: Patient/Family Education  12/26/2022 1241 by Minor Fitzgerald RN  Outcome: Resolved/Met  12/26/2022 1155 by Minor Fitzgerald RN  Outcome: Progressing Towards Goal     Problem: Patient Education:  Go to Education Activity  Goal: Patient/Family Education  Outcome: Resolved/Met     Problem: Pediatric Gastroenteritis: Day 1  Goal: Off Pathway (Use only if patient is Off Pathway)  Outcome: Resolved/Met  Goal: Activity/Safety  Outcome: Resolved/Met  Goal: Consults, if ordered  Outcome: Resolved/Met  Goal: Diagnostic Test/Procedures  Outcome: Resolved/Met  Goal: Nutrition/Diet  Outcome: Resolved/Met  Goal: Discharge Planning  Outcome: Resolved/Met  Goal: Medications  Outcome: Resolved/Met  Goal: Treatments/Interventions/Procedures  Outcome: Resolved/Met  Goal: Psychosocial  Outcome: Resolved/Met  Goal: *Optimal pain control at patient's stated goal  Outcome: Resolved/Met  Goal: *Vital signs within defined limits  Outcome: Resolved/Met  Goal: *Labs within defined limits  Outcome: Resolved/Met  Goal: *Tolerating diet  Outcome: Resolved/Met     Problem: Pediatric Gastroenteritis: Day 2  Goal: Off Pathway (Use only if patient is Off Pathway)  Outcome: Resolved/Met  Goal: Activity/Safety  Outcome: Resolved/Met  Goal: Consults, if ordered  Outcome: Resolved/Met  Goal: Diagnostic Test/Procedures  Outcome: Resolved/Met  Goal: Nutrition/Diet  Outcome: Resolved/Met  Goal: Discharge Planning  Outcome: Resolved/Met  Goal: Medications  Outcome: Resolved/Met  Goal: Treatments/Interventions/Procedures  Outcome: Resolved/Met  Goal: Psychosocial  Outcome: Resolved/Met  Goal: *Optimal pain control at patient's stated goal  Outcome: Resolved/Met  Goal: *Vital signs within defined limits  Outcome: Resolved/Met  Goal: *Labs within defined limits  Outcome: Resolved/Met  Goal: *Tolerating diet  Outcome: Resolved/Met     Problem: Pediatric Gastroenteritis: Day 3  Goal: Off Pathway (Use only if patient is Off Pathway)  Outcome: Resolved/Met  Goal: Activity/Safety  Outcome: Resolved/Met  Goal: Consults, if ordered  Outcome: Resolved/Met  Goal: Diagnostic Test/Procedures  Outcome: Resolved/Met  Goal: Nutrition/Diet  Outcome: Resolved/Met  Goal: Discharge Planning  Outcome: Resolved/Met  Goal: Medications  Outcome: Resolved/Met  Goal: Treatments/Interventions/Procedures  Outcome: Resolved/Met  Goal: Psychosocial  Outcome: Resolved/Met     Problem: Pediatric Gastroenteritis: Discharge Outcomes  Goal: *Demonstrates progressive activity  Outcome: Resolved/Met  Goal: *Tolerating diet  Outcome: Resolved/Met  Goal: *Vital signs within defined limits  Outcome: Resolved/Met  Goal: *Labs within defined limits  Outcome: Resolved/Met  Goal: *Optimal pain control at patient's stated goal  Outcome: Resolved/Met

## 2022-12-26 NOTE — PROGRESS NOTES
Problem: General Medical Care Plan  Goal: *Vital signs within specified parameters  Outcome: Progressing Towards Goal  Goal: *Labs within defined limits  Outcome: Progressing Towards Goal  Goal: *Absence of infection signs and symptoms  Outcome: Progressing Towards Goal  Goal: *Optimal pain control at patient's stated goal  Outcome: Progressing Towards Goal  Goal: *Skin integrity maintained  Outcome: Progressing Towards Goal  Goal: *Fluid volume balance  Outcome: Progressing Towards Goal  Goal: *Optimize nutritional status  Outcome: Progressing Towards Goal  Goal: *Anxiety reduced or absent  Outcome: Progressing Towards Goal  Goal: *Progressive mobility and function (eg: ADL's)  Outcome: Progressing Towards Goal     Problem: Patient Education: Go to Patient Education Activity  Goal: Patient/Family Education  Outcome: Progressing Towards Goal     Problem: Falls - Risk of  Goal: *Absence of Falls  Description: Document Jaspal Fall Risk and appropriate interventions in the flowsheet.   Outcome: Progressing Towards Goal  Note: Fall Risk Interventions:  Mobility Interventions: Bed/chair exit alarm, Patient to call before getting OOB         Medication Interventions: Bed/chair exit alarm, Evaluate medications/consider consulting pharmacy, Teach patient to arise slowly    Elimination Interventions: Bed/chair exit alarm, Call light in reach              Problem: Patient Education: Go to Patient Education Activity  Goal: Patient/Family Education  Outcome: Progressing Towards Goal

## 2022-12-27 ENCOUNTER — TELEPHONE (OUTPATIENT)
Dept: FAMILY MEDICINE CLINIC | Age: 77
End: 2022-12-27

## 2022-12-27 NOTE — TELEPHONE ENCOUNTER
Pt's dtr states the Pt was in [facility] due to an intestinal infection. Pt has follow up on 1-12. Please advise if Dr can fit Pt in sooner.

## 2022-12-30 ENCOUNTER — OFFICE VISIT (OUTPATIENT)
Dept: FAMILY MEDICINE CLINIC | Age: 77
End: 2022-12-30
Payer: MEDICARE

## 2022-12-30 VITALS
DIASTOLIC BLOOD PRESSURE: 70 MMHG | RESPIRATION RATE: 16 BRPM | BODY MASS INDEX: 26.59 KG/M2 | TEMPERATURE: 97.6 F | SYSTOLIC BLOOD PRESSURE: 186 MMHG | OXYGEN SATURATION: 100 % | WEIGHT: 159.8 LBS | HEART RATE: 68 BPM

## 2022-12-30 DIAGNOSIS — E11.22 TYPE 2 DIABETES MELLITUS WITH STAGE 2 CHRONIC KIDNEY DISEASE, WITH LONG-TERM CURRENT USE OF INSULIN (HCC): Chronic | ICD-10-CM

## 2022-12-30 DIAGNOSIS — N18.2 TYPE 2 DIABETES MELLITUS WITH STAGE 2 CHRONIC KIDNEY DISEASE, WITH LONG-TERM CURRENT USE OF INSULIN (HCC): Chronic | ICD-10-CM

## 2022-12-30 DIAGNOSIS — G89.29 OTHER CHRONIC PAIN: ICD-10-CM

## 2022-12-30 DIAGNOSIS — R11.14 BILIOUS VOMITING WITH NAUSEA: ICD-10-CM

## 2022-12-30 DIAGNOSIS — M25.561 ACUTE PAIN OF RIGHT KNEE: ICD-10-CM

## 2022-12-30 DIAGNOSIS — R10.30 LOWER ABDOMINAL PAIN: Primary | ICD-10-CM

## 2022-12-30 DIAGNOSIS — Z79.4 TYPE 2 DIABETES MELLITUS WITH STAGE 2 CHRONIC KIDNEY DISEASE, WITH LONG-TERM CURRENT USE OF INSULIN (HCC): Chronic | ICD-10-CM

## 2022-12-30 DIAGNOSIS — I10 ESSENTIAL HYPERTENSION: Chronic | ICD-10-CM

## 2022-12-30 DIAGNOSIS — Z09 HOSPITAL DISCHARGE FOLLOW-UP: ICD-10-CM

## 2022-12-30 DIAGNOSIS — N18.30 STAGE 3 CHRONIC KIDNEY DISEASE, UNSPECIFIED WHETHER STAGE 3A OR 3B CKD (HCC): ICD-10-CM

## 2022-12-30 PROCEDURE — 1123F ACP DISCUSS/DSCN MKR DOCD: CPT | Performed by: FAMILY MEDICINE

## 2022-12-30 PROCEDURE — 1101F PT FALLS ASSESS-DOCD LE1/YR: CPT | Performed by: FAMILY MEDICINE

## 2022-12-30 PROCEDURE — 3078F DIAST BP <80 MM HG: CPT | Performed by: FAMILY MEDICINE

## 2022-12-30 PROCEDURE — G8427 DOCREV CUR MEDS BY ELIG CLIN: HCPCS | Performed by: FAMILY MEDICINE

## 2022-12-30 PROCEDURE — G8536 NO DOC ELDER MAL SCRN: HCPCS | Performed by: FAMILY MEDICINE

## 2022-12-30 PROCEDURE — 1090F PRES/ABSN URINE INCON ASSESS: CPT | Performed by: FAMILY MEDICINE

## 2022-12-30 PROCEDURE — 99214 OFFICE O/P EST MOD 30 MIN: CPT | Performed by: FAMILY MEDICINE

## 2022-12-30 PROCEDURE — G8510 SCR DEP NEG, NO PLAN REQD: HCPCS | Performed by: FAMILY MEDICINE

## 2022-12-30 PROCEDURE — 1111F DSCHRG MED/CURRENT MED MERGE: CPT | Performed by: FAMILY MEDICINE

## 2022-12-30 PROCEDURE — 3051F HG A1C>EQUAL 7.0%<8.0%: CPT | Performed by: FAMILY MEDICINE

## 2022-12-30 PROCEDURE — 3074F SYST BP LT 130 MM HG: CPT | Performed by: FAMILY MEDICINE

## 2022-12-30 PROCEDURE — G8417 CALC BMI ABV UP PARAM F/U: HCPCS | Performed by: FAMILY MEDICINE

## 2022-12-30 RX ORDER — DICLOFENAC SODIUM 10 MG/G
1 GEL TOPICAL 4 TIMES DAILY
Qty: 200 G | Refills: 1 | Status: SHIPPED | OUTPATIENT
Start: 2022-12-30

## 2022-12-30 NOTE — PROGRESS NOTES
1. Have you been to the ER, urgent care clinic since your last visit? Hospitalized since your last visit? Yes When: [facility]     2. Have you seen or consulted any other health care providers outside of the [address] [place] [address] since your last visit? Include any pap smears or colon screening. No    3. For patients aged 39-70: Has the patient had a colonoscopy / FIT/ Cologuard? NA - based on age    If the patient is female:    4. For patients aged 41-77: Has the patient had a mammogram within the past 2 years? NA - based on age or sex      11. For patients aged 21-65: Has the patient had a pap smear? NA - based on age or sex     Reviewed record in preparation for visit and have necessary documentation  Pt did not bring medication to office visit for review  Patient is accompanied by daughter I have received verbal consent from Kathleen Caba to discuss any/all medical information while they are present in the room.     Goals that were addressed and/or need to be completed during or after this appointment include     Health Maintenance Due   Topic Date Due    DTaP/Tdap/Td series (1 - Tdap) Never done    COVID-19 Vaccine (3 - Booster for Moderna series) 07/14/2021    Flu Vaccine (1) 08/01/2022    Foot Exam Q1  10/13/2022

## 2022-12-30 NOTE — PROGRESS NOTES
[facility]    History of Present Illness:   Melani Tejada is a 65 y.o. female with history of HTN, TIA, DM, Chronic Pain, Rheumatoid Arthritis  CC: Follow up ER  History provided by patient and Records    HPI:  Patient reports that she ended up in the with nausea, and \"yellow Vomiting\" that started maria elena day. Ended up in the ER and noted to have DONTE, Hyperkalemia, Nausea, and vomiting. Initial concern for viral gastroenteritis, but per daughter of patient this appears to happen every 5-6 months for several years now. Concerned for some sort of biliary issue (patient did have a gall bladder removed). Patient would like to see Dr. Tiffanie Recinos. Diabetes Follow up: Overall the patient feels well with good energy level. Current Medications:   Key Antihyperglycemic Medications               metFORMIN (GLUCOPHAGE) 1,000 mg tablet (Taking) TAKE 1 TABLET TWICE A DAY    insulin detemir U-100 (Levemir U-100 Insulin) 100 unit/mL injection (Taking) 25 Units by SubCUTAneous route two (2) times a day. insulin aspart U-100 (NOVOLOG) 100 unit/mL injection (Taking) by SubCUTAneous route as needed. .   Insulin dependence: Yes   Frequency of home glucose testing: Daily   Blood Sugar range at home: Elevated    Last eye exam: In past 12 months. Last foot exam: This year.    Polyuria, polyphagia or polydipsia: NO   Retinopathy: NO   Neuropathy SX: NO   Low blood sugar symptoms: NO   Dietary compliance: compliant most of the time   Medication compliance: compliant most of the time   On ASA: NO   Tobacco Use: NO   Depression: NO     Wt Readings from Last 3 Encounters:   12/30/22 159 lb 12.8 oz (72.5 kg)   12/25/22 154 lb (69.9 kg)   11/23/22 154 lb 6.4 oz (70 kg)        Lab Results   Component Value Date/Time    Hemoglobin A1c 7.3 (H) 07/11/2022 09:09 AM        Lab Results   Component Value Date/Time    Microalbumin/Creat ratio (mg/g creat) 64 (H) 10/13/2021 11:55 AM    Microalbumin,urine random 20.40 10/13/2021 11:55 AM         Lab Results   Component Value Date/Time    Creatinine (POC) 1.35 (H) 11/23/2022 11:21 AM    Creatinine 2.02 (H) 12/26/2022 06:21 AM                 Health Maintenance  Health Maintenance Due   Topic Date Due    DTaP/Tdap/Td series (1 - Tdap) Never done    COVID-19 Vaccine (3 - Booster for Moderna series) 07/14/2021    Flu Vaccine (1) 08/01/2022    Foot Exam Q1  10/13/2022       Past Medical, Family, and Social History:     Current Outpatient Medications on File Prior to Visit   Medication Sig Dispense Refill    traMADoL (ULTRAM) 50 mg tablet Take 50 mg by mouth every eight (8) hours as needed for Pain.      famotidine (PEPCID) 40 mg tablet Take 40 mg by mouth daily. clopidogreL (PLAVIX) 75 mg tab TAKE 1 TABLET DAILY 90 Tablet 0    metFORMIN (GLUCOPHAGE) 1,000 mg tablet TAKE 1 TABLET TWICE A  Tablet 0    simvastatin (ZOCOR) 20 mg tablet TAKE 1 TABLET NIGHTLY 90 Tablet 0    ramipriL (ALTACE) 10 mg capsule TAKE 1 CAPSULE TWICE DAILY 180 Capsule 0    amLODIPine (NORVASC) 5 mg tablet TAKE 1 TABLET DAILY 90 Tablet 0    Xeljanz XR 11 mg Tb24 Take 1 tablet by mouth daily as directed by physician 90 Tablet 0    gabapentin (NEURONTIN) 300 mg capsule TAKE 2 CAPSULES NIGHTLY. MAXIMUM DAILY AMOUNT:      600MG. (Patient taking differently: Take 300 mg by mouth nightly.) 180 Capsule 0    meclizine (ANTIVERT) 12.5 mg tablet TAKE 1 TABLET 3 TIMES A DAYAS NEEDED FOR DIZZINESS 90 Tablet 1    insulin detemir U-100 (Levemir U-100 Insulin) 100 unit/mL injection 25 Units by SubCUTAneous route two (2) times a day. 45 mL 1    diclofenac (VOLTAREN) 1 % gel Apply 1 g to affected area four (4) times daily. Apply to knee 200 g 1    OTHER Allergy plus patch from [employer]      Ferrous Fumarate 325 mg (106 mg iron) tab Take  by mouth.      cyanocobalamin (VITAMIN B12) 500 mcg tablet Take 1 Tab by mouth daily.  90 Tab 1    cholecalciferol (VITAMIN D3) (2,000 UNITS /50 MCG) cap capsule Take 2,000 Units by mouth daily. 90 Cap 1    insulin aspart U-100 (NOVOLOG) 100 unit/mL injection by SubCUTAneous route as needed. predniSONE (DELTASONE) 20 mg tablet  (Patient not taking: No sig reported)       No current facility-administered medications on file prior to visit. Patient Active Problem List   Diagnosis Code    Chronic pain G89.29    Type 2 diabetes mellitus with stage 2 chronic kidney disease, with long-term current use of insulin ([facility]) E11.22, N18.2, Z79.4    Essential hypertension I10    Type 2 diabetes mellitus with diabetic neuropathy ([facility] [address] E11.40    Hx of transient ischemic attack (TIA) Z86.73    Osteoporosis M81.0    Arthritis M19.90    Seronegative rheumatoid arthritis ([facility]) M06.00    Gastroesophageal reflux disease without esophagitis K21.9    White coat syndrome with diagnosis of hypertension I10    Chronic renal disease, stage III N18.30    Viral gastroenteritis A08.4       Social History     Socioeconomic History    Marital status:    Tobacco Use    Smoking status: Never    Smokeless tobacco: Never   Substance and Sexual Activity    Alcohol use: Never    Drug use: Never   Social History Narrative    [employer]ology school and course of Psychology        Review of Systems   Review of Systems   Constitutional:  Negative for chills and fever. Cardiovascular:  Negative for chest pain and palpitations. Gastrointestinal:  Negative for nausea and vomiting. Neurological:  Negative for dizziness, tingling and headaches. Objective:   Visit Vitals  BP (!) 197/67   Pulse 68   Temp 97.6 °F (36.4 °C) (Oral)   Resp 16   Wt 159 lb 12.8 oz (72.5 kg)   SpO2 100%   BMI 26.59 kg/m²        Physical Exam  Vitals and nursing note reviewed. Constitutional:       Appearance: Normal appearance. HENT:      Head: Normocephalic and atraumatic. Cardiovascular:      Rate and Rhythm: Normal rate and regular rhythm. Pulses: Normal pulses. Heart sounds: Normal heart sounds. No murmur heard.     No friction rub. No gallop. Pulmonary:      Effort: Pulmonary effort is normal.      Breath sounds: Normal breath sounds. Abdominal:      General: Abdomen is flat. Bowel sounds are normal.      Palpations: Abdomen is soft. Musculoskeletal:         General: Normal range of motion. Cervical back: Normal range of motion and neck supple. Skin:     General: Skin is warm and dry. Neurological:      Mental Status: She is alert. Pertinent Labs/Studies:      Assessment and orders:       ICD-10-CM ICD-9-CM    1. Lower abdominal pain  R10.30 789.09 REFERRAL TO GASTROENTEROLOGY      2. Bilious vomiting with nausea  R11.14 787.04 REFERRAL TO GASTROENTEROLOGY      3. Type 2 diabetes mellitus with stage 2 chronic kidney disease, with long-term current use of insulin ([facility])  E11.22 250.40 HEMOGLOBIN A1C WITH EAG    N18.2 585.2     Z79.4 V58.67       4. Essential hypertension  I10 401.9 CBC W/O DIFF      5. Stage 3 chronic kidney disease, unspecified whether stage 3a or 3b CKD ([facility])  E80.14 320.6 METABOLIC PANEL, COMPREHENSIVE      6. Other chronic pain  G89.29 338.29       7. Hospital discharge follow-up  Z09 V67.59 NV DISCHRG MEDS RECONCILED W/CURRENT MED LIST        Diagnoses and all orders for this visit:    1. Lower abdominal pain: Concern for biliary issues possibly, Referral to GI.  -     REFERRAL TO GASTROENTEROLOGY    2. Bilious vomiting with nausea  -     REFERRAL TO GASTROENTEROLOGY    3. Type 2 diabetes mellitus with stage 2 chronic kidney disease, with long-term current use of insulin [facility]): Discussed with patient today that the goal for their diabetes is to have a HgA1C<7 and ideally as close to 6.5 as possible. We discussed diet and medications. The goal for the cholesterol LDL is less than 70 and HDL>40. Patient is aware of the need for yearly eye exams and to take care of their feet daily.   Discussed with patient that blood pressure should be less than 130/80 and watching salt intake is very important.    -     HEMOGLOBIN A1C WITH EAG; Future    4. Essential hypertension: Our goal is to normalize the blood pressure to decrease the risks of strokes and heart attacks. The patient is in agreement with the plan. -     CBC W/O DIFF; Future    5. Stage 3 chronic kidney disease, unspecified whether stage 3a or 3b CKD (HCC)  -     METABOLIC PANEL, COMPREHENSIVE; Future    6. Hospital discharge follow-up  -     [facility] [email or web address] MEDS RECONCILED W/CURRENT MED LIST    7. Chronic Pain: Gabapentin 300 mg PRN    Follow-up and Dispositions    Return in about 3 months (around 3/30/2023). I have discussed the diagnosis with the patient and the intended plan as seen in the above orders. Social history, medical history, and labs were reviewed. The patient has received an after-visit summary and questions were answered concerning future plans. I have discussed medication side effects and warnings with the patient as well.     MD HOUSTON Vasquez & ALONDRA WOODS [facility]  12/30/22

## 2022-12-31 LAB
ALBUMIN SERPL-MCNC: 3.4 G/DL (ref 3.5–5)
ALBUMIN/GLOB SERPL: 1 {RATIO} (ref 1.1–2.2)
ALP SERPL-CCNC: 70 U/L (ref 45–117)
ALT SERPL-CCNC: 20 U/L (ref 12–78)
ANION GAP SERPL CALC-SCNC: 4 MMOL/L (ref 5–15)
AST SERPL-CCNC: 19 U/L (ref 15–37)
BILIRUB SERPL-MCNC: 0.2 MG/DL (ref 0.2–1)
BUN SERPL-MCNC: 14 MG/DL (ref 6–20)
BUN/CREAT SERPL: 10 (ref 12–20)
CALCIUM SERPL-MCNC: 9.7 MG/DL (ref 8.5–10.1)
CHLORIDE SERPL-SCNC: 106 MMOL/L (ref 97–108)
CO2 SERPL-SCNC: 29 MMOL/L (ref 21–32)
CREAT SERPL-MCNC: 1.37 MG/DL (ref 0.55–1.02)
ERYTHROCYTE [DISTWIDTH] IN BLOOD BY AUTOMATED COUNT: 16.7 % (ref 11.5–14.5)
EST. AVERAGE GLUCOSE BLD GHB EST-MCNC: 160 MG/DL
GLOBULIN SER CALC-MCNC: 3.3 G/DL (ref 2–4)
GLUCOSE SERPL-MCNC: 112 MG/DL (ref 65–100)
HBA1C MFR BLD: 7.2 % (ref 4–5.6)
HCT VFR BLD AUTO: 29.4 % (ref 35–47)
HGB BLD-MCNC: 9.2 G/DL (ref 11.5–16)
MCH RBC QN AUTO: 28 PG (ref 26–34)
MCHC RBC AUTO-ENTMCNC: 31.3 G/DL (ref 30–36.5)
MCV RBC AUTO: 89.4 FL (ref 80–99)
NRBC # BLD: 0 K/UL (ref 0–0.01)
NRBC BLD-RTO: 0 PER 100 WBC
PLATELET # BLD AUTO: 340 K/UL (ref 150–400)
PMV BLD AUTO: 10.6 FL (ref 8.9–12.9)
POTASSIUM SERPL-SCNC: 5.4 MMOL/L (ref 3.5–5.1)
PROT SERPL-MCNC: 6.7 G/DL (ref 6.4–8.2)
RBC # BLD AUTO: 3.29 M/UL (ref 3.8–5.2)
SODIUM SERPL-SCNC: 139 MMOL/L (ref 136–145)
WBC # BLD AUTO: 7.8 K/UL (ref 3.6–11)

## 2023-01-09 ENCOUNTER — PATIENT MESSAGE (OUTPATIENT)
Dept: FAMILY MEDICINE CLINIC | Age: 78
End: 2023-01-09

## 2023-01-15 ENCOUNTER — PATIENT MESSAGE (OUTPATIENT)
Dept: FAMILY MEDICINE CLINIC | Age: 78
End: 2023-01-15

## 2023-01-15 DIAGNOSIS — Z79.4 TYPE 2 DIABETES MELLITUS WITH STAGE 2 CHRONIC KIDNEY DISEASE, WITH LONG-TERM CURRENT USE OF INSULIN (HCC): ICD-10-CM

## 2023-01-15 DIAGNOSIS — E11.22 TYPE 2 DIABETES MELLITUS WITH STAGE 2 CHRONIC KIDNEY DISEASE, WITH LONG-TERM CURRENT USE OF INSULIN (HCC): ICD-10-CM

## 2023-01-15 DIAGNOSIS — N18.2 TYPE 2 DIABETES MELLITUS WITH STAGE 2 CHRONIC KIDNEY DISEASE, WITH LONG-TERM CURRENT USE OF INSULIN (HCC): ICD-10-CM

## 2023-01-16 ENCOUNTER — TELEPHONE (OUTPATIENT)
Dept: FAMILY MEDICINE CLINIC | Age: 78
End: 2023-01-16

## 2023-01-16 DIAGNOSIS — Z79.4 TYPE 2 DIABETES MELLITUS WITH STAGE 2 CHRONIC KIDNEY DISEASE, WITH LONG-TERM CURRENT USE OF INSULIN (HCC): ICD-10-CM

## 2023-01-16 DIAGNOSIS — E11.22 TYPE 2 DIABETES MELLITUS WITH STAGE 2 CHRONIC KIDNEY DISEASE, WITH LONG-TERM CURRENT USE OF INSULIN (HCC): ICD-10-CM

## 2023-01-16 DIAGNOSIS — N18.2 TYPE 2 DIABETES MELLITUS WITH STAGE 2 CHRONIC KIDNEY DISEASE, WITH LONG-TERM CURRENT USE OF INSULIN (HCC): ICD-10-CM

## 2023-01-16 NOTE — TELEPHONE ENCOUNTER
From: Julisa Hickman  To: Shaquille Ospina MD  Sent: 1/15/2023 8:22 PM EST  Subject: Elizabeth Newer    Dr Stewart Owusu, I forgot to order Warner Pulse at the first of the month can you please send her script to the [facility] in [place] so she can pick it up on Monday? 90 day supply please.   Thank you so much!!

## 2023-01-16 NOTE — TELEPHONE ENCOUNTER
Pt daughter states mom need Levemier U-100 insulin ASAP called into [facility] in [place]. .     Please call Daughter Ninoska Gomez) to confirmed weather it can be called in today.

## 2023-01-16 NOTE — TELEPHONE ENCOUNTER
Insulin ordered for patient.     MD HOUSTON Carrillo & ALONDRA WOODS [facility]  01/16/23

## 2023-03-31 LAB
ALBUMIN SERPL-MCNC: 3.7 G/DL (ref 3.5–5)
ALBUMIN/GLOB SERPL: 1.2 (ref 1.1–2.2)
ALP SERPL-CCNC: 52 U/L (ref 45–117)
ALT SERPL-CCNC: 17 U/L (ref 12–78)
ANION GAP SERPL CALC-SCNC: 4 MMOL/L (ref 5–15)
AST SERPL-CCNC: 14 U/L (ref 15–37)
BASOPHILS # BLD: 0 K/UL (ref 0–0.1)
BASOPHILS NFR BLD: 0 % (ref 0–1)
BILIRUB SERPL-MCNC: 0.2 MG/DL (ref 0.2–1)
BUN SERPL-MCNC: 18 MG/DL (ref 6–20)
BUN/CREAT SERPL: 8 (ref 12–20)
CALCIUM SERPL-MCNC: 9.4 MG/DL (ref 8.5–10.1)
CHLORIDE SERPL-SCNC: 107 MMOL/L (ref 97–108)
CO2 SERPL-SCNC: 26 MMOL/L (ref 21–32)
CREAT SERPL-MCNC: 2.19 MG/DL (ref 0.55–1.02)
CRP SERPL-MCNC: <0.29 MG/DL (ref 0–0.6)
DIFFERENTIAL METHOD BLD: ABNORMAL
EOSINOPHIL # BLD: 0.1 K/UL (ref 0–0.4)
EOSINOPHIL NFR BLD: 1 % (ref 0–7)
ERYTHROCYTE [DISTWIDTH] IN BLOOD BY AUTOMATED COUNT: 15.4 % (ref 11.5–14.5)
ERYTHROCYTE [SEDIMENTATION RATE] IN BLOOD: 32 MM/HR (ref 0–30)
GLOBULIN SER CALC-MCNC: 3 G/DL (ref 2–4)
GLUCOSE SERPL-MCNC: 61 MG/DL (ref 65–100)
HCT VFR BLD AUTO: 29.8 % (ref 35–47)
HGB BLD-MCNC: 9.1 G/DL (ref 11.5–16)
IMM GRANULOCYTES # BLD AUTO: 0 K/UL (ref 0–0.04)
IMM GRANULOCYTES NFR BLD AUTO: 0 % (ref 0–0.5)
LYMPHOCYTES # BLD: 1.9 K/UL (ref 0.8–3.5)
LYMPHOCYTES NFR BLD: 25 % (ref 12–49)
MCH RBC QN AUTO: 28 PG (ref 26–34)
MCHC RBC AUTO-ENTMCNC: 30.5 G/DL (ref 30–36.5)
MCV RBC AUTO: 91.7 FL (ref 80–99)
MONOCYTES # BLD: 0.7 K/UL (ref 0–1)
MONOCYTES NFR BLD: 9 % (ref 5–13)
NEUTS SEG # BLD: 5.1 K/UL (ref 1.8–8)
NEUTS SEG NFR BLD: 65 % (ref 32–75)
NRBC # BLD: 0 K/UL (ref 0–0.01)
NRBC BLD-RTO: 0 PER 100 WBC
PLATELET # BLD AUTO: 401 K/UL (ref 150–400)
PMV BLD AUTO: 10 FL (ref 8.9–12.9)
POTASSIUM SERPL-SCNC: 5.4 MMOL/L (ref 3.5–5.1)
PROT SERPL-MCNC: 6.7 G/DL (ref 6.4–8.2)
RBC # BLD AUTO: 3.25 M/UL (ref 3.8–5.2)
SODIUM SERPL-SCNC: 137 MMOL/L (ref 136–145)
WBC # BLD AUTO: 7.8 K/UL (ref 3.6–11)

## 2023-04-17 ENCOUNTER — OFFICE VISIT (OUTPATIENT)
Dept: RHEUMATOLOGY | Age: 78
End: 2023-04-17
Payer: MEDICARE

## 2023-04-17 VITALS
BODY MASS INDEX: 25.79 KG/M2 | TEMPERATURE: 98.8 F | OXYGEN SATURATION: 97 % | DIASTOLIC BLOOD PRESSURE: 46 MMHG | WEIGHT: 155 LBS | SYSTOLIC BLOOD PRESSURE: 193 MMHG | HEART RATE: 84 BPM | RESPIRATION RATE: 16 BRPM

## 2023-04-17 DIAGNOSIS — M81.0 AGE-RELATED OSTEOPOROSIS WITHOUT CURRENT PATHOLOGICAL FRACTURE: ICD-10-CM

## 2023-04-17 DIAGNOSIS — Z79.899 ONGOING USE OF POSSIBLY TOXIC MEDICATION: ICD-10-CM

## 2023-04-17 DIAGNOSIS — M06.00 SERONEGATIVE RHEUMATOID ARTHRITIS (HCC): Primary | ICD-10-CM

## 2023-04-17 DIAGNOSIS — M15.4 EROSIVE OSTEOARTHRITIS OF BOTH HANDS: ICD-10-CM

## 2023-04-17 PROCEDURE — G8417 CALC BMI ABV UP PARAM F/U: HCPCS | Performed by: INTERNAL MEDICINE

## 2023-04-17 PROCEDURE — G8427 DOCREV CUR MEDS BY ELIG CLIN: HCPCS | Performed by: INTERNAL MEDICINE

## 2023-04-17 PROCEDURE — 1123F ACP DISCUSS/DSCN MKR DOCD: CPT | Performed by: INTERNAL MEDICINE

## 2023-04-17 PROCEDURE — 1090F PRES/ABSN URINE INCON ASSESS: CPT | Performed by: INTERNAL MEDICINE

## 2023-04-17 PROCEDURE — G8432 DEP SCR NOT DOC, RNG: HCPCS | Performed by: INTERNAL MEDICINE

## 2023-04-17 PROCEDURE — 1101F PT FALLS ASSESS-DOCD LE1/YR: CPT | Performed by: INTERNAL MEDICINE

## 2023-04-17 PROCEDURE — G8536 NO DOC ELDER MAL SCRN: HCPCS | Performed by: INTERNAL MEDICINE

## 2023-04-17 PROCEDURE — 99215 OFFICE O/P EST HI 40 MIN: CPT | Performed by: INTERNAL MEDICINE

## 2023-04-17 PROCEDURE — 3078F DIAST BP <80 MM HG: CPT | Performed by: INTERNAL MEDICINE

## 2023-04-17 PROCEDURE — 3077F SYST BP >= 140 MM HG: CPT | Performed by: INTERNAL MEDICINE

## 2023-04-17 RX ORDER — ALENDRONATE SODIUM 70 MG/1
70 TABLET ORAL
Qty: 12 TABLET | Refills: 3 | Status: SHIPPED | OUTPATIENT
Start: 2023-04-17

## 2023-04-17 NOTE — PROGRESS NOTES
REASON FOR VISIT:    is a 65 y.o. female with history of chronic low back pain, CKD, osteoporosis, and erosive osteoarthritis of the hands with superimposed seronegative rheumatoid arthritis, returning for followup. HISTORY OF PRESENT ILLNESS    Pt returns for a follow up. Last visit 11/16/2022, at which time we had continued Xeljanz and Prolia    No joint complaints. Still feels her  has improved, no recent pain or swelling. No extended morning stiffness. Blood pressure always high in-office due to white coat syndrome, says BP last week at gastroenterologist's was 122 systolic. No interval heart complaints or DVT. Remains on clopidogrel. Takes simvastatin, last LDL was 56 last summer. No associated myalgias. No interval falls. Interested in stopping Prolia due to $300 copays for each shot. Tolerated last Prolia shot well in November. She has GERD, which is symptomatically well-controlled with Pepcid. Does not believe she has tried Fosamax previously.     REVIEW OF SYSTEMS  Negatives as follows:  Dev Alberto:    Denies unexplained persistent fevers, weight change, chronic fatigue  HEAD/EYES:              Denies eye redness, blurry vision or sudden loss of vision, dry eyes, HA, temporal pain  ENT:                            Denies oral/nasal ulcers, recurrent sinus infections, dry mouth  RESPIRATORY:         No pleuritic pain, history of pleural effusions, hemoptysis, exertional dyspnea  CARDIOVASCULAR:             Denies chest pain, history of pericardial effusions  GASTRO:                    Denies heartburn, esophageal dysmotility, abdominal pain, nausea, vomiting, diarrhea, blood in the stool  HEMATOLOGIC:        No easy bruising, purpura, swollen lymph nodes  SKIN:                           Denies alopecia, ulcers, nodules, sun sensitivity, unexplained persistent rash   VASCULAR:                Denies edema, cyanosis, raynaud phenomenon  NEUROLOGIC:           +paresthesias in hands and feet Denies specific muscle weakness  PSYCHIATRIC:           No sleep disturbance / snoring, depression, anxiety  MSK:                           +extended stiffness, persistent joint pain      Review of systems is as above and is otherwise negative. ALLERGIES  Codeine and Sulfa (sulfonamide antibiotics)    MEDICATIONS  Current Outpatient Medications   Medication Sig    insulin detemir U-100 (Levemir U-100 Insulin) 100 unit/mL injection 25 Units by SubCUTAneous route two (2) times a day. insulin detemir U-100 (Levemir U-100 Insulin) 100 unit/mL injection 25 Units by SubCUTAneous route two (2) times a day. diclofenac (VOLTAREN) 1 % gel Apply 1 g to affected area four (4) times daily. Apply to knee    traMADoL (ULTRAM) 50 mg tablet Take 50 mg by mouth every eight (8) hours as needed for Pain.    famotidine (PEPCID) 40 mg tablet Take 40 mg by mouth daily. clopidogreL (PLAVIX) 75 mg tab TAKE 1 TABLET DAILY    metFORMIN (GLUCOPHAGE) 1,000 mg tablet TAKE 1 TABLET TWICE A DAY    simvastatin (ZOCOR) 20 mg tablet TAKE 1 TABLET NIGHTLY    ramipriL (ALTACE) 10 mg capsule TAKE 1 CAPSULE TWICE DAILY    amLODIPine (NORVASC) 5 mg tablet TAKE 1 TABLET DAILY    predniSONE (DELTASONE) 20 mg tablet  (Patient not taking: No sig reported)    Xeljanz XR 11 mg Tb24 Take 1 tablet by mouth daily as directed by physician    gabapentin (NEURONTIN) 300 mg capsule TAKE 2 CAPSULES NIGHTLY. MAXIMUM DAILY AMOUNT:      600MG. (Patient taking differently: Take 300 mg by mouth nightly.)    meclizine (ANTIVERT) 12.5 mg tablet TAKE 1 TABLET 3 TIMES A DAYAS NEEDED FOR DIZZINESS    OTHER Allergy plus patch from [employer]    Ferrous Fumarate 325 mg (106 mg iron) tab Take  by mouth.    cyanocobalamin (VITAMIN B12) 500 mcg tablet Take 1 Tab by mouth daily. cholecalciferol (VITAMIN D3) (2,000 UNITS /50 MCG) cap capsule Take 2,000 Units by mouth daily. insulin aspart U-100 (NOVOLOG) 100 unit/mL injection by SubCUTAneous route as needed. No current facility-administered medications for this visit. PAST MEDICAL HISTORY  Past Medical History:   Diagnosis Date    Chronic pain     Diabetes ([address]     Hypertension     Ovarian cyst     Removed Laproscopically       FAMILY HISTORY  Sister has rheumatoid arthritis, father had erosive osteoarthritis. SOCIAL HISTORY  She  reports that she has never smoked. She has never used smokeless tobacco. She reports that she does not drink alcohol and does not use drugs. Social History     Social History Narrative    Cosmetology school and course of Psychology   now cleans offices part-time, previously also CNA. DATA  Visit Vitals  BP (!) 193/46 (BP 1 Location: Left upper arm, BP Patient Position: Sitting, BP Cuff Size: Adult)   Pulse 84   Temp 98.8 °F (37.1 °C) (Temporal)   Resp 16   Wt 155 lb (70.3 kg)   SpO2 97%   BMI 25.79 kg/m²     General:  The patient is well developed, well nourished, alert, and in no apparent distress. Eyes: Sclera are anicteric. No conjunctival injection. HEENT:  Oropharynx is clear. No oral ulcers. Adequate salivary pooling. No cervical or supraclavicular lymphadenopathy. Lungs:  Clear to auscultation bilaterally, without wheeze or stridor. Normal respiratory effort. Cor:  Regular rate and rhythm. No murmur rub or gallop. Abdomen: Soft, non-tender, without hepatomegaly or masses. Extremities: No calf tenderness or edema. Warm and well perfused. Skin:  No significant abnormalities. No psoriaform lesions, no tophi. Neuro: +Normal gait. No LE muscle wasting, normal reflexes. Musculoskeletal:    A comprehensive musculoskeletal exam was performed for all joints of each upper and lower extremity and assessed for swelling, tenderness and range of motion. Results are documented as below:  No basilar neck pain currently, +cervical crepitus with rotation. Marked unchanged Cleo nodes globally right hand without tenderness.  Index PIP fused, still ~5deg flexion deformities in other PIPs. No deshawn synovitis. No trochanteric tenderness today  Bilateral knee crepitus with small Baker cysts. Interval resolved calf tenderness  No evidence of synovitis in the small joints of the hands, wrists, shoulders, elbows, hips, knees or ankles. Labs:  3/30/23: WBC 7.8, Hgb 9.1, Plt 401; ESR 32, K 5.4, Ca 9.4, Cr 2.19; LFTs WNL, CRP <0.29mg/dL  22: ESR 2, Cr 1.64, LFT WNL, WBC 7.1, HGB 9.7, Plt 319, CRP 1 mg/L  22: Lactic acid 1, WBC 10.1, HGB 9.2, Plt 334, Cr 1.89, LFT WNL, Mg 1.2, Phosphorus 4.1  22: WBC 6.1,  HGB 10.1, Plt 386, Cr 1.65, LFT WNL, Triglyceride 219, HGB A1c 7.3, Vit B12 724, Folate 5.5, Vit D 45.4, ESR 2, Cr 1.55, LFT WNL, WBC 6.1, HGB 10, Plt 368, CRP 1 mg/L  10/13/21: WBC 8.1, Hgb 10.1, Plt 313; Cr 1.47, Ca 10.4, LFTs WNL; , HDL 46, ; A1c 8.2  21: CBC WNL; ESR 21; Cr 1.2, LFTs WNL; TSH WNL; iPTH WNL; CRP 16mg/L  21: Cr 1.38, CMP WNL, CBC WNL (Hgb 10.5)  20: CBC WNL, Cr 1.1, Hep B cAb neg, sAb neg, sAg neg; Hep C Ab neg; QFN gold neg  20:     Imagin21 DXA:  This patient is osteoporotic using the World Health Organization criteria  10 year probability of major osteoporotic fracture: 22.3%  10 year probability of hip fracture: 8.7%    21 XR R hand: FINDINGS: Three views of the right hand demonstrate interphalangeal joints  demonstrate joint space narrowing. DIP joints degenerative joint space  narrowing. Carpal metacarpal joint space narrowing. Radiocarpal joint space  narrowing. . There are small erosions present. There is ulnar deviation at the  DIP joints. IMPRESSION  There is a combination of osteoarthritis and erosive arthritis. XR L hand: Moderate to severe osteoarthritis. .    17 XR Lspine (report only): Impression: Plain radiographs demonstrate 100% loss of disc height at L3-4, L4-5, and L5-S1 with resultant foraminal stenosis.        ASSESSMENT AND PLAN  Ms. Nae Gutierrez is a 65 y.o. female who presents for evaluation of erosive osteoarthritis of the hands, with a family history of psoriasis, with low disease activity. She is aware of the potential cardiovascular concerns but comfortable with this. Will try to transition from Prolia onto Fosamax, to avoid the rebound bone loss associated with abrupt discontinuation of the former. 1. Seronegative rheumatoid arthritis (HCC)  - alendronate (FOSAMAX) 70 mg tablet; Take 1 Tablet by mouth every seven (7) days. Dispense: 12 Tablet; Refill: 3  - DEXA BONE DENSITY STUDY AXIAL; Future  - C REACTIVE PROTEIN, QT; Future  - CBC WITH AUTOMATED DIFF; Future  - METABOLIC PANEL, COMPREHENSIVE; Future  - SED RATE (ESR); Future    2. Erosive osteoarthritis of both hands  - Arthritis compression gloves, Xeljanz    3. Age-related osteoporosis without current pathological fracture  - alendronate (FOSAMAX) 70 mg tablet; Take 1 Tablet by mouth every seven (7) days. Dispense: 12 Tablet; Refill: 3  - DEXA BONE DENSITY STUDY AXIAL; Future  - METABOLIC PANEL, COMPREHENSIVE; Future    4. Ongoing use of possibly toxic medication  - CBC WITH AUTOMATED DIFF; Future  - METABOLIC PANEL, COMPREHENSIVE; Future      Patient Instructions   Stop the Prolia injections. We are changing you over to Fosamax (alendronate) to safely get you off of this medication due to cost considerations and risk of hypocalcemia with gradually declining kidney function. Start Fosamax once a week. Take it on an empty stomach, and do not eat or lay down for an hour after taking it. Repeat labs in early July, after July 14th I won't be able to follow up on lab results. Call [phone] to schedule your updated bone density scan. Call the Rheumatology practices listed in your letter, to try to find an appointment with a new provider by September. If these practices are not able to fit you in, check with your insurance for alternate providers accepting your insurance locally.  We will be sending a letter whenever we get someone in who can continue your care in the future. Orders Placed This Encounter    DEXA BONE DENSITY STUDY AXIAL    C REACTIVE PROTEIN, QT    CBC WITH AUTOMATED DIFF    METABOLIC PANEL, COMPREHENSIVE    SED RATE (ESR)    alendronate (FOSAMAX) 70 mg tablet     Medications: I am having Rachel Ross maintain her insulin aspart U-100, cyanocobalamin, cholecalciferol, Ferrous Fumarate, OTHER, gabapentin, meclizine, Xeljanz XR, predniSONE, clopidogreL, metFORMIN, simvastatin, ramipriL, amLODIPine, traMADoL, famotidine, diclofenac, insulin detemir U-100, and insulin detemir U-100.     Face to face time: 26 minutes  Note preparation and records review day of service: 26 minutes  Total provider time day of service: 52 minutes    Marshall Khan MD    Adult Rheumatology   [facility] [address], [address] [place] [address]   Phone [phone]  Fax [phone]

## 2023-04-17 NOTE — PROGRESS NOTES
Chief Complaint   Patient presents with    Joint Pain     1. Have you been to the ER, urgent care clinic since your last visit? Hospitalized since your last visit? No    2. Have you seen or consulted any other health care providers outside of the [address] [place] [address] since your last visit? Include any pap smears or colon screening.  No

## 2023-04-17 NOTE — PATIENT INSTRUCTIONS
Stop the Prolia injections. We are changing you over to Fosamax (alendronate) to safely get you off of this medication due to cost considerations and risk of hypocalcemia with gradually declining kidney function. Start Fosamax once a week. Take it on an empty stomach, and do not eat or lay down for an hour after taking it. Repeat labs in early July, after July 14th I won't be able to follow up on lab results. Call [phone] to schedule your updated bone density scan. Call the Rheumatology practices listed in your letter, to try to find an appointment with a new provider by September. If these practices are not able to fit you in, check with your insurance for alternate providers accepting your insurance locally. We will be sending a letter whenever we get someone in who can continue your care in the future.

## 2023-04-23 DIAGNOSIS — M06.00 SERONEGATIVE RHEUMATOID ARTHRITIS (HCC): Primary | ICD-10-CM

## 2023-04-23 DIAGNOSIS — M06.00 SERONEGATIVE RHEUMATOID ARTHRITIS (HCC): ICD-10-CM

## 2023-04-23 DIAGNOSIS — Z79.899 ONGOING USE OF POSSIBLY TOXIC MEDICATION: ICD-10-CM

## 2023-04-23 DIAGNOSIS — M81.0 AGE-RELATED OSTEOPOROSIS WITHOUT CURRENT PATHOLOGICAL FRACTURE: Primary | ICD-10-CM

## 2023-04-24 DIAGNOSIS — Z79.899 ONGOING USE OF POSSIBLY TOXIC MEDICATION: ICD-10-CM

## 2023-04-24 DIAGNOSIS — M06.00 SERONEGATIVE RHEUMATOID ARTHRITIS (HCC): ICD-10-CM

## 2023-04-24 DIAGNOSIS — M06.00 SERONEGATIVE RHEUMATOID ARTHRITIS (HCC): Primary | ICD-10-CM

## 2023-04-24 DIAGNOSIS — M81.0 AGE-RELATED OSTEOPOROSIS WITHOUT CURRENT PATHOLOGICAL FRACTURE: Primary | ICD-10-CM

## 2023-04-25 DIAGNOSIS — M81.0 AGE-RELATED OSTEOPOROSIS WITHOUT CURRENT PATHOLOGICAL FRACTURE: Primary | ICD-10-CM

## 2023-04-25 DIAGNOSIS — M06.00 SERONEGATIVE RHEUMATOID ARTHRITIS (HCC): ICD-10-CM

## 2023-04-25 DIAGNOSIS — Z79.899 ONGOING USE OF POSSIBLY TOXIC MEDICATION: ICD-10-CM

## 2023-04-25 DIAGNOSIS — M06.00 SERONEGATIVE RHEUMATOID ARTHRITIS (HCC): Primary | ICD-10-CM

## 2023-04-28 DIAGNOSIS — Z79.899 ONGOING USE OF POSSIBLY TOXIC MEDICATION: ICD-10-CM

## 2023-04-28 DIAGNOSIS — M06.00 SERONEGATIVE RHEUMATOID ARTHRITIS (HCC): Primary | ICD-10-CM

## 2023-05-25 ENCOUNTER — OFFICE VISIT (OUTPATIENT)
Facility: CLINIC | Age: 78
End: 2023-05-25
Payer: MEDICARE

## 2023-05-25 VITALS
HEIGHT: 65 IN | SYSTOLIC BLOOD PRESSURE: 153 MMHG | OXYGEN SATURATION: 99 % | RESPIRATION RATE: 18 BRPM | TEMPERATURE: 97.8 F | WEIGHT: 154 LBS | BODY MASS INDEX: 25.66 KG/M2 | DIASTOLIC BLOOD PRESSURE: 56 MMHG | HEART RATE: 75 BPM

## 2023-05-25 DIAGNOSIS — I10 WHITE COAT SYNDROME WITH DIAGNOSIS OF HYPERTENSION: ICD-10-CM

## 2023-05-25 DIAGNOSIS — N18.2 TYPE 2 DIABETES MELLITUS WITH STAGE 2 CHRONIC KIDNEY DISEASE, WITH LONG-TERM CURRENT USE OF INSULIN (HCC): ICD-10-CM

## 2023-05-25 DIAGNOSIS — E11.22 TYPE 2 DIABETES MELLITUS WITH STAGE 2 CHRONIC KIDNEY DISEASE, WITH LONG-TERM CURRENT USE OF INSULIN (HCC): ICD-10-CM

## 2023-05-25 DIAGNOSIS — M79.2 NEURALGIA AND NEURITIS, UNSPECIFIED: ICD-10-CM

## 2023-05-25 DIAGNOSIS — G89.4 CHRONIC PAIN SYNDROME: ICD-10-CM

## 2023-05-25 DIAGNOSIS — G89.29 OTHER CHRONIC PAIN: ICD-10-CM

## 2023-05-25 DIAGNOSIS — K21.9 GASTROESOPHAGEAL REFLUX DISEASE WITHOUT ESOPHAGITIS: ICD-10-CM

## 2023-05-25 DIAGNOSIS — M06.00 SERONEGATIVE RHEUMATOID ARTHRITIS (HCC): ICD-10-CM

## 2023-05-25 DIAGNOSIS — Z79.4 TYPE 2 DIABETES MELLITUS WITH STAGE 2 CHRONIC KIDNEY DISEASE, WITH LONG-TERM CURRENT USE OF INSULIN (HCC): ICD-10-CM

## 2023-05-25 DIAGNOSIS — I10 ESSENTIAL HYPERTENSION: Primary | ICD-10-CM

## 2023-05-25 LAB
AMPHETAMINE, URINE, POC: NEGATIVE
BARBITURATES, URINE, POC: NEGATIVE
BENZODIAZEPINES, URINE, POC: NEGATIVE
COCAINE, URINE, POC: NEGATIVE
LOT EXP DATE, POC: NORMAL
Lab: NORMAL
MARIJUANA (THC), URINE, POC: NEGATIVE
MDMA/ECSTASY, URINE, POC: NEGATIVE
METHADONE, URINE, POC: NORMAL
METHAMPHETAMINE, URINE, POC: NEGATIVE
OPIATES 300, URINE, POC: NORMAL
OXYCODONE, URINE, POC: NEGATIVE
PHENCYCLIDINE, URINE, POC: NEGATIVE
TRICYCLIC ANTIDEPRESSANTS, URINE, POC: NEGATIVE

## 2023-05-25 PROCEDURE — 3078F DIAST BP <80 MM HG: CPT | Performed by: FAMILY MEDICINE

## 2023-05-25 PROCEDURE — 80305 DRUG TEST PRSMV DIR OPT OBS: CPT | Performed by: FAMILY MEDICINE

## 2023-05-25 PROCEDURE — 1123F ACP DISCUSS/DSCN MKR DOCD: CPT | Performed by: FAMILY MEDICINE

## 2023-05-25 PROCEDURE — 3077F SYST BP >= 140 MM HG: CPT | Performed by: FAMILY MEDICINE

## 2023-05-25 PROCEDURE — 99214 OFFICE O/P EST MOD 30 MIN: CPT | Performed by: FAMILY MEDICINE

## 2023-05-25 RX ORDER — TOFACITINIB 11 MG/1
TABLET, FILM COATED, EXTENDED RELEASE ORAL
COMMUNITY
Start: 2022-11-09

## 2023-05-25 RX ORDER — GABAPENTIN 300 MG/1
300 CAPSULE ORAL
Qty: 30 CAPSULE | Refills: 0
Start: 2023-05-25 | End: 2023-06-24

## 2023-05-25 RX ORDER — TRAMADOL HYDROCHLORIDE 50 MG/1
50 TABLET ORAL EVERY 8 HOURS PRN
Qty: 270 TABLET | Refills: 0 | Status: SHIPPED | OUTPATIENT
Start: 2023-05-25 | End: 2023-08-23

## 2023-05-25 RX ORDER — MECLIZINE HCL 12.5 MG/1
TABLET ORAL
COMMUNITY
Start: 2022-09-30

## 2023-05-25 RX ORDER — SIMVASTATIN 20 MG
1 TABLET ORAL NIGHTLY
COMMUNITY
Start: 2017-03-29

## 2023-05-25 RX ORDER — CLOPIDOGREL BISULFATE 75 MG/1
TABLET ORAL
COMMUNITY
Start: 2023-04-27

## 2023-05-25 RX ORDER — TRAMADOL HYDROCHLORIDE 50 MG/1
50 TABLET ORAL EVERY 8 HOURS PRN
COMMUNITY
Start: 2017-05-24 | End: 2023-05-25 | Stop reason: SDUPTHER

## 2023-05-25 RX ORDER — RAMIPRIL 10 MG/1
1 CAPSULE ORAL 2 TIMES DAILY
COMMUNITY
Start: 2017-03-29

## 2023-05-25 RX ORDER — AMLODIPINE BESYLATE 5 MG/1
TABLET ORAL
COMMUNITY
Start: 2023-04-27

## 2023-05-25 RX ORDER — FAMOTIDINE 20 MG/1
40 TABLET, FILM COATED ORAL
COMMUNITY

## 2023-05-25 RX ORDER — GABAPENTIN 300 MG/1
CAPSULE ORAL
COMMUNITY
Start: 2017-11-17 | End: 2023-05-25

## 2023-05-25 SDOH — ECONOMIC STABILITY: FOOD INSECURITY: WITHIN THE PAST 12 MONTHS, YOU WORRIED THAT YOUR FOOD WOULD RUN OUT BEFORE YOU GOT MONEY TO BUY MORE.: NEVER TRUE

## 2023-05-25 SDOH — ECONOMIC STABILITY: HOUSING INSECURITY
IN THE LAST 12 MONTHS, WAS THERE A TIME WHEN YOU DID NOT HAVE A STEADY PLACE TO SLEEP OR SLEPT IN A SHELTER (INCLUDING NOW)?: NO

## 2023-05-25 SDOH — ECONOMIC STABILITY: FOOD INSECURITY: WITHIN THE PAST 12 MONTHS, THE FOOD YOU BOUGHT JUST DIDN'T LAST AND YOU DIDN'T HAVE MONEY TO GET MORE.: NEVER TRUE

## 2023-05-25 SDOH — ECONOMIC STABILITY: INCOME INSECURITY: HOW HARD IS IT FOR YOU TO PAY FOR THE VERY BASICS LIKE FOOD, HOUSING, MEDICAL CARE, AND HEATING?: NOT HARD AT ALL

## 2023-05-25 ASSESSMENT — ENCOUNTER SYMPTOMS
BACK PAIN: 1
APNEA: 0
CHEST TIGHTNESS: 0

## 2023-05-25 ASSESSMENT — PATIENT HEALTH QUESTIONNAIRE - PHQ9
SUM OF ALL RESPONSES TO PHQ QUESTIONS 1-9: 0
2. FEELING DOWN, DEPRESSED OR HOPELESS: 0
SUM OF ALL RESPONSES TO PHQ9 QUESTIONS 1 & 2: 0
1. LITTLE INTEREST OR PLEASURE IN DOING THINGS: 0
SUM OF ALL RESPONSES TO PHQ QUESTIONS 1-9: 0

## 2023-05-25 NOTE — PROGRESS NOTES
[facility]    History of Present Illness:   Marvel Nugent is a 65 y.o. female with history of HTN, TIA, DM, Chronic Pain, Rheumatoid Arthritis  CC: Follow up  History provided by patient and Records    HPI:    Diabetes Follow up: Overall the patient feels well with good energy level. Current Medications: insulin detemir - 100 UNIT/ML. Elina is taking Detemir 25 untis in the morning and 15-20 units in the evening. Insulin dependence: Yes   Frequency of home glucose testing: Three times daily   Blood Sugar range at home: Morning 100 range, and in the evening in 150's    Last eye exam: In past 12 months. Last foot exam: This year. Polyuria, polyphagia or polydipsia: No   Retinopathy: No   Neuropathy SX: No   Low blood sugar symptoms: No   Dietary compliance: compliant most of the time   Medication compliance: compliant most of the time   On ASA: Yes   Tobacco Use: No   Depression: No     Wt Readings from Last 3 Encounters:   05/25/23 154 lb (69.9 kg)   04/17/23 155 lb (70.3 kg)   12/30/22 159 lb 12.8 oz (72.5 kg)      No results found for: HBA1C, YHU3FBVD   No results found for: MCACR, MCA2, MCAU, MCAU2    No results found for: HEIDY, CREAPOC, CREA      Hypertension Follow up: The patient reports:  taking medications as instructed, no medication side effects noted, no TIA's, no chest pain on exertion, no dyspnea on exertion, no swelling of ankles, no orthostatic dizziness or lightheadedness, no orthopnea or paroxysmal nocturnal dyspnea. BP Readings from Last 3 Encounters:   05/25/23 (!) 153/56   04/17/23 (!) 193/46   12/30/22 (!) 186/70      Rheumatoid Arthritis: See's Dr Navdeep Andre for management, Uzma Arceo and is helping significantly. Chronic Pain: Taking Gabapentin and Tramadol. 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 Gabapentin has helped the fingers significantly. Noting persistent leg pains bilaterally with any walking/stairs. Has not done as much exercise.

## 2023-05-25 NOTE — PROGRESS NOTES
1. \"Have you been to the ER, urgent care clinic since your last visit? Hospitalized since your last visit? \" No    2. \"Have you seen or consulted any other health care providers outside of the [address] [place] [address] since your last visit? \" yes     3. For patients aged 39-70: Has the patient had a colonoscopy / FIT/ Cologuard? Appt scheduled for  5/30/23      If the patient is female:    4. For patients aged 41-77: Has the patient had a mammogram within the past 2 years? na      5.  For patients aged 21-65: Has the patient had a pap smear? na    Health Maintenance Due   Topic Date Due    DTaP/Tdap/Td vaccine (1 - Tdap) Never done    COVID-19 Vaccine (3 - Booster for Jaleel Scales series) 07/14/2021    Annual Wellness Visit (AWV)  02/25/2023

## 2023-05-26 LAB
ALBUMIN SERPL-MCNC: 3.7 G/DL (ref 3.5–5)
ALBUMIN/GLOB SERPL: 1.2 (ref 1.1–2.2)
ALP SERPL-CCNC: 58 U/L (ref 45–117)
ALT SERPL-CCNC: 18 U/L (ref 12–78)
ANION GAP SERPL CALC-SCNC: 3 MMOL/L (ref 5–15)
AST SERPL-CCNC: 14 U/L (ref 15–37)
BILIRUB SERPL-MCNC: 0.2 MG/DL (ref 0.2–1)
BUN SERPL-MCNC: 18 MG/DL (ref 6–20)
BUN/CREAT SERPL: 9 (ref 12–20)
CALCIUM SERPL-MCNC: 9.2 MG/DL (ref 8.5–10.1)
CHLORIDE SERPL-SCNC: 107 MMOL/L (ref 97–108)
CHOLEST SERPL-MCNC: 156 MG/DL
CO2 SERPL-SCNC: 26 MMOL/L (ref 21–32)
CREAT SERPL-MCNC: 1.91 MG/DL (ref 0.55–1.02)
ERYTHROCYTE [DISTWIDTH] IN BLOOD BY AUTOMATED COUNT: 16.1 % (ref 11.5–14.5)
EST. AVERAGE GLUCOSE BLD GHB EST-MCNC: 137 MG/DL
GLOBULIN SER CALC-MCNC: 3.1 G/DL (ref 2–4)
GLUCOSE SERPL-MCNC: 94 MG/DL (ref 65–100)
HBA1C MFR BLD: 6.4 % (ref 4–5.6)
HCT VFR BLD AUTO: 27.5 % (ref 35–47)
HDLC SERPL-MCNC: 58 MG/DL
HDLC SERPL: 2.7 (ref 0–5)
HGB BLD-MCNC: 8.4 G/DL (ref 11.5–16)
LDLC SERPL CALC-MCNC: 75.2 MG/DL (ref 0–100)
MCH RBC QN AUTO: 28.4 PG (ref 26–34)
MCHC RBC AUTO-ENTMCNC: 30.5 G/DL (ref 30–36.5)
MCV RBC AUTO: 92.9 FL (ref 80–99)
NRBC # BLD: 0.02 K/UL (ref 0–0.01)
NRBC BLD-RTO: 0.3 PER 100 WBC
PLATELET # BLD AUTO: 406 K/UL (ref 150–400)
PMV BLD AUTO: 10.3 FL (ref 8.9–12.9)
POTASSIUM SERPL-SCNC: 5.3 MMOL/L (ref 3.5–5.1)
PROT SERPL-MCNC: 6.8 G/DL (ref 6.4–8.2)
RBC # BLD AUTO: 2.96 M/UL (ref 3.8–5.2)
SODIUM SERPL-SCNC: 136 MMOL/L (ref 136–145)
TRIGL SERPL-MCNC: 114 MG/DL
VLDLC SERPL CALC-MCNC: 22.8 MG/DL
WBC # BLD AUTO: 7.6 K/UL (ref 3.6–11)

## 2023-05-26 RX ORDER — AMLODIPINE BESYLATE 5 MG/1
1 TABLET ORAL DAILY
COMMUNITY
Start: 2023-04-27 | End: 2023-07-19

## 2023-05-26 RX ORDER — FAMOTIDINE 40 MG/1
40 TABLET, FILM COATED ORAL DAILY
COMMUNITY
Start: 2023-04-27

## 2023-05-26 RX ORDER — CLOPIDOGREL BISULFATE 75 MG/1
1 TABLET ORAL DAILY
COMMUNITY
Start: 2023-04-27 | End: 2023-07-19

## 2023-05-26 RX ORDER — INSULIN ASPART 100 [IU]/ML
INJECTION, SOLUTION INTRAVENOUS; SUBCUTANEOUS PRN
COMMUNITY

## 2023-05-26 RX ORDER — ALENDRONATE SODIUM 70 MG/1
70 TABLET ORAL
COMMUNITY
Start: 2023-04-17 | End: 2023-07-19

## 2023-05-26 RX ORDER — PREDNISONE 20 MG/1
TABLET ORAL
COMMUNITY
Start: 2022-10-22 | End: 2023-07-19

## 2023-05-31 ENCOUNTER — TELEPHONE (OUTPATIENT)
Facility: CLINIC | Age: 78
End: 2023-05-31

## 2023-05-31 DIAGNOSIS — M81.6 LOCALIZED OSTEOPOROSIS, UNSPECIFIED PATHOLOGICAL FRACTURE PRESENCE: ICD-10-CM

## 2023-05-31 DIAGNOSIS — G89.4 CHRONIC PAIN SYNDROME: Primary | ICD-10-CM

## 2023-05-31 DIAGNOSIS — M06.00 SERONEGATIVE RHEUMATOID ARTHRITIS (HCC): ICD-10-CM

## 2023-06-01 RX ORDER — TRAMADOL HYDROCHLORIDE 50 MG/1
50 TABLET ORAL EVERY 8 HOURS PRN
Qty: 270 TABLET | Refills: 0 | Status: SHIPPED | OUTPATIENT
Start: 2023-06-01 | End: 2023-08-30

## 2023-06-01 NOTE — TELEPHONE ENCOUNTER
Discussed situation, will reorder for Tramadol, issue with fill history. Patient has been taking appropriately but fill history affected to due fill pharmacy and insurance issue. Paitent will betaking tramadol 7 day fill and then will restart on chronic fills.     MD CARLIE Chavez & FABIEN BROWN [facility]  06/01/23

## 2023-06-07 ENCOUNTER — OFFICE VISIT (OUTPATIENT)
Dept: FAMILY MEDICINE | Facility: CLINIC | Age: 78
End: 2023-06-07
Payer: MEDICARE

## 2023-06-07 VITALS
RESPIRATION RATE: 18 BRPM | SYSTOLIC BLOOD PRESSURE: 152 MMHG | OXYGEN SATURATION: 97 % | DIASTOLIC BLOOD PRESSURE: 58 MMHG | HEART RATE: 74 BPM | TEMPERATURE: 97.6 F | HEIGHT: 65 IN | BODY MASS INDEX: 24.96 KG/M2 | WEIGHT: 149.8 LBS

## 2023-06-07 DIAGNOSIS — I10 PRIMARY HYPERTENSION: ICD-10-CM

## 2023-06-07 DIAGNOSIS — T78.3XXA ANGIOEDEMA, INITIAL ENCOUNTER: ICD-10-CM

## 2023-06-07 PROCEDURE — 99214 OFFICE O/P EST MOD 30 MIN: CPT | Performed by: STUDENT IN AN ORGANIZED HEALTH CARE EDUCATION/TRAINING PROGRAM

## 2023-06-07 PROCEDURE — G0463 HOSPITAL OUTPT CLINIC VISIT: HCPCS

## 2023-06-07 PROCEDURE — 99203 OFFICE O/P NEW LOW 30 MIN: CPT | Performed by: STUDENT IN AN ORGANIZED HEALTH CARE EDUCATION/TRAINING PROGRAM

## 2023-06-07 PROCEDURE — 96372 THER/PROPH/DIAG INJ SC/IM: CPT

## 2023-06-07 PROCEDURE — 6360000200 HC RX 636 W HCPCS (ALT 250 FOR IP): Performed by: STUDENT IN AN ORGANIZED HEALTH CARE EDUCATION/TRAINING PROGRAM

## 2023-06-07 RX ORDER — INSULIN DETEMIR 100 [IU]/ML
INJECTION, SOLUTION SUBCUTANEOUS
COMMUNITY
Start: 2023-05-08

## 2023-06-07 RX ORDER — RAMIPRIL 10 MG/1
CAPSULE ORAL
COMMUNITY
Start: 2023-04-27

## 2023-06-07 RX ORDER — TRAMADOL HYDROCHLORIDE 50 MG/1
50 TABLET ORAL 3 TIMES DAILY PRN
COMMUNITY
Start: 2023-06-02

## 2023-06-07 RX ORDER — TOFACITINIB 11 MG/1
TABLET, FILM COATED, EXTENDED RELEASE ORAL
COMMUNITY
Start: 2022-11-09

## 2023-06-07 RX ORDER — TRIAMCINOLONE ACETONIDE 40 MG/ML
40 INJECTION, SUSPENSION INTRA-ARTICULAR; INTRAMUSCULAR ONCE
Status: COMPLETED | OUTPATIENT
Start: 2023-06-07 | End: 2023-06-07

## 2023-06-07 RX ORDER — POLYETHYLENE GLYCOL-3350 AND ELECTROLYTES 236; 6.74; 5.86; 2.97; 22.74 G/274.31G; G/274.31G; G/274.31G; G/274.31G; G/274.31G
POWDER, FOR SOLUTION ORAL SEE ADMIN INSTRUCTIONS
COMMUNITY
Start: 2023-05-24

## 2023-06-07 RX ORDER — AMLODIPINE BESYLATE 5 MG/1
5 TABLET ORAL DAILY
COMMUNITY
Start: 2023-04-27

## 2023-06-07 RX ORDER — GABAPENTIN 300 MG/1
300 CAPSULE ORAL
COMMUNITY
Start: 2022-09-30 | End: 2023-06-24

## 2023-06-07 RX ORDER — DICLOFENAC SODIUM 10 MG/G
1 GEL TOPICAL
COMMUNITY
Start: 2022-12-30

## 2023-06-07 RX ORDER — MELOXICAM 15 MG/1
15 TABLET ORAL DAILY
Qty: 30 TABLET | Refills: 11 | COMMUNITY
Start: 2022-11-02 | End: 2023-11-02

## 2023-06-07 RX ORDER — PREDNISONE 20 MG/1
TABLET ORAL
COMMUNITY
Start: 2022-10-22

## 2023-06-07 RX ORDER — MECLIZINE HCL 12.5 MG 12.5 MG/1
12.5 TABLET ORAL 3 TIMES DAILY
COMMUNITY
Start: 2023-04-27

## 2023-06-07 RX ORDER — FAMOTIDINE 40 MG/1
1 TABLET, FILM COATED ORAL DAILY
COMMUNITY
Start: 2023-04-27

## 2023-06-07 RX ORDER — CLOPIDOGREL BISULFATE 75 MG/1
75 TABLET ORAL DAILY
COMMUNITY
Start: 2023-04-27

## 2023-06-07 RX ORDER — FERROUS SULFATE 325(65) MG
325 TABLET ORAL
COMMUNITY

## 2023-06-07 RX ORDER — SIMVASTATIN 20 MG/1
20 TABLET, FILM COATED ORAL NIGHTLY
COMMUNITY
Start: 2023-04-27

## 2023-06-07 RX ORDER — INSULIN ASPART 100 [IU]/ML
INJECTION, SOLUTION INTRAVENOUS; SUBCUTANEOUS
COMMUNITY

## 2023-06-07 RX ORDER — ALENDRONATE SODIUM 70 MG/1
1 TABLET ORAL
COMMUNITY
Start: 2023-04-17

## 2023-06-07 RX ORDER — PNV NO.95/FERROUS FUM/FOLIC AC 28MG-0.8MG
1 TABLET ORAL DAILY
COMMUNITY

## 2023-06-07 RX ORDER — METFORMIN HYDROCHLORIDE 1000 MG/1
1000 TABLET ORAL 2 TIMES DAILY
COMMUNITY
Start: 2023-04-27

## 2023-06-07 RX ADMIN — TRIAMCINOLONE ACETONIDE 40 MG: 40 INJECTION, SUSPENSION INTRA-ARTICULAR; INTRAMUSCULAR at 11:35

## 2023-06-07 ASSESSMENT — PAIN SCALES - GENERAL: PAINLEVEL: 0-NO PAIN

## 2023-06-07 NOTE — ASSESSMENT & PLAN NOTE
Patient is her blood pressure at home is 1 10-1 20 systolic over 70 diastolic  Since we are discontinuing her ACE inhibitor encouraged her to check her blood pressure daily, if it starts creeping up into the 140s to 150s systolic range she is to double her dose of amlodipine to 5 mg to 10 mg  If he gets significantly elevated into the 180s to 190s patient is to report to ER/clinic

## 2023-06-07 NOTE — ASSESSMENT & PLAN NOTE
Highly suspicious of angioedema, third episode  No respiratory distress, no breathing difficulty, no itchy or dry throat  We will do IM Kenalog, patient to go home and take Benadryl as prescribed, double up on her normal steroid dose  If symptoms worsen/progress throughout the day, feels like her airway is closing in her its getting more difficult to breathe issues to report to the ER immediately

## 2023-06-07 NOTE — PROGRESS NOTES
"Subjective:     Patient ID: Danna Hinds is a 77 y.o. female here visiting from out of town with sister.  Came in due to swollen lips that are getting worse.  Patient states she has had this 2 times in the past, once in December, and then August of last year requiring hospitalizations.  Patient is on an ACE inhibitor, states she has been on it for years, denies any shortness of breath, does not feel like her throat is itchy or swollen, able to eat without any issue.  Was just concerned because her face was normal around 2:00 last night when she woke up her lips were swollen and it seems to be spreading across her face.    ROS:  Review of Systems   All other systems reviewed and are negative.         /58 (BP Location: Right arm, Patient Position: Sitting, Cuff Size: Regular Adult)   Pulse 74   Temp 36.4 °C (97.6 °F) (Temporal)   Resp 18   Ht 1.651 m (5' 5\")   Wt 67.9 kg (149 lb 12.8 oz)   SpO2 97%   BMI 24.93 kg/m²   GENERAL APPEARANCE: NAD.  Alert and interactive.  HEENT: Atraumatic, normocephalic.  PERRL.  EOMI. No conjunctival erythema. External ear normal. No nasal congestion or rhinorrhea.  Upper and lower lips swollen, no erythema, left cheek appears more swollen than the right  NECK: Supple.  PULM: Normal respiratory effort.    DERM: Warm and dry.   MSK: No obvious edema, erythema or deformity.  PSYCH: Cooperative, normal affect.    NEURO: No focal deficits.      LABS/Imaging:      A/P:  Angioedema  Highly suspicious of angioedema, third episode  No respiratory distress, no breathing difficulty, no itchy or dry throat  We will do IM Kenalog, patient to go home and take Benadryl as prescribed, double up on her normal steroid dose  If symptoms worsen/progress throughout the day, feels like her airway is closing in her its getting more difficult to breathe issues to report to the ER immediately    Primary hypertension  Patient is her blood pressure at home is 1 10-1 20 systolic over 70 " diastolic  Since we are discontinuing her ACE inhibitor encouraged her to check her blood pressure daily, if it starts creeping up into the 140s to 150s systolic range she is to double her dose of amlodipine to 5 mg to 10 mg  If he gets significantly elevated into the 180s to 190s patient is to report to ER/clinic       30+ minutes spent in patient care, documentation, answering questions or concerns

## 2023-07-10 ENCOUNTER — HOSPITAL ENCOUNTER (OUTPATIENT)
Facility: HOSPITAL | Age: 78
Discharge: HOME | End: 2023-07-13
Payer: MEDICARE

## 2023-07-10 DIAGNOSIS — M06.00 SERONEGATIVE RHEUMATOID ARTHRITIS (HCC): ICD-10-CM

## 2023-07-10 DIAGNOSIS — M81.0 AGE-RELATED OSTEOPOROSIS WITHOUT CURRENT PATHOLOGICAL FRACTURE: ICD-10-CM

## 2023-07-10 PROCEDURE — 77080 DXA BONE DENSITY AXIAL: CPT

## 2023-07-14 ENCOUNTER — TELEPHONE (OUTPATIENT)
Facility: CLINIC | Age: 78
End: 2023-07-14

## 2023-07-14 RX ORDER — METOPROLOL SUCCINATE 50 MG/1
50 TABLET, EXTENDED RELEASE ORAL DAILY
Qty: 90 TABLET | Refills: 1 | Status: SHIPPED | OUTPATIENT
Start: 2023-07-14

## 2023-07-14 NOTE — TELEPHONE ENCOUNTER
Pt's dtr states the pt was taken off of Ramipril due to facial swelling, and started on Metoprolol 50 mg once daily by a Dr in [place] while she was on vacation. Pt is back home now and has an appt on 7-19 for follow up. Pt will run out of medication on Sunday 7-16. Can Dr please send in a refill of the Metoprolol 50 mg to [facility] ([place], [place]). Please advise.

## 2023-07-19 ENCOUNTER — OFFICE VISIT (OUTPATIENT)
Facility: CLINIC | Age: 78
End: 2023-07-19
Payer: MEDICARE

## 2023-07-19 VITALS
OXYGEN SATURATION: 95 % | BODY MASS INDEX: 26.99 KG/M2 | RESPIRATION RATE: 18 BRPM | SYSTOLIC BLOOD PRESSURE: 154 MMHG | DIASTOLIC BLOOD PRESSURE: 50 MMHG | TEMPERATURE: 97.2 F | HEART RATE: 56 BPM | WEIGHT: 162 LBS | HEIGHT: 65 IN

## 2023-07-19 DIAGNOSIS — Z79.4 TYPE 2 DIABETES MELLITUS WITH STAGE 2 CHRONIC KIDNEY DISEASE, WITH LONG-TERM CURRENT USE OF INSULIN (HCC): ICD-10-CM

## 2023-07-19 DIAGNOSIS — N18.2 TYPE 2 DIABETES MELLITUS WITH STAGE 2 CHRONIC KIDNEY DISEASE, WITH LONG-TERM CURRENT USE OF INSULIN (HCC): ICD-10-CM

## 2023-07-19 DIAGNOSIS — Z79.4 TYPE 2 DIABETES MELLITUS WITH DIABETIC NEUROPATHY, WITH LONG-TERM CURRENT USE OF INSULIN (HCC): ICD-10-CM

## 2023-07-19 DIAGNOSIS — E11.40 TYPE 2 DIABETES MELLITUS WITH DIABETIC NEUROPATHY, WITH LONG-TERM CURRENT USE OF INSULIN (HCC): ICD-10-CM

## 2023-07-19 DIAGNOSIS — E11.22 TYPE 2 DIABETES MELLITUS WITH STAGE 2 CHRONIC KIDNEY DISEASE, WITH LONG-TERM CURRENT USE OF INSULIN (HCC): ICD-10-CM

## 2023-07-19 DIAGNOSIS — M81.0 OSTEOPOROSIS WITHOUT CURRENT PATHOLOGICAL FRACTURE, UNSPECIFIED OSTEOPOROSIS TYPE: ICD-10-CM

## 2023-07-19 DIAGNOSIS — G89.29 OTHER CHRONIC PAIN: ICD-10-CM

## 2023-07-19 DIAGNOSIS — N18.30 STAGE 3 CHRONIC KIDNEY DISEASE, UNSPECIFIED WHETHER STAGE 3A OR 3B CKD (HCC): ICD-10-CM

## 2023-07-19 DIAGNOSIS — I10 ESSENTIAL HYPERTENSION: Primary | ICD-10-CM

## 2023-07-19 DIAGNOSIS — I10 WHITE COAT SYNDROME WITH DIAGNOSIS OF HYPERTENSION: ICD-10-CM

## 2023-07-19 PROCEDURE — 99214 OFFICE O/P EST MOD 30 MIN: CPT | Performed by: FAMILY MEDICINE

## 2023-07-19 PROCEDURE — 3078F DIAST BP <80 MM HG: CPT | Performed by: FAMILY MEDICINE

## 2023-07-19 PROCEDURE — 1123F ACP DISCUSS/DSCN MKR DOCD: CPT | Performed by: FAMILY MEDICINE

## 2023-07-19 PROCEDURE — 3044F HG A1C LEVEL LT 7.0%: CPT | Performed by: FAMILY MEDICINE

## 2023-07-19 PROCEDURE — 3077F SYST BP >= 140 MM HG: CPT | Performed by: FAMILY MEDICINE

## 2023-07-19 RX ORDER — FAMOTIDINE 20 MG/1
40 TABLET, FILM COATED ORAL DAILY
Qty: 60 TABLET | Refills: 1
Start: 2023-07-19

## 2023-07-19 RX ORDER — SIMVASTATIN 20 MG
20 TABLET ORAL DAILY
Qty: 90 TABLET | Refills: 1
Start: 2023-07-19

## 2023-07-19 RX ORDER — MECLIZINE HCL 12.5 MG/1
12.5 TABLET ORAL
Qty: 90 TABLET | Refills: 1
Start: 2023-07-19

## 2023-07-19 RX ORDER — CLOPIDOGREL BISULFATE 75 MG/1
75 TABLET ORAL DAILY
Qty: 90 TABLET | Refills: 1
Start: 2023-07-19

## 2023-07-19 ASSESSMENT — ENCOUNTER SYMPTOMS
BACK PAIN: 0
APNEA: 0
CHEST TIGHTNESS: 0

## 2023-07-19 NOTE — PROGRESS NOTES
heard.    No friction rub. No gallop. Pulmonary:      Effort: Pulmonary effort is normal.      Breath sounds: Normal breath sounds. Abdominal:      General: Abdomen is flat. Bowel sounds are normal.      Palpations: Abdomen is soft. Musculoskeletal:         General: Normal range of motion. Cervical back: Normal range of motion and neck supple. Skin:     General: Skin is warm and dry. Neurological:      General: No focal deficit present. Mental Status: She is alert and oriented to person, place, and time. Psychiatric:         Mood and Affect: Mood normal.         Behavior: Behavior normal.        Pertinent Labs/Studies:      Assessment and orders:       ICD-10-CM    1. Essential hypertension  I10 Comprehensive Metabolic Panel     CBC      2. Type 2 diabetes mellitus with stage 2 chronic kidney disease, with long-term current use of insulin ([facility])  E11.22 insulin detemir (LEVEMIR) 100 UNIT/ML injection vial    N18.2 Hemoglobin A1C    Z79.4 Lipid Panel      3. Type 2 diabetes mellitus with diabetic neuropathy, with long-term current use of insulin ([facility])  E11.40 Hemoglobin A1C    Z79.4 Lipid Panel      4. Stage 3 chronic kidney disease, unspecified whether stage 3a or 3b CKD ([facility])  N18.30       5. Other chronic pain  G89.29       6. Osteoporosis without current pathological fracture, unspecified osteoporosis type  M81.0       7. White coat syndrome with diagnosis of hypertension  I10           1. Essential hypertension  - Comprehensive Metabolic Panel; Future  - CBC; Future    2. Type 2 diabetes mellitus with stage 2 chronic kidney disease, with long-term current use of insulin ([facility])  - insulin detemir (LEVEMIR) 100 UNIT/ML injection vial; Take 25 units in the morning and 15 units at night. Dx. E11.9  Dispense: 36 mL; Refill: 1  - Hemoglobin A1C; Future  - Lipid Panel; Future    3.  Type 2 diabetes mellitus with diabetic neuropathy, with long-term current use of insulin ([facility])  - Hemoglobin A1C;

## 2023-08-22 ENCOUNTER — PATIENT MESSAGE (OUTPATIENT)
Facility: CLINIC | Age: 78
End: 2023-08-22

## 2023-08-22 RX ORDER — FAMOTIDINE 20 MG/1
40 TABLET, FILM COATED ORAL DAILY
Qty: 180 TABLET | Refills: 1 | Status: SHIPPED | OUTPATIENT
Start: 2023-08-22

## 2023-08-22 RX ORDER — PANTOPRAZOLE SODIUM 40 MG/1
40 TABLET, DELAYED RELEASE ORAL
Qty: 90 TABLET | Refills: 1 | Status: SHIPPED | OUTPATIENT
Start: 2023-08-22

## 2023-08-22 NOTE — TELEPHONE ENCOUNTER
From: [facility]  To: Dr. Dayan Hinton  Sent: 8/22/2023 8:45 AM EDT  Subject: Heartburn    Good morning Dr Alysha Chen! Mom has been having terrible heart burn since she hasn't been taking that pill we talked about last time. I don't remember the name of it but can you please call her in a 90 day script to the [facility]? We have appts with you on Friday so see you soon!  Have a great day!!

## 2023-08-25 ENCOUNTER — OFFICE VISIT (OUTPATIENT)
Facility: CLINIC | Age: 78
End: 2023-08-25
Payer: MEDICARE

## 2023-08-25 VITALS
OXYGEN SATURATION: 100 % | WEIGHT: 153.2 LBS | RESPIRATION RATE: 18 BRPM | HEART RATE: 59 BPM | BODY MASS INDEX: 25.52 KG/M2 | SYSTOLIC BLOOD PRESSURE: 178 MMHG | TEMPERATURE: 97.1 F | DIASTOLIC BLOOD PRESSURE: 62 MMHG | HEIGHT: 65 IN

## 2023-08-25 DIAGNOSIS — Z79.4 TYPE 2 DIABETES MELLITUS WITH DIABETIC NEUROPATHY, WITH LONG-TERM CURRENT USE OF INSULIN (HCC): ICD-10-CM

## 2023-08-25 DIAGNOSIS — Z79.4 TYPE 2 DIABETES MELLITUS WITH STAGE 2 CHRONIC KIDNEY DISEASE, WITH LONG-TERM CURRENT USE OF INSULIN (HCC): ICD-10-CM

## 2023-08-25 DIAGNOSIS — M81.6 LOCALIZED OSTEOPOROSIS, UNSPECIFIED PATHOLOGICAL FRACTURE PRESENCE: ICD-10-CM

## 2023-08-25 DIAGNOSIS — E11.40 TYPE 2 DIABETES MELLITUS WITH DIABETIC NEUROPATHY, WITH LONG-TERM CURRENT USE OF INSULIN (HCC): ICD-10-CM

## 2023-08-25 DIAGNOSIS — G89.4 CHRONIC PAIN SYNDROME: ICD-10-CM

## 2023-08-25 DIAGNOSIS — M79.2 NEURALGIA AND NEURITIS, UNSPECIFIED: ICD-10-CM

## 2023-08-25 DIAGNOSIS — E11.22 TYPE 2 DIABETES MELLITUS WITH STAGE 2 CHRONIC KIDNEY DISEASE, WITH LONG-TERM CURRENT USE OF INSULIN (HCC): ICD-10-CM

## 2023-08-25 DIAGNOSIS — Z86.73 HX OF TRANSIENT ISCHEMIC ATTACK (TIA): ICD-10-CM

## 2023-08-25 DIAGNOSIS — N18.2 TYPE 2 DIABETES MELLITUS WITH STAGE 2 CHRONIC KIDNEY DISEASE, WITH LONG-TERM CURRENT USE OF INSULIN (HCC): ICD-10-CM

## 2023-08-25 DIAGNOSIS — M06.00 SERONEGATIVE RHEUMATOID ARTHRITIS (HCC): ICD-10-CM

## 2023-08-25 DIAGNOSIS — I10 ESSENTIAL HYPERTENSION: Primary | ICD-10-CM

## 2023-08-25 PROCEDURE — 3077F SYST BP >= 140 MM HG: CPT | Performed by: FAMILY MEDICINE

## 2023-08-25 PROCEDURE — 3044F HG A1C LEVEL LT 7.0%: CPT | Performed by: FAMILY MEDICINE

## 2023-08-25 PROCEDURE — 3078F DIAST BP <80 MM HG: CPT | Performed by: FAMILY MEDICINE

## 2023-08-25 PROCEDURE — 99214 OFFICE O/P EST MOD 30 MIN: CPT | Performed by: FAMILY MEDICINE

## 2023-08-25 PROCEDURE — 1123F ACP DISCUSS/DSCN MKR DOCD: CPT | Performed by: FAMILY MEDICINE

## 2023-08-25 RX ORDER — METOPROLOL SUCCINATE 50 MG/1
50 TABLET, EXTENDED RELEASE ORAL DAILY
Qty: 90 TABLET | Refills: 1 | Status: SHIPPED | OUTPATIENT
Start: 2023-08-25

## 2023-08-25 RX ORDER — GABAPENTIN 300 MG/1
300 CAPSULE ORAL
Qty: 90 CAPSULE | Refills: 0 | Status: SHIPPED | OUTPATIENT
Start: 2023-08-25 | End: 2023-11-23

## 2023-08-25 RX ORDER — TRAMADOL HYDROCHLORIDE 50 MG/1
50 TABLET ORAL EVERY 8 HOURS PRN
Qty: 270 TABLET | Refills: 0 | Status: SHIPPED | OUTPATIENT
Start: 2023-08-25 | End: 2023-11-23

## 2023-08-25 RX ORDER — TOFACITINIB 11 MG/1
TABLET, FILM COATED, EXTENDED RELEASE ORAL
Qty: 30 TABLET | Status: CANCELLED | OUTPATIENT
Start: 2023-08-25

## 2023-08-25 RX ORDER — AMLODIPINE BESYLATE 5 MG/1
5 TABLET ORAL DAILY
Qty: 90 TABLET | Refills: 1 | Status: SHIPPED | OUTPATIENT
Start: 2023-08-25

## 2023-08-25 RX ORDER — MECLIZINE HCL 12.5 MG/1
12.5 TABLET ORAL
Qty: 90 TABLET | Refills: 1 | Status: SHIPPED | OUTPATIENT
Start: 2023-08-25

## 2023-08-25 RX ORDER — CLOPIDOGREL BISULFATE 75 MG/1
75 TABLET ORAL DAILY
Qty: 90 TABLET | Refills: 1 | Status: SHIPPED | OUTPATIENT
Start: 2023-08-25

## 2023-08-25 RX ORDER — SIMVASTATIN 20 MG
20 TABLET ORAL DAILY
Qty: 90 TABLET | Refills: 1 | Status: SHIPPED | OUTPATIENT
Start: 2023-08-25

## 2023-08-25 ASSESSMENT — ANXIETY QUESTIONNAIRES
1. FEELING NERVOUS, ANXIOUS, OR ON EDGE: 0
7. FEELING AFRAID AS IF SOMETHING AWFUL MIGHT HAPPEN: 0
5. BEING SO RESTLESS THAT IT IS HARD TO SIT STILL: 0
4. TROUBLE RELAXING: 0
GAD7 TOTAL SCORE: 0
IF YOU CHECKED OFF ANY PROBLEMS ON THIS QUESTIONNAIRE, HOW DIFFICULT HAVE THESE PROBLEMS MADE IT FOR YOU TO DO YOUR WORK, TAKE CARE OF THINGS AT HOME, OR GET ALONG WITH OTHER PEOPLE: NOT DIFFICULT AT ALL
2. NOT BEING ABLE TO STOP OR CONTROL WORRYING: 0
3. WORRYING TOO MUCH ABOUT DIFFERENT THINGS: 0
6. BECOMING EASILY ANNOYED OR IRRITABLE: 0

## 2023-08-25 ASSESSMENT — ENCOUNTER SYMPTOMS: CHEST TIGHTNESS: 0

## 2023-08-25 ASSESSMENT — PATIENT HEALTH QUESTIONNAIRE - PHQ9
SUM OF ALL RESPONSES TO PHQ9 QUESTIONS 1 & 2: 0
SUM OF ALL RESPONSES TO PHQ QUESTIONS 1-9: 0
SUM OF ALL RESPONSES TO PHQ QUESTIONS 1-9: 0
2. FEELING DOWN, DEPRESSED OR HOPELESS: 0
SUM OF ALL RESPONSES TO PHQ QUESTIONS 1-9: 0
1. LITTLE INTEREST OR PLEASURE IN DOING THINGS: 0
SUM OF ALL RESPONSES TO PHQ QUESTIONS 1-9: 0

## 2023-08-25 NOTE — PROGRESS NOTES
1. \"Have you been to the ER, urgent care clinic since your last visit? Hospitalized since your last visit? \" no    2. \"Have you seen or consulted any other health care providers outside of the [address] [place] [address] since your last visit? \" no       Health Maintenance Due   Topic Date Due    DTaP/Tdap/Td vaccine (1 - Tdap) Never done    COVID-19 Vaccine (3 - Booster for Moderna series) 07/14/2021    Annual Wellness Visit (AWV)  02/25/2023    Flu vaccine (1) 08/01/2023

## 2023-08-25 NOTE — PROGRESS NOTES
[facility]    History of Present Illness:   Ricky Sanchez is a 77 y.o. female with history of HTN, TIA, DM, Chronic Pain, Rheumatoid Arthritis  CC: Follow up  History provided by patient and Records    HPI:  Hypertension Follow up: The patient reports:  taking medications as instructed, no medication side effects noted, no TIA's, no chest pain on exertion, no dyspnea on exertion, noting swelling of ankles, no orthostatic dizziness or lightheadedness, no orthopnea or paroxysmal nocturnal dyspnea. BP Readings from Last 3 Encounters:   08/25/23 (!) 178/62   07/19/23 (!) 154/50   05/25/23 (!) 153/56        Rheumatoid Arthritis: See's Dr Emelyn Dolan for management, Leo Dewitt and is helping significantly. Chronic Pain: Taking Gabapentin and Tramadol. 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 Gabapentin has helped the fingers significantly. Noting persistent leg pains bilaterally with any walking/stairs. Has not done as much exercise. Patient has tried PT/OT with minimal results and failed OTC  medications. GERD: Stable    Constipation: Patient has several months constipation since reducing Metformin. Does not take any medications    Diabetes Follow up: Overall the patient feels well with good energy level. Current Medications: insulin aspart - 100 UNIT/ML  insulin detemir - 100 UNIT/ML  metFORMIN - 1000 MG. Insulin dependence: Yes              Frequency of home glucose testing: Three times daily              Blood Sugar range at home: Morning 100 range, and in the evening in 150's                    Last eye exam: In past 12 months. Last foot exam: This year.               Polyuria, polyphagia or polydipsia: No              Retinopathy: No              Neuropathy SX: No              Low blood sugar symptoms: No              Dietary compliance: compliant most of the time              Medication compliance: compliant most of the time              On ASA: Yes

## 2023-08-29 DIAGNOSIS — Z79.4 TYPE 2 DIABETES MELLITUS WITH STAGE 2 CHRONIC KIDNEY DISEASE, WITH LONG-TERM CURRENT USE OF INSULIN (HCC): ICD-10-CM

## 2023-08-29 DIAGNOSIS — N18.2 TYPE 2 DIABETES MELLITUS WITH STAGE 2 CHRONIC KIDNEY DISEASE, WITH LONG-TERM CURRENT USE OF INSULIN (HCC): ICD-10-CM

## 2023-08-29 DIAGNOSIS — E11.22 TYPE 2 DIABETES MELLITUS WITH STAGE 2 CHRONIC KIDNEY DISEASE, WITH LONG-TERM CURRENT USE OF INSULIN (HCC): ICD-10-CM

## 2023-10-20 ENCOUNTER — TELEPHONE (OUTPATIENT)
Facility: CLINIC | Age: 78
End: 2023-10-20

## 2023-10-20 DIAGNOSIS — I10 ESSENTIAL HYPERTENSION: ICD-10-CM

## 2023-10-20 RX ORDER — METOPROLOL SUCCINATE 50 MG/1
50 TABLET, EXTENDED RELEASE ORAL DAILY
Qty: 90 TABLET | Refills: 1 | Status: SHIPPED | OUTPATIENT
Start: 2023-10-20

## 2023-10-20 NOTE — TELEPHONE ENCOUNTER
Medication ordered.     MD CARLIE Nicole & FABIEN BROWN [facility]  10/20/23

## 2023-10-20 NOTE — TELEPHONE ENCOUNTER
Need refills on Metoprolol ER 50mg please send to [facility] mail order    Thanks

## 2023-10-21 LAB
ALBUMIN SERPL-MCNC: 3.4 G/DL (ref 3.5–5)
ALBUMIN/GLOB SERPL: 1.1 (ref 1.1–2.2)
ALP SERPL-CCNC: 100 U/L (ref 45–117)
ALT SERPL-CCNC: 19 U/L (ref 12–78)
ANION GAP SERPL CALC-SCNC: 5 MMOL/L (ref 5–15)
AST SERPL-CCNC: 14 U/L (ref 15–37)
BASOPHILS # BLD: 0 K/UL (ref 0–0.1)
BASOPHILS NFR BLD: 0 % (ref 0–1)
BILIRUB SERPL-MCNC: 0.2 MG/DL (ref 0.2–1)
BUN SERPL-MCNC: 16 MG/DL (ref 6–20)
BUN/CREAT SERPL: 7 (ref 12–20)
CALCIUM SERPL-MCNC: 9.3 MG/DL (ref 8.5–10.1)
CHLORIDE SERPL-SCNC: 106 MMOL/L (ref 97–108)
CO2 SERPL-SCNC: 28 MMOL/L (ref 21–32)
COMMENT:: NORMAL
CREAT SERPL-MCNC: 2.22 MG/DL (ref 0.55–1.02)
CRP SERPL-MCNC: 0.85 MG/DL (ref 0–0.6)
DIFFERENTIAL METHOD BLD: ABNORMAL
EOSINOPHIL # BLD: 0.1 K/UL (ref 0–0.4)
EOSINOPHIL NFR BLD: 1 % (ref 0–7)
ERYTHROCYTE [DISTWIDTH] IN BLOOD BY AUTOMATED COUNT: 15.4 % (ref 11.5–14.5)
ERYTHROCYTE [SEDIMENTATION RATE] IN BLOOD: 49 MM/HR (ref 0–30)
GLOBULIN SER CALC-MCNC: 3.2 G/DL (ref 2–4)
GLUCOSE SERPL-MCNC: 156 MG/DL (ref 65–100)
HCT VFR BLD AUTO: 28 % (ref 35–47)
HGB BLD-MCNC: 8.6 G/DL (ref 11.5–16)
IMM GRANULOCYTES # BLD AUTO: 0.1 K/UL (ref 0–0.04)
IMM GRANULOCYTES NFR BLD AUTO: 1 % (ref 0–0.5)
LYMPHOCYTES # BLD: 1.5 K/UL (ref 0.8–3.5)
LYMPHOCYTES NFR BLD: 24 % (ref 12–49)
MCH RBC QN AUTO: 28.7 PG (ref 26–34)
MCHC RBC AUTO-ENTMCNC: 30.7 G/DL (ref 30–36.5)
MCV RBC AUTO: 93.3 FL (ref 80–99)
MONOCYTES # BLD: 0.6 K/UL (ref 0–1)
MONOCYTES NFR BLD: 9 % (ref 5–13)
NEUTS SEG # BLD: 4.1 K/UL (ref 1.8–8)
NEUTS SEG NFR BLD: 65 % (ref 32–75)
NRBC # BLD: 0 K/UL (ref 0–0.01)
NRBC BLD-RTO: 0 PER 100 WBC
PLATELET # BLD AUTO: 345 K/UL (ref 150–400)
PMV BLD AUTO: 9.9 FL (ref 8.9–12.9)
POTASSIUM SERPL-SCNC: 4.9 MMOL/L (ref 3.5–5.1)
PROT SERPL-MCNC: 6.6 G/DL (ref 6.4–8.2)
RBC # BLD AUTO: 3 M/UL (ref 3.8–5.2)
SODIUM SERPL-SCNC: 139 MMOL/L (ref 136–145)
SPECIMEN HOLD: NORMAL
WBC # BLD AUTO: 6.4 K/UL (ref 3.6–11)

## 2023-11-05 DIAGNOSIS — G89.4 CHRONIC PAIN SYNDROME: ICD-10-CM

## 2023-11-05 DIAGNOSIS — M81.6 LOCALIZED OSTEOPOROSIS, UNSPECIFIED PATHOLOGICAL FRACTURE PRESENCE: ICD-10-CM

## 2023-11-05 DIAGNOSIS — M79.2 NEURALGIA AND NEURITIS, UNSPECIFIED: ICD-10-CM

## 2023-11-05 DIAGNOSIS — M06.00 SERONEGATIVE RHEUMATOID ARTHRITIS (HCC): ICD-10-CM

## 2023-11-06 RX ORDER — TRAMADOL HYDROCHLORIDE 50 MG/1
50 TABLET ORAL EVERY 8 HOURS PRN
Qty: 270 TABLET | Refills: 0 | Status: SHIPPED | OUTPATIENT
Start: 2023-11-06 | End: 2024-02-04

## 2023-11-06 RX ORDER — GABAPENTIN 300 MG/1
300 CAPSULE ORAL NIGHTLY
Qty: 90 CAPSULE | Refills: 0 | Status: SHIPPED | OUTPATIENT
Start: 2023-11-06 | End: 2024-02-04

## 2023-11-08 DIAGNOSIS — I10 ESSENTIAL HYPERTENSION: ICD-10-CM

## 2023-11-09 RX ORDER — FAMOTIDINE 20 MG/1
40 TABLET, FILM COATED ORAL DAILY
Qty: 180 TABLET | Refills: 1 | Status: SHIPPED | OUTPATIENT
Start: 2023-11-09

## 2023-11-09 RX ORDER — PANTOPRAZOLE SODIUM 40 MG/1
40 TABLET, DELAYED RELEASE ORAL
Qty: 90 TABLET | Refills: 1 | Status: SHIPPED | OUTPATIENT
Start: 2023-11-09

## 2023-11-09 RX ORDER — METOPROLOL SUCCINATE 50 MG/1
50 TABLET, EXTENDED RELEASE ORAL DAILY
Qty: 90 TABLET | Refills: 1 | Status: SHIPPED | OUTPATIENT
Start: 2023-11-09

## 2023-11-27 ENCOUNTER — OFFICE VISIT (OUTPATIENT)
Facility: CLINIC | Age: 78
End: 2023-11-27
Payer: MEDICARE

## 2023-11-27 VITALS
HEIGHT: 65 IN | RESPIRATION RATE: 16 BRPM | TEMPERATURE: 97 F | BODY MASS INDEX: 26.33 KG/M2 | WEIGHT: 158 LBS | SYSTOLIC BLOOD PRESSURE: 157 MMHG | OXYGEN SATURATION: 94 % | DIASTOLIC BLOOD PRESSURE: 61 MMHG | HEART RATE: 64 BPM

## 2023-11-27 DIAGNOSIS — K59.04 CHRONIC IDIOPATHIC CONSTIPATION: ICD-10-CM

## 2023-11-27 DIAGNOSIS — K21.9 GASTROESOPHAGEAL REFLUX DISEASE WITHOUT ESOPHAGITIS: ICD-10-CM

## 2023-11-27 DIAGNOSIS — Z79.4 TYPE 2 DIABETES MELLITUS WITH STAGE 2 CHRONIC KIDNEY DISEASE, WITH LONG-TERM CURRENT USE OF INSULIN (HCC): ICD-10-CM

## 2023-11-27 DIAGNOSIS — E11.40 TYPE 2 DIABETES MELLITUS WITH DIABETIC NEUROPATHY, WITH LONG-TERM CURRENT USE OF INSULIN (HCC): ICD-10-CM

## 2023-11-27 DIAGNOSIS — Z79.4 TYPE 2 DIABETES MELLITUS WITH DIABETIC NEUROPATHY, WITH LONG-TERM CURRENT USE OF INSULIN (HCC): ICD-10-CM

## 2023-11-27 DIAGNOSIS — M06.00 SERONEGATIVE RHEUMATOID ARTHRITIS (HCC): ICD-10-CM

## 2023-11-27 DIAGNOSIS — N18.30 STAGE 3 CHRONIC KIDNEY DISEASE, UNSPECIFIED WHETHER STAGE 3A OR 3B CKD (HCC): ICD-10-CM

## 2023-11-27 DIAGNOSIS — E11.22 TYPE 2 DIABETES MELLITUS WITH STAGE 2 CHRONIC KIDNEY DISEASE, WITH LONG-TERM CURRENT USE OF INSULIN (HCC): ICD-10-CM

## 2023-11-27 DIAGNOSIS — Z91.81 AT HIGH RISK FOR FALLS: ICD-10-CM

## 2023-11-27 DIAGNOSIS — Z00.00 MEDICARE ANNUAL WELLNESS VISIT, SUBSEQUENT: Primary | ICD-10-CM

## 2023-11-27 DIAGNOSIS — I10 WHITE COAT SYNDROME WITH DIAGNOSIS OF HYPERTENSION: ICD-10-CM

## 2023-11-27 DIAGNOSIS — I10 ESSENTIAL HYPERTENSION: ICD-10-CM

## 2023-11-27 DIAGNOSIS — N18.2 TYPE 2 DIABETES MELLITUS WITH STAGE 2 CHRONIC KIDNEY DISEASE, WITH LONG-TERM CURRENT USE OF INSULIN (HCC): ICD-10-CM

## 2023-11-27 DIAGNOSIS — G89.29 OTHER CHRONIC PAIN: ICD-10-CM

## 2023-11-27 DIAGNOSIS — Z86.73 HX OF TRANSIENT ISCHEMIC ATTACK (TIA): ICD-10-CM

## 2023-11-27 PROCEDURE — G0439 PPPS, SUBSEQ VISIT: HCPCS | Performed by: FAMILY MEDICINE

## 2023-11-27 PROCEDURE — 99214 OFFICE O/P EST MOD 30 MIN: CPT | Performed by: FAMILY MEDICINE

## 2023-11-27 PROCEDURE — 1123F ACP DISCUSS/DSCN MKR DOCD: CPT | Performed by: FAMILY MEDICINE

## 2023-11-27 PROCEDURE — 3078F DIAST BP <80 MM HG: CPT | Performed by: FAMILY MEDICINE

## 2023-11-27 PROCEDURE — 3077F SYST BP >= 140 MM HG: CPT | Performed by: FAMILY MEDICINE

## 2023-11-27 PROCEDURE — 3044F HG A1C LEVEL LT 7.0%: CPT | Performed by: FAMILY MEDICINE

## 2023-11-27 ASSESSMENT — LIFESTYLE VARIABLES
HOW MANY STANDARD DRINKS CONTAINING ALCOHOL DO YOU HAVE ON A TYPICAL DAY: PATIENT DOES NOT DRINK
HOW OFTEN DO YOU HAVE A DRINK CONTAINING ALCOHOL: NEVER

## 2023-11-27 ASSESSMENT — ENCOUNTER SYMPTOMS
CHEST TIGHTNESS: 0
ABDOMINAL PAIN: 0

## 2023-11-27 ASSESSMENT — PATIENT HEALTH QUESTIONNAIRE - PHQ9
2. FEELING DOWN, DEPRESSED OR HOPELESS: 0
SUM OF ALL RESPONSES TO PHQ QUESTIONS 1-9: 0
SUM OF ALL RESPONSES TO PHQ QUESTIONS 1-9: 0
SUM OF ALL RESPONSES TO PHQ9 QUESTIONS 1 & 2: 0
1. LITTLE INTEREST OR PLEASURE IN DOING THINGS: 0
SUM OF ALL RESPONSES TO PHQ QUESTIONS 1-9: 0
SUM OF ALL RESPONSES TO PHQ QUESTIONS 1-9: 0

## 2023-11-27 NOTE — PROGRESS NOTES
1. \"Have you been to the ER, urgent care clinic since your last visit? Hospitalized since your last visit? \" no    2. \"Have you seen or consulted any other health care providers outside of the [address] [place] [address] since your last visit? \" yes     Health Maintenance Due   Topic Date Due    DTaP/Tdap/Td vaccine (1 - Tdap) Never done    Hepatitis B vaccine (1 of 3 - Risk 3-dose series) Never done    Pneumococcal 65+ years Vaccine (2 - PCV) 10/22/2021    Annual Wellness Visit (AWV)  02/25/2023    Flu vaccine (1) 08/01/2023    COVID-19 Vaccine (3 - 2023-24 season) 09/01/2023
Medicare Annual Wellness Visit    Sandrine Mays is here for Annual Exam and Medicare AWV    Assessment & Plan   Essential hypertension  -     Comprehensive Metabolic Panel; Future  -     CBC; Future  Type 2 diabetes mellitus with stage 2 chronic kidney disease, with long-term current use of insulin (HCC)  -     metFORMIN (GLUCOPHAGE) 1000 MG tablet; Take 1 tablet by mouth 2 times daily (with meals), Disp-180 tablet, R-1Normal  At high risk for falls  White coat syndrome with diagnosis of hypertension  Gastroesophageal reflux disease without esophagitis  Type 2 diabetes mellitus with diabetic neuropathy, with long-term current use of insulin (HCC)  -     Hemoglobin A1C; Future  -     Lipid Panel; Future  Seronegative rheumatoid arthritis ([address])  Stage 3 chronic kidney disease, unspecified whether stage 3a or 3b CKD (HCC)  Hx of transient ischemic attack (TIA)  Other chronic pain  -     Compliance Drug Analysis, Urine; Future  Chronic idiopathic constipation  Medicare annual wellness visit, subsequent    Recommendations for Preventive Services Due: see orders and patient instructions/AVS.  Recommended screening schedule for the next 5-10 years is provided to the patient in written form: see Patient Instructions/AVS.     Return in about 3 months (around 2/27/2024). Subjective     Patient's complete Health Risk Assessment and screening values have been reviewed and are found in Flowsheets. The following problems were reviewed today and where indicated follow up appointments were made and/or referrals ordered. Positive Risk Factor Screenings with Interventions:    Fall Risk:  Do you feel unsteady or are you worried about falling? : no  2 or more falls in past year?: no  Fall with injury in past year?: (!) yes     Interventions: Take care         Controlled Medication Review:     Today's Pain Level: Pain Score: Zero     Opioid Risk: (Low risk score <55) Opioid risk score: 12    Patient is low risk for opioid use
patient as well.     MD CARLIE Bass & FABIEN BROWN [facility]  11/27/23

## 2023-11-28 LAB
ALBUMIN SERPL-MCNC: 3.6 G/DL (ref 3.5–5)
ALBUMIN/GLOB SERPL: 1.1 (ref 1.1–2.2)
ALP SERPL-CCNC: 106 U/L (ref 45–117)
ALT SERPL-CCNC: 19 U/L (ref 12–78)
ANION GAP SERPL CALC-SCNC: 6 MMOL/L (ref 5–15)
AST SERPL-CCNC: 19 U/L (ref 15–37)
BILIRUB SERPL-MCNC: 0.1 MG/DL (ref 0.2–1)
BUN SERPL-MCNC: 22 MG/DL (ref 6–20)
BUN/CREAT SERPL: 10 (ref 12–20)
CALCIUM SERPL-MCNC: 9.6 MG/DL (ref 8.5–10.1)
CHLORIDE SERPL-SCNC: 107 MMOL/L (ref 97–108)
CHOLEST SERPL-MCNC: 215 MG/DL
CO2 SERPL-SCNC: 27 MMOL/L (ref 21–32)
CREAT SERPL-MCNC: 2.15 MG/DL (ref 0.55–1.02)
ERYTHROCYTE [DISTWIDTH] IN BLOOD BY AUTOMATED COUNT: 16.3 % (ref 11.5–14.5)
EST. AVERAGE GLUCOSE BLD GHB EST-MCNC: 151 MG/DL
GLOBULIN SER CALC-MCNC: 3.4 G/DL (ref 2–4)
GLUCOSE SERPL-MCNC: 145 MG/DL (ref 65–100)
HBA1C MFR BLD: 6.9 % (ref 4–5.6)
HCT VFR BLD AUTO: 26.7 % (ref 35–47)
HDLC SERPL-MCNC: 56 MG/DL
HDLC SERPL: 3.8 (ref 0–5)
HGB BLD-MCNC: 8.4 G/DL (ref 11.5–16)
LDLC SERPL CALC-MCNC: 103.8 MG/DL (ref 0–100)
MCH RBC QN AUTO: 28.4 PG (ref 26–34)
MCHC RBC AUTO-ENTMCNC: 31.5 G/DL (ref 30–36.5)
MCV RBC AUTO: 90.2 FL (ref 80–99)
NRBC # BLD: 0.03 K/UL (ref 0–0.01)
NRBC BLD-RTO: 0.4 PER 100 WBC
PLATELET # BLD AUTO: 378 K/UL (ref 150–400)
PMV BLD AUTO: 10.2 FL (ref 8.9–12.9)
POTASSIUM SERPL-SCNC: 5 MMOL/L (ref 3.5–5.1)
PROT SERPL-MCNC: 7 G/DL (ref 6.4–8.2)
RBC # BLD AUTO: 2.96 M/UL (ref 3.8–5.2)
SODIUM SERPL-SCNC: 140 MMOL/L (ref 136–145)
TRIGL SERPL-MCNC: 276 MG/DL
VLDLC SERPL CALC-MCNC: 55.2 MG/DL
WBC # BLD AUTO: 7 K/UL (ref 3.6–11)

## 2023-11-29 LAB — DRUGS UR: NORMAL

## 2024-01-20 DIAGNOSIS — M81.6 LOCALIZED OSTEOPOROSIS, UNSPECIFIED PATHOLOGICAL FRACTURE PRESENCE: ICD-10-CM

## 2024-01-20 DIAGNOSIS — Z86.73 HX OF TRANSIENT ISCHEMIC ATTACK (TIA): ICD-10-CM

## 2024-01-20 DIAGNOSIS — N18.2 TYPE 2 DIABETES MELLITUS WITH STAGE 2 CHRONIC KIDNEY DISEASE, WITH LONG-TERM CURRENT USE OF INSULIN (HCC): ICD-10-CM

## 2024-01-20 DIAGNOSIS — G89.4 CHRONIC PAIN SYNDROME: ICD-10-CM

## 2024-01-20 DIAGNOSIS — Z79.4 TYPE 2 DIABETES MELLITUS WITH STAGE 2 CHRONIC KIDNEY DISEASE, WITH LONG-TERM CURRENT USE OF INSULIN (HCC): ICD-10-CM

## 2024-01-20 DIAGNOSIS — M06.00 SERONEGATIVE RHEUMATOID ARTHRITIS (HCC): ICD-10-CM

## 2024-01-20 DIAGNOSIS — E11.22 TYPE 2 DIABETES MELLITUS WITH STAGE 2 CHRONIC KIDNEY DISEASE, WITH LONG-TERM CURRENT USE OF INSULIN (HCC): ICD-10-CM

## 2024-01-20 DIAGNOSIS — I10 ESSENTIAL HYPERTENSION: ICD-10-CM

## 2024-01-20 DIAGNOSIS — M79.2 NEURALGIA AND NEURITIS, UNSPECIFIED: ICD-10-CM

## 2024-01-22 RX ORDER — CLOPIDOGREL BISULFATE 75 MG/1
75 TABLET ORAL DAILY
Qty: 90 TABLET | Refills: 1 | Status: SHIPPED | OUTPATIENT
Start: 2024-01-22

## 2024-01-22 RX ORDER — AMLODIPINE BESYLATE 5 MG/1
5 TABLET ORAL DAILY
Qty: 90 TABLET | Refills: 1 | Status: SHIPPED | OUTPATIENT
Start: 2024-01-22

## 2024-01-22 RX ORDER — SIMVASTATIN 20 MG
20 TABLET ORAL DAILY
Qty: 90 TABLET | Refills: 1 | Status: SHIPPED | OUTPATIENT
Start: 2024-01-22

## 2024-01-22 RX ORDER — MECLIZINE HCL 12.5 MG/1
12.5 TABLET ORAL NIGHTLY
Qty: 90 TABLET | Refills: 1 | Status: SHIPPED | OUTPATIENT
Start: 2024-01-22

## 2024-01-22 RX ORDER — TRAMADOL HYDROCHLORIDE 50 MG/1
50 TABLET ORAL EVERY 8 HOURS PRN
Qty: 270 TABLET | Refills: 0 | Status: SHIPPED | OUTPATIENT
Start: 2024-01-22 | End: 2024-04-21

## 2024-01-22 RX ORDER — GABAPENTIN 300 MG/1
CAPSULE ORAL
Qty: 90 CAPSULE | Refills: 0 | Status: SHIPPED | OUTPATIENT
Start: 2024-01-22 | End: 2024-02-21

## 2024-02-26 ENCOUNTER — OFFICE VISIT (OUTPATIENT)
Facility: CLINIC | Age: 79
End: 2024-02-26
Payer: MEDICARE

## 2024-02-26 VITALS
DIASTOLIC BLOOD PRESSURE: 62 MMHG | RESPIRATION RATE: 20 BRPM | TEMPERATURE: 97.9 F | WEIGHT: 145 LBS | BODY MASS INDEX: 24.16 KG/M2 | HEART RATE: 58 BPM | SYSTOLIC BLOOD PRESSURE: 180 MMHG | HEIGHT: 65 IN | OXYGEN SATURATION: 99 %

## 2024-02-26 DIAGNOSIS — D63.1 ANEMIA DUE TO STAGE 3 CHRONIC KIDNEY DISEASE, UNSPECIFIED WHETHER STAGE 3A OR 3B CKD (HCC): ICD-10-CM

## 2024-02-26 DIAGNOSIS — Z79.4 TYPE 2 DIABETES MELLITUS WITH STAGE 2 CHRONIC KIDNEY DISEASE, WITH LONG-TERM CURRENT USE OF INSULIN (HCC): ICD-10-CM

## 2024-02-26 DIAGNOSIS — K21.9 GASTROESOPHAGEAL REFLUX DISEASE WITHOUT ESOPHAGITIS: ICD-10-CM

## 2024-02-26 DIAGNOSIS — E11.40 TYPE 2 DIABETES MELLITUS WITH DIABETIC NEUROPATHY, WITH LONG-TERM CURRENT USE OF INSULIN (HCC): ICD-10-CM

## 2024-02-26 DIAGNOSIS — N18.30 ANEMIA DUE TO STAGE 3 CHRONIC KIDNEY DISEASE, UNSPECIFIED WHETHER STAGE 3A OR 3B CKD (HCC): ICD-10-CM

## 2024-02-26 DIAGNOSIS — M81.0 OSTEOPOROSIS WITHOUT CURRENT PATHOLOGICAL FRACTURE, UNSPECIFIED OSTEOPOROSIS TYPE: ICD-10-CM

## 2024-02-26 DIAGNOSIS — Z79.4 TYPE 2 DIABETES MELLITUS WITH DIABETIC NEUROPATHY, WITH LONG-TERM CURRENT USE OF INSULIN (HCC): ICD-10-CM

## 2024-02-26 DIAGNOSIS — Z00.00 MEDICARE ANNUAL WELLNESS VISIT, SUBSEQUENT: Primary | ICD-10-CM

## 2024-02-26 DIAGNOSIS — N18.2 TYPE 2 DIABETES MELLITUS WITH STAGE 2 CHRONIC KIDNEY DISEASE, WITH LONG-TERM CURRENT USE OF INSULIN (HCC): ICD-10-CM

## 2024-02-26 DIAGNOSIS — I10 ESSENTIAL HYPERTENSION: ICD-10-CM

## 2024-02-26 DIAGNOSIS — N18.30 STAGE 3 CHRONIC KIDNEY DISEASE, UNSPECIFIED WHETHER STAGE 3A OR 3B CKD (HCC): ICD-10-CM

## 2024-02-26 DIAGNOSIS — E11.22 TYPE 2 DIABETES MELLITUS WITH STAGE 2 CHRONIC KIDNEY DISEASE, WITH LONG-TERM CURRENT USE OF INSULIN (HCC): ICD-10-CM

## 2024-02-26 PROCEDURE — G0439 PPPS, SUBSEQ VISIT: HCPCS | Performed by: FAMILY MEDICINE

## 2024-02-26 PROCEDURE — 99214 OFFICE O/P EST MOD 30 MIN: CPT | Performed by: FAMILY MEDICINE

## 2024-02-26 PROCEDURE — 1123F ACP DISCUSS/DSCN MKR DOCD: CPT | Performed by: FAMILY MEDICINE

## 2024-02-26 PROCEDURE — 3077F SYST BP >= 140 MM HG: CPT | Performed by: FAMILY MEDICINE

## 2024-02-26 PROCEDURE — 3078F DIAST BP <80 MM HG: CPT | Performed by: FAMILY MEDICINE

## 2024-02-26 ASSESSMENT — LIFESTYLE VARIABLES
HOW OFTEN DO YOU HAVE A DRINK CONTAINING ALCOHOL: NEVER
HOW MANY STANDARD DRINKS CONTAINING ALCOHOL DO YOU HAVE ON A TYPICAL DAY: PATIENT DOES NOT DRINK

## 2024-02-26 ASSESSMENT — ENCOUNTER SYMPTOMS
BACK PAIN: 1
APNEA: 0
CHEST TIGHTNESS: 0
ABDOMINAL PAIN: 0
CHOKING: 0

## 2024-02-26 ASSESSMENT — PATIENT HEALTH QUESTIONNAIRE - PHQ9
2. FEELING DOWN, DEPRESSED OR HOPELESS: 0
SUM OF ALL RESPONSES TO PHQ QUESTIONS 1-9: 0
SUM OF ALL RESPONSES TO PHQ QUESTIONS 1-9: 0
1. LITTLE INTEREST OR PLEASURE IN DOING THINGS: 0
SUM OF ALL RESPONSES TO PHQ9 QUESTIONS 1 & 2: 0
SUM OF ALL RESPONSES TO PHQ QUESTIONS 1-9: 0
SUM OF ALL RESPONSES TO PHQ QUESTIONS 1-9: 0

## 2024-02-26 NOTE — PROGRESS NOTES
[facility]    History of Present Illness:   Mariely Carty is a 78 y.o. female with history of HTN, TIA, DM, Chronic Pain, Rheumatoid Arthritis   CC: Medicare wellness, Follow up   History provided by patient and Records    HPI:  Hypertension Follow up:  The patient reports:  taking medications as instructed, no medication side effects noted, no TIA's, no chest pain on exertion, no dyspnea on exertion, no swelling of ankles, no orthostatic dizziness or lightheadedness, no orthopnea or paroxysmal nocturnal dyspnea.     BP Readings from Last 3 Encounters:   02/26/24 (!) 180/62   11/27/23 (!) 157/61   08/25/23 (!) 178/62      Rheumatoid Arthritis: See's Dr. Pisano for management, Taking Xeljanz and is helping significantly.     Chronic Pain: Taking Gabapentin and Tramadol.  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 Gabapentin has helped the fingers significantly.  Noting persistent leg pains bilaterally with any walking/stairs.  Has not done as much exercise. Patient has tried PT/OT with minimal results and failed OTC  medications. Patient started a patch called Signal Relief    GERD: Stable     Diabetes Follow up: Overall the patient feels well with good energy level.     Current Medications: insulin aspart - 100 UNIT/ML  insulin detemir - 100 UNIT/ML  metFORMIN - 1000 MG.   Insulin dependence: Yes              Frequency of home glucose testing: Three times daily              Blood Sugar range at home: Morning 100 range, and in the evening in 150's                    Last eye exam: In past 12 months.              Last foot exam: This year.              Polyuria, polyphagia or polydipsia: No              Retinopathy: No              Neuropathy SX: No              Low blood sugar symptoms: No              Dietary compliance: compliant most of the time              Medication compliance: compliant most of the time              On ASA: Yes              Tobacco Use: No              Depression:

## 2024-02-26 NOTE — PROGRESS NOTES
Medicare Annual Wellness Visit    Mariely Carty is here for Follow-up Chronic Condition and Medicare AWV    Assessment & Plan   Medicare annual wellness visit, subsequent  Essential hypertension  -     Comprehensive Metabolic Panel; Future  -     CBC; Future  Gastroesophageal reflux disease without esophagitis  Type 2 diabetes mellitus with stage 2 chronic kidney disease, with long-term current use of insulin (HCC)  -     Hemoglobin A1C; Future  -     Lipid Panel; Future  -     insulin detemir (LEVEMIR) 100 UNIT/ML injection vial; Take 25 units in the morning and 15 units at night.  Dx. E11.9, Disp-36 mL, R-1Normal  Type 2 diabetes mellitus with diabetic neuropathy, with long-term current use of insulin (HCC)  Osteoporosis without current pathological fracture, unspecified osteoporosis type  Stage 3 chronic kidney disease, unspecified whether stage 3a or 3b CKD ([facility])  -     Vitamin D 25 Hydroxy; Future  Anemia due to stage 3 chronic kidney disease, unspecified whether stage 3a or 3b CKD (HCC)  -     Vitamin B12; Future  -     Iron and TIBC; Future  -     Ferritin; Future    Recommendations for Preventive Services Due: see orders and patient instructions/AVS.  Recommended screening schedule for the next 5-10 years is provided to the patient in written form: see Patient Instructions/AVS.     Return in about 3 months (around 5/26/2024).     Subjective     Patient's complete Health Risk Assessment and screening values have been reviewed and are found in Flowsheets. The following problems were reviewed today and where indicated follow up appointments were made and/or referrals ordered.    Positive Risk Factor Screenings with Interventions:       Cognitive:   Clock Drawing Test (CDT): Normal  Words recalled: 0 Words Recalled  Total Score: (!) 2  Total Score Interpretation: Abnormal Mini-Cog  Interventions:  Puzzles        Controlled Medication Review:    Today's Pain Level: Pain Score: Zero     Opioid Risk: (Low risk

## 2024-02-27 LAB
25(OH)D3 SERPL-MCNC: 31.4 NG/ML (ref 30–100)
ALBUMIN SERPL-MCNC: 3.6 G/DL (ref 3.5–5)
ALBUMIN/GLOB SERPL: 1.1 (ref 1.1–2.2)
ALP SERPL-CCNC: 111 U/L (ref 45–117)
ALT SERPL-CCNC: 15 U/L (ref 12–78)
ANION GAP SERPL CALC-SCNC: 0 MMOL/L (ref 5–15)
AST SERPL-CCNC: 20 U/L (ref 15–37)
BILIRUB SERPL-MCNC: 0.3 MG/DL (ref 0.2–1)
BUN SERPL-MCNC: 15 MG/DL (ref 6–20)
BUN/CREAT SERPL: 8 (ref 12–20)
CALCIUM SERPL-MCNC: 9.3 MG/DL (ref 8.5–10.1)
CHLORIDE SERPL-SCNC: 109 MMOL/L (ref 97–108)
CHOLEST SERPL-MCNC: 169 MG/DL
CO2 SERPL-SCNC: 28 MMOL/L (ref 21–32)
CREAT SERPL-MCNC: 1.9 MG/DL (ref 0.55–1.02)
ERYTHROCYTE [DISTWIDTH] IN BLOOD BY AUTOMATED COUNT: 14.3 % (ref 11.5–14.5)
EST. AVERAGE GLUCOSE BLD GHB EST-MCNC: 128 MG/DL
FERRITIN SERPL-MCNC: 22 NG/ML (ref 8–252)
GLOBULIN SER CALC-MCNC: 3.3 G/DL (ref 2–4)
GLUCOSE SERPL-MCNC: 130 MG/DL (ref 65–100)
HBA1C MFR BLD: 6.1 % (ref 4–5.6)
HCT VFR BLD AUTO: 29.4 % (ref 35–47)
HDLC SERPL-MCNC: 65 MG/DL
HDLC SERPL: 2.6 (ref 0–5)
HGB BLD-MCNC: 9.2 G/DL (ref 11.5–16)
IRON SATN MFR SERPL: 23 % (ref 20–50)
IRON SERPL-MCNC: 78 UG/DL (ref 35–150)
LDLC SERPL CALC-MCNC: 83.4 MG/DL (ref 0–100)
MCH RBC QN AUTO: 28.7 PG (ref 26–34)
MCHC RBC AUTO-ENTMCNC: 31.3 G/DL (ref 30–36.5)
MCV RBC AUTO: 91.6 FL (ref 80–99)
NRBC # BLD: 0 K/UL (ref 0–0.01)
NRBC BLD-RTO: 0 PER 100 WBC
PLATELET # BLD AUTO: 331 K/UL (ref 150–400)
PMV BLD AUTO: 10.7 FL (ref 8.9–12.9)
POTASSIUM SERPL-SCNC: 4.7 MMOL/L (ref 3.5–5.1)
PROT SERPL-MCNC: 6.9 G/DL (ref 6.4–8.2)
RBC # BLD AUTO: 3.21 M/UL (ref 3.8–5.2)
SODIUM SERPL-SCNC: 137 MMOL/L (ref 136–145)
TIBC SERPL-MCNC: 333 UG/DL (ref 250–450)
TRIGL SERPL-MCNC: 103 MG/DL
VIT B12 SERPL-MCNC: >2000 PG/ML (ref 193–986)
VLDLC SERPL CALC-MCNC: 20.6 MG/DL
WBC # BLD AUTO: 5.9 K/UL (ref 3.6–11)

## 2024-04-21 DIAGNOSIS — M06.00 SERONEGATIVE RHEUMATOID ARTHRITIS (HCC): ICD-10-CM

## 2024-04-21 DIAGNOSIS — M79.2 NEURALGIA AND NEURITIS, UNSPECIFIED: ICD-10-CM

## 2024-04-21 DIAGNOSIS — G89.4 CHRONIC PAIN SYNDROME: ICD-10-CM

## 2024-04-21 DIAGNOSIS — K21.9 GASTROESOPHAGEAL REFLUX DISEASE WITHOUT ESOPHAGITIS: Primary | ICD-10-CM

## 2024-04-21 DIAGNOSIS — M81.6 LOCALIZED OSTEOPOROSIS, UNSPECIFIED PATHOLOGICAL FRACTURE PRESENCE: ICD-10-CM

## 2024-04-22 RX ORDER — GABAPENTIN 300 MG/1
CAPSULE ORAL
Qty: 90 CAPSULE | Refills: 0 | Status: SHIPPED | OUTPATIENT
Start: 2024-04-22 | End: 2024-05-22

## 2024-04-22 RX ORDER — PANTOPRAZOLE SODIUM 40 MG/1
TABLET, DELAYED RELEASE ORAL
Qty: 90 TABLET | Refills: 1 | Status: SHIPPED | OUTPATIENT
Start: 2024-04-22

## 2024-04-22 RX ORDER — TRAMADOL HYDROCHLORIDE 50 MG/1
50 TABLET ORAL EVERY 8 HOURS PRN
Qty: 270 TABLET | Refills: 0 | Status: SHIPPED | OUTPATIENT
Start: 2024-04-22 | End: 2024-07-21

## 2024-04-22 RX ORDER — FAMOTIDINE 20 MG/1
40 TABLET, FILM COATED ORAL DAILY
Qty: 180 TABLET | Refills: 1 | Status: SHIPPED | OUTPATIENT
Start: 2024-04-22

## 2024-06-24 ENCOUNTER — OFFICE VISIT (OUTPATIENT)
Facility: CLINIC | Age: 79
End: 2024-06-24
Payer: MEDICARE

## 2024-06-24 VITALS
TEMPERATURE: 98.1 F | RESPIRATION RATE: 16 BRPM | OXYGEN SATURATION: 97 % | SYSTOLIC BLOOD PRESSURE: 167 MMHG | HEART RATE: 57 BPM | BODY MASS INDEX: 24.13 KG/M2 | DIASTOLIC BLOOD PRESSURE: 51 MMHG | WEIGHT: 145 LBS

## 2024-06-24 DIAGNOSIS — N18.32 CHRONIC KIDNEY DISEASE, STAGE 3B (HCC): ICD-10-CM

## 2024-06-24 DIAGNOSIS — G89.29 OTHER CHRONIC PAIN: ICD-10-CM

## 2024-06-24 DIAGNOSIS — M06.00 SERONEGATIVE RHEUMATOID ARTHRITIS (HCC): ICD-10-CM

## 2024-06-24 DIAGNOSIS — M79.605 PAIN IN BOTH LOWER EXTREMITIES: ICD-10-CM

## 2024-06-24 DIAGNOSIS — E11.22 TYPE 2 DIABETES MELLITUS WITH STAGE 2 CHRONIC KIDNEY DISEASE, WITH LONG-TERM CURRENT USE OF INSULIN (HCC): ICD-10-CM

## 2024-06-24 DIAGNOSIS — Z79.4 TYPE 2 DIABETES MELLITUS WITH STAGE 2 CHRONIC KIDNEY DISEASE, WITH LONG-TERM CURRENT USE OF INSULIN (HCC): ICD-10-CM

## 2024-06-24 DIAGNOSIS — I87.2 VENOUS INSUFFICIENCY: ICD-10-CM

## 2024-06-24 DIAGNOSIS — M79.604 PAIN IN BOTH LOWER EXTREMITIES: ICD-10-CM

## 2024-06-24 DIAGNOSIS — K21.9 GASTROESOPHAGEAL REFLUX DISEASE WITHOUT ESOPHAGITIS: ICD-10-CM

## 2024-06-24 DIAGNOSIS — I10 WHITE COAT SYNDROME WITH DIAGNOSIS OF HYPERTENSION: Primary | ICD-10-CM

## 2024-06-24 DIAGNOSIS — N18.2 TYPE 2 DIABETES MELLITUS WITH STAGE 2 CHRONIC KIDNEY DISEASE, WITH LONG-TERM CURRENT USE OF INSULIN (HCC): ICD-10-CM

## 2024-06-24 DIAGNOSIS — I10 ESSENTIAL HYPERTENSION: ICD-10-CM

## 2024-06-24 PROCEDURE — 3078F DIAST BP <80 MM HG: CPT | Performed by: FAMILY MEDICINE

## 2024-06-24 PROCEDURE — 3077F SYST BP >= 140 MM HG: CPT | Performed by: FAMILY MEDICINE

## 2024-06-24 PROCEDURE — 1123F ACP DISCUSS/DSCN MKR DOCD: CPT | Performed by: FAMILY MEDICINE

## 2024-06-24 PROCEDURE — 99214 OFFICE O/P EST MOD 30 MIN: CPT | Performed by: FAMILY MEDICINE

## 2024-06-24 PROCEDURE — 3044F HG A1C LEVEL LT 7.0%: CPT | Performed by: FAMILY MEDICINE

## 2024-06-24 RX ORDER — FUROSEMIDE 20 MG/1
10 TABLET ORAL DAILY PRN
Qty: 60 TABLET | Refills: 0 | Status: SHIPPED | OUTPATIENT
Start: 2024-06-24

## 2024-06-24 SDOH — ECONOMIC STABILITY: FOOD INSECURITY: WITHIN THE PAST 12 MONTHS, YOU WORRIED THAT YOUR FOOD WOULD RUN OUT BEFORE YOU GOT MONEY TO BUY MORE.: NEVER TRUE

## 2024-06-24 SDOH — ECONOMIC STABILITY: FOOD INSECURITY: WITHIN THE PAST 12 MONTHS, THE FOOD YOU BOUGHT JUST DIDN'T LAST AND YOU DIDN'T HAVE MONEY TO GET MORE.: NEVER TRUE

## 2024-06-24 SDOH — ECONOMIC STABILITY: INCOME INSECURITY: HOW HARD IS IT FOR YOU TO PAY FOR THE VERY BASICS LIKE FOOD, HOUSING, MEDICAL CARE, AND HEATING?: NOT VERY HARD

## 2024-06-24 SDOH — ECONOMIC STABILITY: TRANSPORTATION INSECURITY
IN THE PAST 12 MONTHS, HAS LACK OF TRANSPORTATION KEPT YOU FROM MEETINGS, WORK, OR FROM GETTING THINGS NEEDED FOR DAILY LIVING?: NO

## 2024-06-24 ASSESSMENT — ENCOUNTER SYMPTOMS
CONSTIPATION: 1
ABDOMINAL PAIN: 0
CHEST TIGHTNESS: 0
APNEA: 0

## 2024-06-24 NOTE — PROGRESS NOTES
Reviewed record in preparation for visit and have necessary documentation  Pt did not bring medication to office visit for review  opportunity was given for questions  \"Have you been to the ER, urgent care clinic since your last visit?  Hospitalized since your last visit?\"    NO    “Have you seen or consulted any other health care providers outside of [facility] since your last visit?”    NO      Click Here for Release of Records Request     Goals that were addressed and/or need to be completed during or after this appointment include   Health Maintenance Due   Topic Date Due    Respiratory Syncytial Virus (RSV) Pregnant or age 60 yrs+ (1 - 1-dose 60+ series) Never done    COVID-19 Vaccine (3 - 2023-24 season) 09/01/2023      
education: None    Highest education level: None   Tobacco Use    Smoking status: Never    Smokeless tobacco: Never   Vaping Use    Vaping Use: Never used   Substance and Sexual Activity    Alcohol use: Never    Drug use: Never    Sexual activity: Defer   Social History Narrative    [employer] school and course of Psychology     Social Determinants of Health     Financial Resource Strain: Low Risk  (6/24/2024)    Overall Financial Resource Strain (CARDIA)     Difficulty of Paying Living Expenses: Not very hard   Food Insecurity: No Food Insecurity (6/24/2024)    Hunger Vital Sign     Worried About Running Out of Food in the Last Year: Never true     Ran Out of Food in the Last Year: Never true   Transportation Needs: Unknown (6/24/2024)    PRAPARE - Transportation     Lack of Transportation (Non-Medical): No   Physical Activity: Insufficiently Active (2/26/2024)    Exercise Vital Sign     Days of Exercise per Week: 1 day     Minutes of Exercise per Session: 10 min   Housing Stability: Unknown (6/24/2024)    Housing Stability Vital Sign     Unstable Housing in the Last Year: No        Review of Systems   Review of Systems   Constitutional:  Negative for activity change and appetite change.   Respiratory:  Negative for apnea and chest tightness.    Cardiovascular:  Negative for chest pain.   Gastrointestinal:  Positive for constipation. Negative for abdominal pain.         Objective:   BP (!) 174/56   Pulse 57   Temp 98.1 °F (36.7 °C)   Resp 16   Wt 65.8 kg (145 lb)   SpO2 97%   BMI 24.13 kg/m²      Physical Exam  Vitals and nursing note reviewed.   Constitutional:       Appearance: Normal appearance.   HENT:      Head: Normocephalic and atraumatic.      Nose: No congestion.   Cardiovascular:      Rate and Rhythm: Normal rate and regular rhythm.      Pulses: Normal pulses.      Heart sounds: Normal heart sounds. No murmur heard.     No friction rub. No gallop.   Pulmonary:      Effort: Pulmonary effort is

## 2024-06-24 NOTE — PATIENT INSTRUCTIONS
- Colace/Ducolax Every day.  - Miralax 17 g Package at least once a daily until moving bowels regularly.  - Hold MOM for now.      [facility]  Affiliated with [address]  [address] [phone]    Monitor blood pressure outside the office several times weekly at different times during the day and evening.   Blood Pressure Record     Patient Name:  _____Mariely Carty_________________ :  ________[id number]________    Date/Time BP Reading Pulse

## 2024-06-25 LAB
ALBUMIN SERPL-MCNC: 3.6 G/DL (ref 3.5–5)
ALBUMIN/GLOB SERPL: 1 (ref 1.1–2.2)
ALP SERPL-CCNC: 80 U/L (ref 45–117)
ALT SERPL-CCNC: 14 U/L (ref 12–78)
ANION GAP SERPL CALC-SCNC: 6 MMOL/L (ref 5–15)
AST SERPL-CCNC: 17 U/L (ref 15–37)
BILIRUB SERPL-MCNC: 0.3 MG/DL (ref 0.2–1)
BUN SERPL-MCNC: 21 MG/DL (ref 6–20)
BUN/CREAT SERPL: 10 (ref 12–20)
CALCIUM SERPL-MCNC: 9.5 MG/DL (ref 8.5–10.1)
CHLORIDE SERPL-SCNC: 108 MMOL/L (ref 97–108)
CHOLEST SERPL-MCNC: 154 MG/DL
CO2 SERPL-SCNC: 26 MMOL/L (ref 21–32)
CREAT SERPL-MCNC: 2.16 MG/DL (ref 0.55–1.02)
ERYTHROCYTE [DISTWIDTH] IN BLOOD BY AUTOMATED COUNT: 16.9 % (ref 11.5–14.5)
EST. AVERAGE GLUCOSE BLD GHB EST-MCNC: 143 MG/DL
GLOBULIN SER CALC-MCNC: 3.5 G/DL (ref 2–4)
GLUCOSE SERPL-MCNC: 88 MG/DL (ref 65–100)
HBA1C MFR BLD: 6.6 % (ref 4–5.6)
HCT VFR BLD AUTO: 28.7 % (ref 35–47)
HDLC SERPL-MCNC: 61 MG/DL
HDLC SERPL: 2.5 (ref 0–5)
HGB BLD-MCNC: 8.8 G/DL (ref 11.5–16)
LDLC SERPL CALC-MCNC: 73.8 MG/DL (ref 0–100)
MCH RBC QN AUTO: 29.6 PG (ref 26–34)
MCHC RBC AUTO-ENTMCNC: 30.7 G/DL (ref 30–36.5)
MCV RBC AUTO: 96.6 FL (ref 80–99)
NRBC # BLD: 0 K/UL (ref 0–0.01)
NRBC BLD-RTO: 0 PER 100 WBC
PLATELET # BLD AUTO: 405 K/UL (ref 150–400)
PMV BLD AUTO: 10.5 FL (ref 8.9–12.9)
POTASSIUM SERPL-SCNC: 5 MMOL/L (ref 3.5–5.1)
PROT SERPL-MCNC: 7.1 G/DL (ref 6.4–8.2)
RBC # BLD AUTO: 2.97 M/UL (ref 3.8–5.2)
SODIUM SERPL-SCNC: 140 MMOL/L (ref 136–145)
TRIGL SERPL-MCNC: 96 MG/DL
VLDLC SERPL CALC-MCNC: 19.2 MG/DL
WBC # BLD AUTO: 11.1 K/UL (ref 3.6–11)

## 2024-06-29 LAB — DRUGS UR: NORMAL

## 2024-07-02 DIAGNOSIS — M79.2 NEURALGIA AND NEURITIS, UNSPECIFIED: ICD-10-CM

## 2024-07-02 DIAGNOSIS — E11.22 TYPE 2 DIABETES MELLITUS WITH STAGE 2 CHRONIC KIDNEY DISEASE, WITH LONG-TERM CURRENT USE OF INSULIN (HCC): ICD-10-CM

## 2024-07-02 DIAGNOSIS — M81.6 LOCALIZED OSTEOPOROSIS, UNSPECIFIED PATHOLOGICAL FRACTURE PRESENCE: ICD-10-CM

## 2024-07-02 DIAGNOSIS — Z86.73 HX OF TRANSIENT ISCHEMIC ATTACK (TIA): ICD-10-CM

## 2024-07-02 DIAGNOSIS — Z79.4 TYPE 2 DIABETES MELLITUS WITH STAGE 2 CHRONIC KIDNEY DISEASE, WITH LONG-TERM CURRENT USE OF INSULIN (HCC): ICD-10-CM

## 2024-07-02 DIAGNOSIS — N18.2 TYPE 2 DIABETES MELLITUS WITH STAGE 2 CHRONIC KIDNEY DISEASE, WITH LONG-TERM CURRENT USE OF INSULIN (HCC): ICD-10-CM

## 2024-07-02 DIAGNOSIS — I10 ESSENTIAL HYPERTENSION: ICD-10-CM

## 2024-07-02 DIAGNOSIS — G89.4 CHRONIC PAIN SYNDROME: ICD-10-CM

## 2024-07-02 DIAGNOSIS — M06.00 SERONEGATIVE RHEUMATOID ARTHRITIS (HCC): ICD-10-CM

## 2024-07-02 RX ORDER — MECLIZINE HCL 12.5 MG/1
12.5 TABLET ORAL NIGHTLY
Qty: 90 TABLET | Refills: 1 | Status: SHIPPED | OUTPATIENT
Start: 2024-07-02

## 2024-07-02 RX ORDER — METOPROLOL SUCCINATE 50 MG/1
50 TABLET, EXTENDED RELEASE ORAL DAILY
Qty: 90 TABLET | Refills: 1 | Status: SHIPPED | OUTPATIENT
Start: 2024-07-02

## 2024-07-02 RX ORDER — CLOPIDOGREL BISULFATE 75 MG/1
75 TABLET ORAL DAILY
Qty: 90 TABLET | Refills: 1 | Status: SHIPPED | OUTPATIENT
Start: 2024-07-02

## 2024-07-02 RX ORDER — SIMVASTATIN 20 MG
20 TABLET ORAL DAILY
Qty: 90 TABLET | Refills: 1 | Status: SHIPPED | OUTPATIENT
Start: 2024-07-02

## 2024-07-02 RX ORDER — TRAMADOL HYDROCHLORIDE 50 MG/1
50 TABLET ORAL EVERY 8 HOURS PRN
Qty: 270 TABLET | Refills: 0 | Status: SHIPPED | OUTPATIENT
Start: 2024-07-02 | End: 2024-09-30

## 2024-07-02 RX ORDER — GABAPENTIN 300 MG/1
CAPSULE ORAL
Qty: 90 CAPSULE | Refills: 0 | Status: SHIPPED | OUTPATIENT
Start: 2024-07-02 | End: 2024-08-01

## 2024-07-02 RX ORDER — AMLODIPINE BESYLATE 5 MG/1
5 TABLET ORAL DAILY
Qty: 90 TABLET | Refills: 1 | Status: SHIPPED | OUTPATIENT
Start: 2024-07-02

## 2024-07-12 ENCOUNTER — HOSPITAL ENCOUNTER (OUTPATIENT)
Dept: VASCULAR SURGERY | Facility: HOSPITAL | Age: 79
Discharge: HOME | End: 2024-07-12
Attending: FAMILY MEDICINE
Payer: MEDICARE

## 2024-07-12 DIAGNOSIS — M79.605 PAIN IN BOTH LOWER EXTREMITIES: ICD-10-CM

## 2024-07-12 DIAGNOSIS — M79.604 PAIN IN BOTH LOWER EXTREMITIES: ICD-10-CM

## 2024-07-12 PROCEDURE — 93922 UPR/L XTREMITY ART 2 LEVELS: CPT

## 2024-07-13 LAB
VAS LEFT ABI: 1.04
VAS LEFT ARM BP: 157 MMHG
VAS LEFT DORSALIS PEDIS BP: 163 MMHG
VAS LEFT PTA BP: 163 MMHG
VAS LEFT TBI: 0.94
VAS LEFT TOE PRESSURE: 147 MMHG
VAS RIGHT ABI: 1.13
VAS RIGHT ARM BP: 157 MMHG
VAS RIGHT DORSALIS PEDIS BP: 177 MMHG
VAS RIGHT PTA BP: 157 MMHG
VAS RIGHT TBI: 0.84
VAS RIGHT TOE PRESSURE: 132 MMHG

## 2024-10-15 ENCOUNTER — OFFICE VISIT (OUTPATIENT)
Facility: CLINIC | Age: 79
End: 2024-10-15
Payer: MEDICARE

## 2024-10-15 VITALS
DIASTOLIC BLOOD PRESSURE: 56 MMHG | SYSTOLIC BLOOD PRESSURE: 166 MMHG | WEIGHT: 139 LBS | HEIGHT: 65 IN | HEART RATE: 58 BPM | TEMPERATURE: 98.2 F | OXYGEN SATURATION: 98 % | RESPIRATION RATE: 18 BRPM | BODY MASS INDEX: 23.16 KG/M2

## 2024-10-15 DIAGNOSIS — K21.9 GASTROESOPHAGEAL REFLUX DISEASE WITHOUT ESOPHAGITIS: ICD-10-CM

## 2024-10-15 DIAGNOSIS — Z79.4 TYPE 2 DIABETES MELLITUS WITH STAGE 2 CHRONIC KIDNEY DISEASE, WITH LONG-TERM CURRENT USE OF INSULIN (HCC): ICD-10-CM

## 2024-10-15 DIAGNOSIS — N18.2 TYPE 2 DIABETES MELLITUS WITH STAGE 2 CHRONIC KIDNEY DISEASE, WITH LONG-TERM CURRENT USE OF INSULIN (HCC): ICD-10-CM

## 2024-10-15 DIAGNOSIS — N18.32 CHRONIC KIDNEY DISEASE, STAGE 3B (HCC): ICD-10-CM

## 2024-10-15 DIAGNOSIS — Z86.73 HX OF TRANSIENT ISCHEMIC ATTACK (TIA): ICD-10-CM

## 2024-10-15 DIAGNOSIS — M06.00 SERONEGATIVE RHEUMATOID ARTHRITIS (HCC): ICD-10-CM

## 2024-10-15 DIAGNOSIS — E11.22 TYPE 2 DIABETES MELLITUS WITH STAGE 2 CHRONIC KIDNEY DISEASE, WITH LONG-TERM CURRENT USE OF INSULIN (HCC): ICD-10-CM

## 2024-10-15 DIAGNOSIS — M81.0 OSTEOPOROSIS WITHOUT CURRENT PATHOLOGICAL FRACTURE, UNSPECIFIED OSTEOPOROSIS TYPE: ICD-10-CM

## 2024-10-15 DIAGNOSIS — I10 WHITE COAT SYNDROME WITH DIAGNOSIS OF HYPERTENSION: ICD-10-CM

## 2024-10-15 DIAGNOSIS — E11.40 TYPE 2 DIABETES MELLITUS WITH DIABETIC NEUROPATHY, WITH LONG-TERM CURRENT USE OF INSULIN (HCC): ICD-10-CM

## 2024-10-15 DIAGNOSIS — Z23 ENCOUNTER FOR IMMUNIZATION: ICD-10-CM

## 2024-10-15 DIAGNOSIS — I10 ESSENTIAL HYPERTENSION: Primary | ICD-10-CM

## 2024-10-15 DIAGNOSIS — G89.29 OTHER CHRONIC PAIN: ICD-10-CM

## 2024-10-15 DIAGNOSIS — Z79.4 TYPE 2 DIABETES MELLITUS WITH DIABETIC NEUROPATHY, WITH LONG-TERM CURRENT USE OF INSULIN (HCC): ICD-10-CM

## 2024-10-15 PROCEDURE — 90653 IIV ADJUVANT VACCINE IM: CPT | Performed by: FAMILY MEDICINE

## 2024-10-15 PROCEDURE — 3044F HG A1C LEVEL LT 7.0%: CPT | Performed by: FAMILY MEDICINE

## 2024-10-15 PROCEDURE — 1123F ACP DISCUSS/DSCN MKR DOCD: CPT | Performed by: FAMILY MEDICINE

## 2024-10-15 PROCEDURE — G0008 ADMIN INFLUENZA VIRUS VAC: HCPCS | Performed by: FAMILY MEDICINE

## 2024-10-15 PROCEDURE — 3078F DIAST BP <80 MM HG: CPT | Performed by: FAMILY MEDICINE

## 2024-10-15 PROCEDURE — 3077F SYST BP >= 140 MM HG: CPT | Performed by: FAMILY MEDICINE

## 2024-10-15 PROCEDURE — 99214 OFFICE O/P EST MOD 30 MIN: CPT | Performed by: FAMILY MEDICINE

## 2024-10-15 ASSESSMENT — ENCOUNTER SYMPTOMS
CHEST TIGHTNESS: 0
APNEA: 0
ABDOMINAL PAIN: 0
ABDOMINAL DISTENTION: 0

## 2024-10-15 NOTE — PROGRESS NOTES
[facility]    History of Present Illness:   Mariely Carty is a 79 y.o. female with history of  HTN, TIA, DM, Chronic Pain, Rheumatoid Arthritis   CC:  Follow up   History provided by patient and Records    HPI:  Hypertension Follow up:  The patient reports:  taking medications as instructed, no medication side effects noted, no TIA's, no chest pain on exertion, no dyspnea on exertion, no swelling of ankles, no orthostatic dizziness or lightheadedness, no orthopnea or paroxysmal nocturnal dyspnea.     BP Readings from Last 3 Encounters:   10/15/24 (!) 166/56   06/24/24 (!) 167/51   02/26/24 (!) 180/62      Rheumatoid Arthritis: See's Dr. Pisano for management, Taking Xeljanz and is helping significantly. Would like to get her set up for a Nephrologist evaluation.     Chronic Pain: Taking Gabapentin and Tramadol.  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 Gabapentin has helped the fingers significantly.  Noting persistent leg pains bilaterally with any walking/stairs.  Has not done as much exercise. Patient has tried PT/OT with minimal results and failed OTC  medications. Patient started a patch called [employer]     GERD: Stable     Diabetes Follow up: Overall the patient feels well with good energy level.     Current Medications: insulin aspart - 100 UNIT/ML  insulin detemir - 100 UNIT/ML  metFORMIN - 1000 MG.   Insulin dependence: Yes              Frequency of home glucose testing: Three times daily              Blood Sugar range at home: Morning 100 range, and in the evening in 150's                    Last eye exam: In past 12 months.              Last foot exam: This year.              Polyuria, polyphagia or polydipsia: No              Retinopathy: No              Neuropathy SX: No              Low blood sugar symptoms: No              Dietary compliance: compliant most of the time              Medication compliance: compliant most of the time              On ASA: Yes

## 2024-10-16 LAB
ALBUMIN SERPL-MCNC: 3.5 G/DL (ref 3.5–5)
ALBUMIN/GLOB SERPL: 1.1 (ref 1.1–2.2)
ALP SERPL-CCNC: 108 U/L (ref 45–117)
ALT SERPL-CCNC: 14 U/L (ref 12–78)
ANION GAP SERPL CALC-SCNC: 3 MMOL/L (ref 2–12)
AST SERPL-CCNC: 16 U/L (ref 15–37)
BILIRUB SERPL-MCNC: 0.1 MG/DL (ref 0.2–1)
BUN SERPL-MCNC: 18 MG/DL (ref 6–20)
BUN/CREAT SERPL: 9 (ref 12–20)
CALCIUM SERPL-MCNC: 9.4 MG/DL (ref 8.5–10.1)
CHLORIDE SERPL-SCNC: 108 MMOL/L (ref 97–108)
CHOLEST SERPL-MCNC: 209 MG/DL
CO2 SERPL-SCNC: 28 MMOL/L (ref 21–32)
CREAT SERPL-MCNC: 1.91 MG/DL (ref 0.55–1.02)
ERYTHROCYTE [DISTWIDTH] IN BLOOD BY AUTOMATED COUNT: 15.2 % (ref 11.5–14.5)
EST. AVERAGE GLUCOSE BLD GHB EST-MCNC: 154 MG/DL
GLOBULIN SER CALC-MCNC: 3.3 G/DL (ref 2–4)
GLUCOSE SERPL-MCNC: 117 MG/DL (ref 65–100)
HBA1C MFR BLD: 7 % (ref 4–5.6)
HCT VFR BLD AUTO: 32 % (ref 35–47)
HDLC SERPL-MCNC: 59 MG/DL
HDLC SERPL: 3.5 (ref 0–5)
HGB BLD-MCNC: 9.5 G/DL (ref 11.5–16)
LDLC SERPL CALC-MCNC: 113 MG/DL (ref 0–100)
MCH RBC QN AUTO: 26.4 PG (ref 26–34)
MCHC RBC AUTO-ENTMCNC: 29.7 G/DL (ref 30–36.5)
MCV RBC AUTO: 88.9 FL (ref 80–99)
NRBC # BLD: 0 K/UL (ref 0–0.01)
NRBC BLD-RTO: 0 PER 100 WBC
PLATELET # BLD AUTO: 386 K/UL (ref 150–400)
PMV BLD AUTO: 10.9 FL (ref 8.9–12.9)
POTASSIUM SERPL-SCNC: 5.2 MMOL/L (ref 3.5–5.1)
PROT SERPL-MCNC: 6.8 G/DL (ref 6.4–8.2)
RBC # BLD AUTO: 3.6 M/UL (ref 3.8–5.2)
SODIUM SERPL-SCNC: 139 MMOL/L (ref 136–145)
TRIGL SERPL-MCNC: 185 MG/DL
VLDLC SERPL CALC-MCNC: 37 MG/DL
WBC # BLD AUTO: 7.4 K/UL (ref 3.6–11)

## 2024-11-11 DIAGNOSIS — K21.9 GASTROESOPHAGEAL REFLUX DISEASE WITHOUT ESOPHAGITIS: ICD-10-CM

## 2024-11-11 DIAGNOSIS — M79.2 NEURALGIA AND NEURITIS, UNSPECIFIED: ICD-10-CM

## 2024-11-11 DIAGNOSIS — M06.00 SERONEGATIVE RHEUMATOID ARTHRITIS (HCC): ICD-10-CM

## 2024-11-11 DIAGNOSIS — G89.4 CHRONIC PAIN SYNDROME: ICD-10-CM

## 2024-11-11 DIAGNOSIS — M81.6 LOCALIZED OSTEOPOROSIS, UNSPECIFIED PATHOLOGICAL FRACTURE PRESENCE: ICD-10-CM

## 2024-11-11 RX ORDER — TRAMADOL HYDROCHLORIDE 50 MG/1
50 TABLET ORAL EVERY 8 HOURS PRN
Qty: 270 TABLET | Refills: 0 | Status: SHIPPED | OUTPATIENT
Start: 2024-11-11 | End: 2025-02-09

## 2024-11-11 RX ORDER — GABAPENTIN 300 MG/1
CAPSULE ORAL
Qty: 90 CAPSULE | Refills: 0 | Status: SHIPPED | OUTPATIENT
Start: 2024-11-11 | End: 2024-12-11

## 2024-11-11 RX ORDER — PANTOPRAZOLE SODIUM 40 MG/1
TABLET, DELAYED RELEASE ORAL
Qty: 90 TABLET | Refills: 1 | Status: SHIPPED | OUTPATIENT
Start: 2024-11-11

## 2024-11-11 RX ORDER — FAMOTIDINE 20 MG/1
40 TABLET, FILM COATED ORAL DAILY
Qty: 180 TABLET | Refills: 1 | Status: SHIPPED | OUTPATIENT
Start: 2024-11-11

## 2025-01-26 DIAGNOSIS — I10 ESSENTIAL HYPERTENSION: ICD-10-CM

## 2025-01-26 DIAGNOSIS — G89.4 CHRONIC PAIN SYNDROME: ICD-10-CM

## 2025-01-26 DIAGNOSIS — M06.00 SERONEGATIVE RHEUMATOID ARTHRITIS (HCC): ICD-10-CM

## 2025-01-26 DIAGNOSIS — M79.2 NEURALGIA AND NEURITIS, UNSPECIFIED: ICD-10-CM

## 2025-01-26 DIAGNOSIS — Z79.4 TYPE 2 DIABETES MELLITUS WITH STAGE 2 CHRONIC KIDNEY DISEASE, WITH LONG-TERM CURRENT USE OF INSULIN (HCC): ICD-10-CM

## 2025-01-26 DIAGNOSIS — E11.22 TYPE 2 DIABETES MELLITUS WITH STAGE 2 CHRONIC KIDNEY DISEASE, WITH LONG-TERM CURRENT USE OF INSULIN (HCC): ICD-10-CM

## 2025-01-26 DIAGNOSIS — M81.6 LOCALIZED OSTEOPOROSIS, UNSPECIFIED PATHOLOGICAL FRACTURE PRESENCE: ICD-10-CM

## 2025-01-26 DIAGNOSIS — Z86.73 HX OF TRANSIENT ISCHEMIC ATTACK (TIA): ICD-10-CM

## 2025-01-26 DIAGNOSIS — N18.2 TYPE 2 DIABETES MELLITUS WITH STAGE 2 CHRONIC KIDNEY DISEASE, WITH LONG-TERM CURRENT USE OF INSULIN (HCC): ICD-10-CM

## 2025-01-27 RX ORDER — MECLIZINE HCL 12.5 MG 12.5 MG/1
12.5 TABLET ORAL NIGHTLY
Qty: 90 TABLET | Refills: 1 | Status: SHIPPED | OUTPATIENT
Start: 2025-01-27

## 2025-01-27 RX ORDER — AMLODIPINE BESYLATE 5 MG/1
5 TABLET ORAL DAILY
Qty: 90 TABLET | Refills: 1 | Status: SHIPPED | OUTPATIENT
Start: 2025-01-27

## 2025-01-27 RX ORDER — CLOPIDOGREL BISULFATE 75 MG/1
75 TABLET ORAL DAILY
Qty: 90 TABLET | Refills: 1 | Status: SHIPPED | OUTPATIENT
Start: 2025-01-27

## 2025-01-27 RX ORDER — GABAPENTIN 300 MG/1
300 CAPSULE ORAL
Qty: 90 CAPSULE | Refills: 0 | Status: SHIPPED | OUTPATIENT
Start: 2025-01-27 | End: 2025-04-27

## 2025-01-27 RX ORDER — METOPROLOL SUCCINATE 50 MG/1
50 TABLET, EXTENDED RELEASE ORAL DAILY
Qty: 90 TABLET | Refills: 1 | Status: SHIPPED | OUTPATIENT
Start: 2025-01-27

## 2025-01-27 RX ORDER — TRAMADOL HYDROCHLORIDE 50 MG/1
50 TABLET ORAL EVERY 8 HOURS PRN
Qty: 270 TABLET | Refills: 0 | Status: SHIPPED | OUTPATIENT
Start: 2025-01-27 | End: 2025-04-27

## 2025-01-27 RX ORDER — SIMVASTATIN 20 MG
20 TABLET ORAL DAILY
Qty: 90 TABLET | Refills: 1 | Status: SHIPPED | OUTPATIENT
Start: 2025-01-27

## 2025-02-02 SDOH — ECONOMIC STABILITY: FOOD INSECURITY: WITHIN THE PAST 12 MONTHS, YOU WORRIED THAT YOUR FOOD WOULD RUN OUT BEFORE YOU GOT MONEY TO BUY MORE.: NEVER TRUE

## 2025-02-02 SDOH — ECONOMIC STABILITY: INCOME INSECURITY: IN THE LAST 12 MONTHS, WAS THERE A TIME WHEN YOU WERE NOT ABLE TO PAY THE MORTGAGE OR RENT ON TIME?: NO

## 2025-02-02 SDOH — ECONOMIC STABILITY: FOOD INSECURITY: WITHIN THE PAST 12 MONTHS, THE FOOD YOU BOUGHT JUST DIDN'T LAST AND YOU DIDN'T HAVE MONEY TO GET MORE.: NEVER TRUE

## 2025-02-02 SDOH — ECONOMIC STABILITY: TRANSPORTATION INSECURITY
IN THE PAST 12 MONTHS, HAS THE LACK OF TRANSPORTATION KEPT YOU FROM MEDICAL APPOINTMENTS OR FROM GETTING MEDICATIONS?: NO

## 2025-02-05 ENCOUNTER — OFFICE VISIT (OUTPATIENT)
Facility: CLINIC | Age: 80
End: 2025-02-05
Payer: MEDICARE

## 2025-02-05 VITALS
RESPIRATION RATE: 16 BRPM | BODY MASS INDEX: 24.66 KG/M2 | DIASTOLIC BLOOD PRESSURE: 69 MMHG | TEMPERATURE: 98 F | HEART RATE: 89 BPM | HEIGHT: 65 IN | SYSTOLIC BLOOD PRESSURE: 143 MMHG | WEIGHT: 148 LBS | OXYGEN SATURATION: 97 %

## 2025-02-05 DIAGNOSIS — G89.29 CHRONIC BILATERAL LOW BACK PAIN, UNSPECIFIED WHETHER SCIATICA PRESENT: ICD-10-CM

## 2025-02-05 DIAGNOSIS — Z79.4 TYPE 2 DIABETES MELLITUS WITH DIABETIC NEUROPATHY, WITH LONG-TERM CURRENT USE OF INSULIN (HCC): ICD-10-CM

## 2025-02-05 DIAGNOSIS — I10 WHITE COAT SYNDROME WITH DIAGNOSIS OF HYPERTENSION: Primary | ICD-10-CM

## 2025-02-05 DIAGNOSIS — M06.00 SERONEGATIVE RHEUMATOID ARTHRITIS (HCC): ICD-10-CM

## 2025-02-05 DIAGNOSIS — Z79.4 TYPE 2 DIABETES MELLITUS WITH STAGE 2 CHRONIC KIDNEY DISEASE, WITH LONG-TERM CURRENT USE OF INSULIN (HCC): ICD-10-CM

## 2025-02-05 DIAGNOSIS — K21.9 GASTROESOPHAGEAL REFLUX DISEASE WITHOUT ESOPHAGITIS: ICD-10-CM

## 2025-02-05 DIAGNOSIS — M81.0 OSTEOPOROSIS WITHOUT CURRENT PATHOLOGICAL FRACTURE, UNSPECIFIED OSTEOPOROSIS TYPE: ICD-10-CM

## 2025-02-05 DIAGNOSIS — G89.29 OTHER CHRONIC PAIN: ICD-10-CM

## 2025-02-05 DIAGNOSIS — N18.32 CHRONIC KIDNEY DISEASE, STAGE 3B (HCC): ICD-10-CM

## 2025-02-05 DIAGNOSIS — E11.22 TYPE 2 DIABETES MELLITUS WITH STAGE 2 CHRONIC KIDNEY DISEASE, WITH LONG-TERM CURRENT USE OF INSULIN (HCC): ICD-10-CM

## 2025-02-05 DIAGNOSIS — E11.40 TYPE 2 DIABETES MELLITUS WITH DIABETIC NEUROPATHY, WITH LONG-TERM CURRENT USE OF INSULIN (HCC): ICD-10-CM

## 2025-02-05 DIAGNOSIS — M54.50 CHRONIC BILATERAL LOW BACK PAIN, UNSPECIFIED WHETHER SCIATICA PRESENT: ICD-10-CM

## 2025-02-05 DIAGNOSIS — N18.2 TYPE 2 DIABETES MELLITUS WITH STAGE 2 CHRONIC KIDNEY DISEASE, WITH LONG-TERM CURRENT USE OF INSULIN (HCC): ICD-10-CM

## 2025-02-05 PROCEDURE — 3077F SYST BP >= 140 MM HG: CPT | Performed by: FAMILY MEDICINE

## 2025-02-05 PROCEDURE — 1159F MED LIST DOCD IN RCRD: CPT | Performed by: FAMILY MEDICINE

## 2025-02-05 PROCEDURE — 1160F RVW MEDS BY RX/DR IN RCRD: CPT | Performed by: FAMILY MEDICINE

## 2025-02-05 PROCEDURE — 1123F ACP DISCUSS/DSCN MKR DOCD: CPT | Performed by: FAMILY MEDICINE

## 2025-02-05 PROCEDURE — 3078F DIAST BP <80 MM HG: CPT | Performed by: FAMILY MEDICINE

## 2025-02-05 PROCEDURE — 1126F AMNT PAIN NOTED NONE PRSNT: CPT | Performed by: FAMILY MEDICINE

## 2025-02-05 RX ORDER — LIDOCAINE 50 MG/G
1 PATCH TOPICAL DAILY
Qty: 90 PATCH | Refills: 1 | Status: SHIPPED | OUTPATIENT
Start: 2025-02-05

## 2025-02-05 ASSESSMENT — ENCOUNTER SYMPTOMS
ABDOMINAL PAIN: 0
CHEST TIGHTNESS: 0
APNEA: 0
BACK PAIN: 1
ABDOMINAL DISTENTION: 0

## 2025-02-05 ASSESSMENT — PATIENT HEALTH QUESTIONNAIRE - PHQ9
SUM OF ALL RESPONSES TO PHQ9 QUESTIONS 1 & 2: 0
SUM OF ALL RESPONSES TO PHQ QUESTIONS 1-9: 0
2. FEELING DOWN, DEPRESSED OR HOPELESS: NOT AT ALL
SUM OF ALL RESPONSES TO PHQ QUESTIONS 1-9: 0
SUM OF ALL RESPONSES TO PHQ QUESTIONS 1-9: 0
1. LITTLE INTEREST OR PLEASURE IN DOING THINGS: NOT AT ALL
SUM OF ALL RESPONSES TO PHQ QUESTIONS 1-9: 0

## 2025-02-05 NOTE — PROGRESS NOTES
[facility]    History of Present Illness:   Mariely Carty is a 79 y.o. female with history of HTN, TIA, DM, Chronic Pain, Rheumatoid Arthritis   CC: Follow up   History provided by patient and Records    HPI:  Hypertension Follow up:  The patient reports:  taking medications as instructed, no medication side effects noted, no TIA's, no chest pain on exertion, no dyspnea on exertion, no swelling of ankles, no orthostatic dizziness or lightheadedness, no orthopnea or paroxysmal nocturnal dyspnea.     BP Readings from Last 3 Encounters:   02/05/25 (!) 163/59   10/15/24 (!) 166/56   06/24/24 (!) 167/51       Chronic Pain: Taking Gabapentin and Tramadol.  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 Gabapentin has helped the fingers significantly.  Noting persistent leg pains bilaterally with any walking/stairs.  Patient has tried PT/OT with minimal results and failed OTC  medications. Minimal current exercise.  Wears Lidocaine patch on low back.    GERD: Stable     Diabetes Follow up: Overall the patient feels well with good energy level.     Current Medications: insulin aspart - 100 UNIT/ML  insulin detemir - 100 UNIT/ML  metFORMIN - 1000 MG.   Insulin dependence: Yes              Frequency of home glucose testing: Three times daily              Blood Sugar range at home: Morning 100 range, and in the evening in 150's                    Last eye exam: In past 12 months.              Last foot exam: This year.              Polyuria, polyphagia or polydipsia: No              Retinopathy: No              Neuropathy SX: No              Low blood sugar symptoms: No              Dietary compliance: compliant most of the time              Medication compliance: compliant most of the time              On ASA: Yes              Tobacco Use: No              Depression: No     Wt Readings from Last 3 Encounters:   02/05/25 67.1 kg (148 lb)   10/15/24 63 kg (139 lb)   06/24/24 65.8 kg (145 lb)

## 2025-02-05 NOTE — PATIENT INSTRUCTIONS
[address]  [address] [phone]    Monitor blood pressure outside the office several times weekly at different times during the day and evening.   Blood Pressure Record     Patient Name:  _____Mariely Carty_________________ :  ________[id number]________    Date/Time BP Reading Pulse

## 2025-02-06 LAB
ALBUMIN SERPL-MCNC: 3.3 G/DL (ref 3.5–5)
ALBUMIN/GLOB SERPL: 0.9 (ref 1.1–2.2)
ALP SERPL-CCNC: 97 U/L (ref 45–117)
ALT SERPL-CCNC: 14 U/L (ref 12–78)
ANION GAP SERPL CALC-SCNC: 9 MMOL/L (ref 2–12)
AST SERPL-CCNC: 13 U/L (ref 15–37)
BILIRUB SERPL-MCNC: 0.3 MG/DL (ref 0.2–1)
BUN SERPL-MCNC: 22 MG/DL (ref 6–20)
BUN/CREAT SERPL: 11 (ref 12–20)
CALCIUM SERPL-MCNC: 10 MG/DL (ref 8.5–10.1)
CHLORIDE SERPL-SCNC: 104 MMOL/L (ref 97–108)
CHOLEST SERPL-MCNC: 241 MG/DL
CO2 SERPL-SCNC: 25 MMOL/L (ref 21–32)
CREAT SERPL-MCNC: 2.05 MG/DL (ref 0.55–1.02)
CREAT UR-MCNC: 243 MG/DL
ERYTHROCYTE [DISTWIDTH] IN BLOOD BY AUTOMATED COUNT: 17.4 % (ref 11.5–14.5)
EST. AVERAGE GLUCOSE BLD GHB EST-MCNC: 171 MG/DL
GLOBULIN SER CALC-MCNC: 3.7 G/DL (ref 2–4)
GLUCOSE SERPL-MCNC: 120 MG/DL (ref 65–100)
HBA1C MFR BLD: 7.6 % (ref 4–5.6)
HCT VFR BLD AUTO: 28.6 % (ref 35–47)
HDLC SERPL-MCNC: 58 MG/DL
HDLC SERPL: 4.2 (ref 0–5)
HGB BLD-MCNC: 8.7 G/DL (ref 11.5–16)
LDLC SERPL CALC-MCNC: 143.8 MG/DL (ref 0–100)
MCH RBC QN AUTO: 26.9 PG (ref 26–34)
MCHC RBC AUTO-ENTMCNC: 30.4 G/DL (ref 30–36.5)
MCV RBC AUTO: 88.5 FL (ref 80–99)
MICROALBUMIN UR-MCNC: 154 MG/DL
MICROALBUMIN/CREAT UR-RTO: 634 MG/G (ref 0–30)
NRBC # BLD: 0 K/UL (ref 0–0.01)
NRBC BLD-RTO: 0 PER 100 WBC
PLATELET # BLD AUTO: 347 K/UL (ref 150–400)
PMV BLD AUTO: 11.2 FL (ref 8.9–12.9)
POTASSIUM SERPL-SCNC: 5.1 MMOL/L (ref 3.5–5.1)
PROT SERPL-MCNC: 7 G/DL (ref 6.4–8.2)
RBC # BLD AUTO: 3.23 M/UL (ref 3.8–5.2)
SODIUM SERPL-SCNC: 138 MMOL/L (ref 136–145)
TRIGL SERPL-MCNC: 196 MG/DL
VLDLC SERPL CALC-MCNC: 39.2 MG/DL
WBC # BLD AUTO: 5.8 K/UL (ref 3.6–11)

## 2025-04-08 DIAGNOSIS — G89.4 CHRONIC PAIN SYNDROME: ICD-10-CM

## 2025-04-08 DIAGNOSIS — K21.9 GASTROESOPHAGEAL REFLUX DISEASE WITHOUT ESOPHAGITIS: ICD-10-CM

## 2025-04-08 DIAGNOSIS — M06.00 SERONEGATIVE RHEUMATOID ARTHRITIS (HCC): ICD-10-CM

## 2025-04-08 DIAGNOSIS — M79.2 NEURALGIA AND NEURITIS, UNSPECIFIED: ICD-10-CM

## 2025-04-08 DIAGNOSIS — M81.6 LOCALIZED OSTEOPOROSIS, UNSPECIFIED PATHOLOGICAL FRACTURE PRESENCE: ICD-10-CM

## 2025-04-08 RX ORDER — TRAMADOL HYDROCHLORIDE 50 MG/1
50 TABLET ORAL EVERY 8 HOURS PRN
Qty: 270 TABLET | Refills: 0 | Status: SHIPPED | OUTPATIENT
Start: 2025-04-08 | End: 2025-07-07

## 2025-04-08 RX ORDER — PANTOPRAZOLE SODIUM 40 MG/1
TABLET, DELAYED RELEASE ORAL
Qty: 90 TABLET | Refills: 1 | Status: SHIPPED | OUTPATIENT
Start: 2025-04-08

## 2025-04-08 RX ORDER — FAMOTIDINE 20 MG/1
40 TABLET, FILM COATED ORAL DAILY
Qty: 180 TABLET | Refills: 1 | Status: SHIPPED | OUTPATIENT
Start: 2025-04-08

## 2025-04-08 RX ORDER — GABAPENTIN 300 MG/1
CAPSULE ORAL
Qty: 90 CAPSULE | Refills: 0 | Status: SHIPPED | OUTPATIENT
Start: 2025-04-08 | End: 2025-07-07

## 2025-05-05 ENCOUNTER — OFFICE VISIT (OUTPATIENT)
Facility: CLINIC | Age: 80
End: 2025-05-05
Payer: MEDICARE

## 2025-05-05 VITALS
HEIGHT: 65 IN | TEMPERATURE: 96.8 F | RESPIRATION RATE: 18 BRPM | SYSTOLIC BLOOD PRESSURE: 182 MMHG | WEIGHT: 151.8 LBS | BODY MASS INDEX: 25.29 KG/M2 | HEART RATE: 60 BPM | DIASTOLIC BLOOD PRESSURE: 62 MMHG | OXYGEN SATURATION: 100 %

## 2025-05-05 DIAGNOSIS — M06.00 SERONEGATIVE RHEUMATOID ARTHRITIS (HCC): ICD-10-CM

## 2025-05-05 DIAGNOSIS — Z86.73 HX OF TRANSIENT ISCHEMIC ATTACK (TIA): ICD-10-CM

## 2025-05-05 DIAGNOSIS — N18.32 CHRONIC KIDNEY DISEASE, STAGE 3B (HCC): ICD-10-CM

## 2025-05-05 DIAGNOSIS — K21.9 GASTROESOPHAGEAL REFLUX DISEASE WITHOUT ESOPHAGITIS: ICD-10-CM

## 2025-05-05 DIAGNOSIS — Z79.4 TYPE 2 DIABETES MELLITUS WITH STAGE 2 CHRONIC KIDNEY DISEASE, WITH LONG-TERM CURRENT USE OF INSULIN (HCC): ICD-10-CM

## 2025-05-05 DIAGNOSIS — E11.22 TYPE 2 DIABETES MELLITUS WITH STAGE 2 CHRONIC KIDNEY DISEASE, WITH LONG-TERM CURRENT USE OF INSULIN (HCC): ICD-10-CM

## 2025-05-05 DIAGNOSIS — Z02.89 PAIN MANAGEMENT CONTRACT AGREEMENT: ICD-10-CM

## 2025-05-05 DIAGNOSIS — I10 ESSENTIAL HYPERTENSION: Primary | ICD-10-CM

## 2025-05-05 DIAGNOSIS — G89.29 OTHER CHRONIC PAIN: ICD-10-CM

## 2025-05-05 DIAGNOSIS — N18.2 TYPE 2 DIABETES MELLITUS WITH STAGE 2 CHRONIC KIDNEY DISEASE, WITH LONG-TERM CURRENT USE OF INSULIN (HCC): ICD-10-CM

## 2025-05-05 PROCEDURE — 99214 OFFICE O/P EST MOD 30 MIN: CPT | Performed by: FAMILY MEDICINE

## 2025-05-05 PROCEDURE — 1160F RVW MEDS BY RX/DR IN RCRD: CPT | Performed by: FAMILY MEDICINE

## 2025-05-05 PROCEDURE — 1159F MED LIST DOCD IN RCRD: CPT | Performed by: FAMILY MEDICINE

## 2025-05-05 PROCEDURE — 1123F ACP DISCUSS/DSCN MKR DOCD: CPT | Performed by: FAMILY MEDICINE

## 2025-05-05 PROCEDURE — 3078F DIAST BP <80 MM HG: CPT | Performed by: FAMILY MEDICINE

## 2025-05-05 PROCEDURE — 3051F HG A1C>EQUAL 7.0%<8.0%: CPT | Performed by: FAMILY MEDICINE

## 2025-05-05 PROCEDURE — G2211 COMPLEX E/M VISIT ADD ON: HCPCS | Performed by: FAMILY MEDICINE

## 2025-05-05 PROCEDURE — 3077F SYST BP >= 140 MM HG: CPT | Performed by: FAMILY MEDICINE

## 2025-05-05 RX ORDER — TRAMADOL HYDROCHLORIDE 50 MG/1
100 TABLET ORAL 2 TIMES DAILY PRN
Qty: 360 TABLET | Refills: 0 | Status: SHIPPED | OUTPATIENT
Start: 2025-05-05 | End: 2025-08-03

## 2025-05-05 ASSESSMENT — PATIENT HEALTH QUESTIONNAIRE - PHQ9
SUM OF ALL RESPONSES TO PHQ QUESTIONS 1-9: 0
1. LITTLE INTEREST OR PLEASURE IN DOING THINGS: NOT AT ALL
SUM OF ALL RESPONSES TO PHQ QUESTIONS 1-9: 0
2. FEELING DOWN, DEPRESSED OR HOPELESS: NOT AT ALL
SUM OF ALL RESPONSES TO PHQ QUESTIONS 1-9: 0
SUM OF ALL RESPONSES TO PHQ QUESTIONS 1-9: 0

## 2025-05-05 ASSESSMENT — ENCOUNTER SYMPTOMS
ABDOMINAL PAIN: 0
APNEA: 0
ABDOMINAL DISTENTION: 0
CHEST TIGHTNESS: 0

## 2025-05-05 NOTE — PROGRESS NOTES
[facility]    History of Present Illness:   Mariely Carty is a 79 y.o. female with history of HTN, TIA, DM, Chronic Pain, Rheumatoid Arthritis   CC: Follow up   History provided by patient and Records    HPI:  Hypertension Follow up:  The patient reports:  taking medications as instructed, no medication side effects noted, no TIA's, no chest pain on exertion, no dyspnea on exertion, no swelling of ankles, no orthostatic dizziness or lightheadedness, no orthopnea or paroxysmal nocturnal dyspnea.     BP Readings from Last 3 Encounters:   05/05/25 (!) 194/64   02/05/25 (!) 143/69   10/15/24 (!) 166/56      Chronic Pain: Taking Gabapentin and Tramadol.  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 Gabapentin has helped the fingers significantly but notes that the pain has been exacerbated recently (Last 1-2 months at least).  Noting persistent leg pains bilaterally with any walking/stairs.  Patient has tried PT/OT with minimal results and failed OTC  medications. Minimal current exercise.  Wears Lidocaine patch on low back.     Osteoarthritis: Patient noting the arthritis in the fingers is worsening at this time and noting increased pain and the Tramadol is not working as well.  Apparently may have had extra tramadol due to mail order and was taking tramadol 2 pills in the morning a 2 in the night.  Has been only dong 2 in the morning.    GERD: Stable     Diabetes Follow up: Overall the patient feels well with good energy level.                Current Medications: insulin aspart - 100 UNIT/ML  insulin detemir - 100 UNIT/ML  metFORMIN - 1000 MG.   Insulin dependence: Yes              Frequency of home glucose testing: Three times daily              Blood Sugar range at home: Morning 100 range, and in the evening in 150's                    Last eye exam: In past 12 months.              Last foot exam: This year.              Polyuria, polyphagia or polydipsia: No              Retinopathy: No

## 2025-05-06 ENCOUNTER — RESULTS FOLLOW-UP (OUTPATIENT)
Facility: CLINIC | Age: 80
End: 2025-05-06

## 2025-05-06 LAB
ALBUMIN SERPL-MCNC: 3.4 G/DL (ref 3.5–5)
ALBUMIN/GLOB SERPL: 0.9 (ref 1.1–2.2)
ALP SERPL-CCNC: 108 U/L (ref 45–117)
ALT SERPL-CCNC: 18 U/L (ref 12–78)
ANION GAP SERPL CALC-SCNC: 2 MMOL/L (ref 2–12)
AST SERPL-CCNC: 16 U/L (ref 15–37)
BILIRUB SERPL-MCNC: 0.2 MG/DL (ref 0.2–1)
BUN SERPL-MCNC: 25 MG/DL (ref 6–20)
BUN/CREAT SERPL: 13 (ref 12–20)
CALCIUM SERPL-MCNC: 9.6 MG/DL (ref 8.5–10.1)
CHLORIDE SERPL-SCNC: 105 MMOL/L (ref 97–108)
CHOLEST SERPL-MCNC: 257 MG/DL
CO2 SERPL-SCNC: 29 MMOL/L (ref 21–32)
COMMENT:: NORMAL
CREAT SERPL-MCNC: 1.86 MG/DL (ref 0.55–1.02)
ERYTHROCYTE [DISTWIDTH] IN BLOOD BY AUTOMATED COUNT: 16.1 % (ref 11.5–14.5)
EST. AVERAGE GLUCOSE BLD GHB EST-MCNC: 166 MG/DL
GLOBULIN SER CALC-MCNC: 3.8 G/DL (ref 2–4)
GLUCOSE SERPL-MCNC: 111 MG/DL (ref 65–100)
HBA1C MFR BLD: 7.4 % (ref 4–5.6)
HCT VFR BLD AUTO: 29.6 % (ref 35–47)
HDLC SERPL-MCNC: 74 MG/DL
HDLC SERPL: 3.5 (ref 0–5)
HGB BLD-MCNC: 8.9 G/DL (ref 11.5–16)
LDLC SERPL CALC-MCNC: 152.2 MG/DL (ref 0–100)
MCH RBC QN AUTO: 26.6 PG (ref 26–34)
MCHC RBC AUTO-ENTMCNC: 30.1 G/DL (ref 30–36.5)
MCV RBC AUTO: 88.4 FL (ref 80–99)
NRBC # BLD: 0 K/UL (ref 0–0.01)
NRBC BLD-RTO: 0 PER 100 WBC
PLATELET # BLD AUTO: 342 K/UL (ref 150–400)
PMV BLD AUTO: 10.8 FL (ref 8.9–12.9)
POTASSIUM SERPL-SCNC: 5.2 MMOL/L (ref 3.5–5.1)
PROT SERPL-MCNC: 7.2 G/DL (ref 6.4–8.2)
RBC # BLD AUTO: 3.35 M/UL (ref 3.8–5.2)
SODIUM SERPL-SCNC: 136 MMOL/L (ref 136–145)
SPECIMEN HOLD: NORMAL
TRIGL SERPL-MCNC: 154 MG/DL
VLDLC SERPL CALC-MCNC: 30.8 MG/DL
WBC # BLD AUTO: 7.6 K/UL (ref 3.6–11)

## 2025-05-09 LAB — DRUGS UR: NORMAL

## 2025-06-16 DIAGNOSIS — E11.22 TYPE 2 DIABETES MELLITUS WITH STAGE 2 CHRONIC KIDNEY DISEASE, WITH LONG-TERM CURRENT USE OF INSULIN (HCC): ICD-10-CM

## 2025-06-16 DIAGNOSIS — N18.2 TYPE 2 DIABETES MELLITUS WITH STAGE 2 CHRONIC KIDNEY DISEASE, WITH LONG-TERM CURRENT USE OF INSULIN (HCC): ICD-10-CM

## 2025-06-16 DIAGNOSIS — Z79.4 TYPE 2 DIABETES MELLITUS WITH STAGE 2 CHRONIC KIDNEY DISEASE, WITH LONG-TERM CURRENT USE OF INSULIN (HCC): ICD-10-CM

## 2025-06-19 ENCOUNTER — TELEPHONE (OUTPATIENT)
Facility: CLINIC | Age: 80
End: 2025-06-19

## 2025-06-19 DIAGNOSIS — E11.40 TYPE 2 DIABETES MELLITUS WITH DIABETIC NEUROPATHY, WITH LONG-TERM CURRENT USE OF INSULIN (HCC): Primary | ICD-10-CM

## 2025-06-19 DIAGNOSIS — Z79.4 TYPE 2 DIABETES MELLITUS WITH DIABETIC NEUROPATHY, WITH LONG-TERM CURRENT USE OF INSULIN (HCC): Primary | ICD-10-CM

## 2025-06-19 RX ORDER — INSULIN GLARGINE 100 [IU]/ML
INJECTION, SOLUTION SUBCUTANEOUS
Qty: 36 ML | Refills: 1 | Status: SHIPPED | OUTPATIENT
Start: 2025-06-19

## 2025-06-19 RX ORDER — INSULIN GLARGINE 100 [IU]/ML
INJECTION, SOLUTION SUBCUTANEOUS
Qty: 36 ML | Refills: 1 | Status: SHIPPED | OUTPATIENT
Start: 2025-06-19 | End: 2025-06-19

## 2025-06-19 NOTE — TELEPHONE ENCOUNTER
Pharmacist states the  has discontinued the insulin detemir (LEVEMIR) 100 UNIT/ML injection vial Please send another Rx for the pt's insulin needs.

## 2025-08-27 DIAGNOSIS — Z79.4 TYPE 2 DIABETES MELLITUS WITH STAGE 2 CHRONIC KIDNEY DISEASE, WITH LONG-TERM CURRENT USE OF INSULIN (HCC): ICD-10-CM

## 2025-08-27 DIAGNOSIS — Z86.73 HX OF TRANSIENT ISCHEMIC ATTACK (TIA): ICD-10-CM

## 2025-08-27 DIAGNOSIS — M79.2 NEURALGIA AND NEURITIS, UNSPECIFIED: ICD-10-CM

## 2025-08-27 DIAGNOSIS — N18.2 TYPE 2 DIABETES MELLITUS WITH STAGE 2 CHRONIC KIDNEY DISEASE, WITH LONG-TERM CURRENT USE OF INSULIN (HCC): ICD-10-CM

## 2025-08-27 DIAGNOSIS — I10 ESSENTIAL HYPERTENSION: ICD-10-CM

## 2025-08-27 DIAGNOSIS — G89.4 CHRONIC PAIN SYNDROME: ICD-10-CM

## 2025-08-27 DIAGNOSIS — K21.9 GASTROESOPHAGEAL REFLUX DISEASE WITHOUT ESOPHAGITIS: ICD-10-CM

## 2025-08-27 DIAGNOSIS — E11.22 TYPE 2 DIABETES MELLITUS WITH STAGE 2 CHRONIC KIDNEY DISEASE, WITH LONG-TERM CURRENT USE OF INSULIN (HCC): ICD-10-CM

## 2025-08-27 DIAGNOSIS — G89.29 OTHER CHRONIC PAIN: ICD-10-CM

## 2025-08-27 DIAGNOSIS — M06.00 SERONEGATIVE RHEUMATOID ARTHRITIS (HCC): ICD-10-CM

## 2025-08-27 RX ORDER — FAMOTIDINE 20 MG/1
40 TABLET, FILM COATED ORAL DAILY
Qty: 180 TABLET | Refills: 1 | Status: SHIPPED | OUTPATIENT
Start: 2025-08-27

## 2025-08-27 RX ORDER — MECLIZINE HCL 12.5 MG 12.5 MG/1
12.5 TABLET ORAL NIGHTLY
Qty: 90 TABLET | Refills: 1 | Status: SHIPPED | OUTPATIENT
Start: 2025-08-27

## 2025-08-27 RX ORDER — METOPROLOL SUCCINATE 50 MG/1
50 TABLET, EXTENDED RELEASE ORAL DAILY
Qty: 90 TABLET | Refills: 1 | Status: SHIPPED | OUTPATIENT
Start: 2025-08-27

## 2025-08-27 RX ORDER — GABAPENTIN 300 MG/1
CAPSULE ORAL
Qty: 90 CAPSULE | Refills: 0 | Status: SHIPPED | OUTPATIENT
Start: 2025-08-27 | End: 2025-11-25

## 2025-08-27 RX ORDER — AMLODIPINE BESYLATE 5 MG/1
5 TABLET ORAL DAILY
Qty: 90 TABLET | Refills: 1 | Status: SHIPPED | OUTPATIENT
Start: 2025-08-27

## 2025-08-27 RX ORDER — SIMVASTATIN 20 MG
20 TABLET ORAL DAILY
Qty: 90 TABLET | Refills: 1 | Status: SHIPPED | OUTPATIENT
Start: 2025-08-27

## 2025-08-27 RX ORDER — TRAMADOL HYDROCHLORIDE 50 MG/1
100 TABLET ORAL 2 TIMES DAILY
Qty: 360 TABLET | Refills: 0 | Status: SHIPPED | OUTPATIENT
Start: 2025-08-27 | End: 2025-11-25

## 2025-08-27 RX ORDER — CLOPIDOGREL BISULFATE 75 MG/1
75 TABLET ORAL DAILY
Qty: 90 TABLET | Refills: 1 | Status: SHIPPED | OUTPATIENT
Start: 2025-08-27

## 2025-08-27 RX ORDER — PANTOPRAZOLE SODIUM 40 MG/1
TABLET, DELAYED RELEASE ORAL
Qty: 90 TABLET | Refills: 1 | Status: SHIPPED | OUTPATIENT
Start: 2025-08-27